# Patient Record
Sex: FEMALE | Race: WHITE | NOT HISPANIC OR LATINO | Employment: OTHER | URBAN - METROPOLITAN AREA
[De-identification: names, ages, dates, MRNs, and addresses within clinical notes are randomized per-mention and may not be internally consistent; named-entity substitution may affect disease eponyms.]

---

## 2017-03-11 ENCOUNTER — APPOINTMENT (OUTPATIENT)
Dept: LAB | Facility: CLINIC | Age: 57
End: 2017-03-11
Payer: COMMERCIAL

## 2017-03-11 DIAGNOSIS — Z13.0 ENCOUNTER FOR SCREENING FOR DISEASES OF THE BLOOD AND BLOOD-FORMING ORGANS AND CERTAIN DISORDERS INVOLVING THE IMMUNE MECHANISM: ICD-10-CM

## 2017-03-11 DIAGNOSIS — E03.9 HYPOTHYROIDISM: ICD-10-CM

## 2017-03-11 DIAGNOSIS — I10 ESSENTIAL (PRIMARY) HYPERTENSION: ICD-10-CM

## 2017-03-11 DIAGNOSIS — Z13.220 ENCOUNTER FOR SCREENING FOR LIPOID DISORDERS: ICD-10-CM

## 2017-03-11 LAB
ALBUMIN SERPL BCP-MCNC: 3.9 G/DL (ref 3.5–5)
ALP SERPL-CCNC: 57 U/L (ref 46–116)
ALT SERPL W P-5'-P-CCNC: 28 U/L (ref 12–78)
ANION GAP SERPL CALCULATED.3IONS-SCNC: 5 MMOL/L (ref 4–13)
AST SERPL W P-5'-P-CCNC: 19 U/L (ref 5–45)
BASOPHILS # BLD AUTO: 0.01 THOUSANDS/ΜL (ref 0–0.1)
BASOPHILS NFR BLD AUTO: 0 % (ref 0–1)
BILIRUB SERPL-MCNC: 0.59 MG/DL (ref 0.2–1)
BUN SERPL-MCNC: 8 MG/DL (ref 5–25)
CALCIUM SERPL-MCNC: 9.1 MG/DL (ref 8.3–10.1)
CHLORIDE SERPL-SCNC: 105 MMOL/L (ref 100–108)
CHOLEST SERPL-MCNC: 194 MG/DL (ref 50–200)
CO2 SERPL-SCNC: 30 MMOL/L (ref 21–32)
CREAT SERPL-MCNC: 0.7 MG/DL (ref 0.6–1.3)
EOSINOPHIL # BLD AUTO: 0.07 THOUSAND/ΜL (ref 0–0.61)
EOSINOPHIL NFR BLD AUTO: 1 % (ref 0–6)
ERYTHROCYTE [DISTWIDTH] IN BLOOD BY AUTOMATED COUNT: 13.3 % (ref 11.6–15.1)
GFR SERPL CREATININE-BSD FRML MDRD: >60 ML/MIN/1.73SQ M
GLUCOSE SERPL-MCNC: 96 MG/DL (ref 65–140)
HCT VFR BLD AUTO: 46.4 % (ref 34.8–46.1)
HDLC SERPL-MCNC: 61 MG/DL (ref 40–60)
HGB BLD-MCNC: 15.8 G/DL (ref 11.5–15.4)
LDLC SERPL CALC-MCNC: 103 MG/DL (ref 0–100)
LYMPHOCYTES # BLD AUTO: 2.44 THOUSANDS/ΜL (ref 0.6–4.47)
LYMPHOCYTES NFR BLD AUTO: 35 % (ref 14–44)
MCH RBC QN AUTO: 31.7 PG (ref 26.8–34.3)
MCHC RBC AUTO-ENTMCNC: 34.1 G/DL (ref 31.4–37.4)
MCV RBC AUTO: 93 FL (ref 82–98)
MONOCYTES # BLD AUTO: 0.61 THOUSAND/ΜL (ref 0.17–1.22)
MONOCYTES NFR BLD AUTO: 9 % (ref 4–12)
NEUTROPHILS # BLD AUTO: 3.88 THOUSANDS/ΜL (ref 1.85–7.62)
NEUTS SEG NFR BLD AUTO: 55 % (ref 43–75)
NRBC BLD AUTO-RTO: 0 /100 WBCS
PLATELET # BLD AUTO: 235 THOUSANDS/UL (ref 149–390)
PMV BLD AUTO: 10.6 FL (ref 8.9–12.7)
POTASSIUM SERPL-SCNC: 3.9 MMOL/L (ref 3.5–5.3)
PROT SERPL-MCNC: 7.8 G/DL (ref 6.4–8.2)
RBC # BLD AUTO: 4.98 MILLION/UL (ref 3.81–5.12)
SODIUM SERPL-SCNC: 140 MMOL/L (ref 136–145)
TRIGL SERPL-MCNC: 152 MG/DL
TSH SERPL DL<=0.05 MIU/L-ACNC: 6.79 UIU/ML (ref 0.36–3.74)
WBC # BLD AUTO: 7.02 THOUSAND/UL (ref 4.31–10.16)

## 2017-03-11 PROCEDURE — 85025 COMPLETE CBC W/AUTO DIFF WBC: CPT

## 2017-03-11 PROCEDURE — 84443 ASSAY THYROID STIM HORMONE: CPT

## 2017-03-11 PROCEDURE — 80053 COMPREHEN METABOLIC PANEL: CPT

## 2017-03-11 PROCEDURE — 80061 LIPID PANEL: CPT

## 2017-03-11 PROCEDURE — 36415 COLL VENOUS BLD VENIPUNCTURE: CPT

## 2017-04-24 ENCOUNTER — ALLSCRIPTS OFFICE VISIT (OUTPATIENT)
Dept: OTHER | Facility: OTHER | Age: 57
End: 2017-04-24

## 2017-04-24 ENCOUNTER — HOSPITAL ENCOUNTER (OUTPATIENT)
Dept: RADIOLOGY | Facility: CLINIC | Age: 57
Discharge: HOME | End: 2017-04-24
Payer: COMMERCIAL

## 2017-04-24 ENCOUNTER — APPOINTMENT (OUTPATIENT)
Dept: LAB | Facility: CLINIC | Age: 57
End: 2017-04-24
Payer: COMMERCIAL

## 2017-04-24 DIAGNOSIS — I72.9 ANEURYSM (HCC): ICD-10-CM

## 2017-04-24 DIAGNOSIS — R94.31 ABNORMAL ELECTROCARDIOGRAM: ICD-10-CM

## 2017-04-24 DIAGNOSIS — E03.9 HYPOTHYROIDISM: ICD-10-CM

## 2017-04-24 DIAGNOSIS — M25.512 PAIN IN LEFT SHOULDER: ICD-10-CM

## 2017-04-24 DIAGNOSIS — R07.9 CHEST PAIN: ICD-10-CM

## 2017-04-24 PROCEDURE — 73030 X-RAY EXAM OF SHOULDER: CPT

## 2017-04-24 PROCEDURE — 72050 X-RAY EXAM NECK SPINE 4/5VWS: CPT

## 2017-04-24 PROCEDURE — 84443 ASSAY THYROID STIM HORMONE: CPT

## 2017-04-25 LAB — TSH SERPL DL<=0.05 MIU/L-ACNC: 0.03 UIU/ML (ref 0.36–3.74)

## 2017-05-19 ENCOUNTER — ALLSCRIPTS OFFICE VISIT (OUTPATIENT)
Dept: OTHER | Facility: OTHER | Age: 57
End: 2017-05-19

## 2017-05-22 ENCOUNTER — TRANSCRIBE ORDERS (OUTPATIENT)
Dept: ADMINISTRATIVE | Facility: HOSPITAL | Age: 57
End: 2017-05-22

## 2017-05-22 DIAGNOSIS — R07.9 CHEST PAIN, UNSPECIFIED: ICD-10-CM

## 2017-05-22 DIAGNOSIS — R94.31 NONSPECIFIC ABNORMAL ELECTROCARDIOGRAM (ECG) (EKG): Primary | ICD-10-CM

## 2017-06-09 ENCOUNTER — HOSPITAL ENCOUNTER (OUTPATIENT)
Dept: NON INVASIVE DIAGNOSTICS | Facility: CLINIC | Age: 57
Discharge: HOME | End: 2017-06-09
Attending: INTERNAL MEDICINE
Payer: COMMERCIAL

## 2017-06-09 DIAGNOSIS — R94.31 NONSPECIFIC ABNORMAL ELECTROCARDIOGRAM (ECG) (EKG): ICD-10-CM

## 2017-06-09 DIAGNOSIS — R07.9 CHEST PAIN, UNSPECIFIED: ICD-10-CM

## 2017-06-09 LAB
CHEST PAIN STATEMENT: NORMAL
MAX DIASTOLIC BP: 80 MMHG
MAX HEART RATE: 121 BPM
MAX PREDICTED HEART RATE: 163 BPM
MAX. SYSTOLIC BP: 166 MMHG
PROTOCOL NAME: NORMAL
TARGET HR FORMULA: NORMAL
TEST INDICATION: NORMAL
TIME IN EXERCISE PHASE: 540 S

## 2017-06-09 PROCEDURE — 93306 TTE W/DOPPLER COMPLETE: CPT

## 2017-06-09 PROCEDURE — A9502 TC99M TETROFOSMIN: HCPCS

## 2017-06-09 PROCEDURE — 78452 HT MUSCLE IMAGE SPECT MULT: CPT

## 2017-06-09 PROCEDURE — 93017 CV STRESS TEST TRACING ONLY: CPT

## 2017-06-12 ENCOUNTER — HOSPITAL ENCOUNTER (OUTPATIENT)
Dept: NON INVASIVE DIAGNOSTICS | Facility: CLINIC | Age: 57
Discharge: HOME | End: 2017-06-12
Payer: COMMERCIAL

## 2017-06-12 DIAGNOSIS — I72.9 ANEURYSM (HCC): ICD-10-CM

## 2017-06-12 PROCEDURE — 93978 VASCULAR STUDY: CPT

## 2017-06-21 ENCOUNTER — ALLSCRIPTS OFFICE VISIT (OUTPATIENT)
Dept: OTHER | Facility: OTHER | Age: 57
End: 2017-06-21

## 2017-06-30 ENCOUNTER — GENERIC CONVERSION - ENCOUNTER (OUTPATIENT)
Dept: OTHER | Facility: OTHER | Age: 57
End: 2017-06-30

## 2017-07-19 ENCOUNTER — GENERIC CONVERSION - ENCOUNTER (OUTPATIENT)
Dept: OTHER | Facility: OTHER | Age: 57
End: 2017-07-19

## 2017-11-17 ENCOUNTER — ALLSCRIPTS OFFICE VISIT (OUTPATIENT)
Dept: OTHER | Facility: OTHER | Age: 57
End: 2017-11-17

## 2017-11-17 DIAGNOSIS — E78.2 MIXED HYPERLIPIDEMIA: ICD-10-CM

## 2017-11-19 NOTE — PROGRESS NOTES
Assessment  Assessed    1  Chest pain (786 50) (R07 9)   2  Cigarette nicotine dependence without complication (969 3) (U53 896)   3  Combined hyperlipidemia (272 2) (E78 2)   4  PAD (peripheral artery disease) (443 9) (I73 9)   5  Chronic fatigue (780 79) (R53 82)    Plan  Combined hyperlipidemia    · (1) LIPID PANEL FASTING W DIRECT LDL REFLEX; Status:Active; Requestedfor:17Nov2017;    Perform:[facility]; Due:17Nov2018; Ordered; For:Combined hyperlipidemia; Ordered By:Ronnie Boland;   · (1) TSH; Status:Active; Requested for:17Nov2017;    Perform:[facility]; [id number]; Ordered;hyperlipidemia; Ordered By:Ronnie Boland;   · Follow-up visit in 6 months Evaluation and Treatment  Follow-up  Status: Hold For -Scheduling  Requested for: [id number]   Ordered; For: Combined hyperlipidemia; Ordered By: Marybeth Robertson Performed:  Due: 07FOB0099  PAD (peripheral artery disease)    · Atorvastatin Calcium 20 MG Oral Tablet; Take 1 tablet daily   Rx By: Marybeth Robertson; Dispense: 0 Days ; #:30 Tablet; Refill: 11;PAD (peripheral artery disease); SHARRON = N; Verified Transmission to [facility]Y #[id number] Last Updated By: System, Eland; 11/17/2017 4:44:28 PM    Discussion/Summary  Cardiology Discussion Summary Free Text Note Form [facility]:   #1  Hyperlipidemia, LDL goal is less than 70  Increase dose of Lipitor to 20 mg and check lipid profile in 6 weeks  Peripheral artery disease, asymptomatic  On aspirin and statins  Recommended smoking cessation  Chest pain and fatigue: fatigue could be due to hypothyroidism, I'll recheck her TSH  If the patient continues to have recurrent chest pain, I may have to consider doing cardiac catheterization  follow-up with me in 6 months     Counseling Documentation With Imm: The patient was counseled regarding instructions for management,-- risks and benefits of treatment options,-- importance of compliance with treatment        Chief Complaint  Chief Complaint Free Text Note Form: Follow-up of Chest pain, fatigue and peripheral artery disease  History of Present Illness  Cardiology [facility] Free Text Note Form [facility]: 59-year-old female patient with past medical history of hyperlipidemia, peripheral artery disease, hypothyroidism, smoking is here for follow-up peripheral artery disease, fatigue and chest pain  Patient continues to have gas-like pain which is unchanged since last visit  It is not exertional  She also complains of fatigue which is unchanged from last visit  Her last TSH was back in April 2017 which was low  She denies having any claudication of right lower extremity  UNFORTUNATELY PATIENT CONTINUES TO SMOKE  profile earlier this year showed LDL > 100 and triglyceride >150  showed right iliac stenosis 50-75%  No claudication  stress test, no ischemia  Review of Systems  Cardiology Female ROS:    Cardiac: chest pain, but-- as noted in HPI--   chest tightness with eating  Skin: No complaints of nonhealing sores or skin rash  Genitourinary: No complaints of recurrent urinary tract infections, frequent urination at night, difficult urination, blood in urine, kidney stones, loss of bladder control, kidney problems, denies any birth control or hormone replacement, is not post menopausal, not currently pregnant  Psychological: No complaints of feeling depressed, anxiety, panic attacks, or difficulty concentrating  General: No complaints of trouble sleeping, lack of energy, fatigue, appetite changes, weight changes, fever, frequent infections, or night sweats  Respiratory: No complaints of shortness of breath, cough with sputum, or wheezing  HEENT: No complaints of serious problems, hearing problems, nose problems, throat problems, or snoring  Gastrointestinal: No complaints of liver problems, nausea, vomiting, heartburn, constipation, bloody stools, diarrhea, problems swallowing, adbominal pain, or rectal bleeding    Hematologic: No complaints of bleeding disorders, anemia, blood clots, or excessive brusing  Neurological: No complaints of numbness, tingling, dizziness, weakness, seizures, headaches, syncope or fainting, AM fatigue, daytime sleepiness, no witnessed apnea episodes  Musculoskeletal: No complaints of arthritis, back pain, or painfull swelling  ROS Reviewed:   ROS reviewed  Active Problems  Problems    1  Abnormal EKG (794 31) (R94 31)   2  ADHD (attention deficit hyperactivity disorder) (314 01) (F90 9)   3  Aneurysm (442 9) (I72 9)   4  Bilateral carpal tunnel syndrome (354 0) (G56 03)   5  Chest pain (786 50) (R07 9)   6  Cigarette nicotine dependence without complication (075 2) (Y37 590)   7  Depression (311) (F32 9)   8  Gastroesophageal reflux disease (530 81) (K21 9)   9  H/O cardiac murmur (V12 59) (Z86 79)   10  Heart aneurysm (414 10) (I25 3)   11  Hypertension (401 9) (I10)   12  Hypothyroidism (244 9) (E03 9)   13  Left shoulder pain (719 41) (M25 512)   14  Neck pain (723 1) (M54 2)   15  Onychomycosis (110 1) (B35 1)   16  PAD (peripheral artery disease) (443 9) (I73 9)   17  Rotator cuff tear arthropathy of right shoulder (716 81) (M12 811)   18  Screening for deficiency anemia (V78 1) (Z13 0)   19  Screening for lipoid disorders (V77 91) (Z13 220)   20  Tobacco use (305 1) (Z72 0)    Past Medical History  Problems    1  History of Denial (799 29) (R45 89)   2  History of Embedded wood splinter (V90 33) (Z18 33)   3  History of acute bronchitis (V12 69) (Z87 09)   4  History of acute sinusitis (V12 69) (Z87 09)   5  History of Yeast infection (112 9) (B37 9)  Active Problems And Past Medical History Reviewed: The active problems and past medical history were reviewed and updated today  Surgical History  Problems    1  History of  Section   2  Denied: History of Recent Surgery  Surgical History Reviewed: The surgical history was reviewed and updated today  Family History  Mother    1   Family history of Denial  Father 2  Family history of CAD (coronary artery disease)   3  Family history of Denial  Family History Reviewed: The family history was reviewed and updated today  Social History  Problems    · Current every day smoker (305 1) (F17 200)   · Tobacco use (305 1) (Z72 0)  Social History Reviewed: The social history was reviewed and updated today  The social history was reviewed and is unchanged  Current Meds   1  Aspirin 81 MG Oral Tablet Delayed Release; TAKE 1 TABLET DAILY; Therapy: 21Jun2017 to (Evaluate:16Jun2018)  Requested for: 21Jun2017; Last [id number] Ordered   2  Atorvastatin Calcium 10 MG Oral Tablet; TAKE 1 TABLET AT BEDTIME; Therapy: 21Jun2017 to (Evaluate:16Jun2018)  Requested for: 21Jun2017; Last [id number] Ordered   3  Levothyroxine Sodium 175 MCG Oral Tablet; Take 1 tablet daily; Therapy: 37LDX1675 to (Last Rx:13Nov2017)  Requested for: 54MAL9479 Ordered   4  Lisinopril 5 MG Oral Tablet; TAKE 1 TABLET DAILY; Therapy: 87Bdk4999 to (Erin Root)  Requested for: 37FOK9108; Last Rx:[id number] Ordered  Medication List Reviewed: The medication list was reviewed and updated today  Allergies  Medication    1  Penicillins    Vitals  Vital Signs    Recorded: 32BZF8824 04:19PM   Heart Rate 82   Systolic 349   Diastolic 84   Height 5 ft 5 in   Weight 166 lb    BMI Calculated 27 62   BSA Calculated 1 83   O2 Saturation 97       Physical Exam   Constitutional  General appearance: No acute distress, well appearing and well nourished  Eyes  Conjunctiva and Sclera examination: Conjunctiva pink, sclera anicteric  Ears, Nose, Mouth, and Throat - Oropharynx: Clear, nares are clear, mucous membranes are moist   Neck  Neck and thyroid: Normal, supple, trachea midline, no thyromegaly  Pulmonary  Respiratory effort: No increased work of breathing or signs of respiratory distress  Auscultation of lungs: Clear to auscultation, no rales, no rhonchi, no wheezing, good air movement     Cardiovascular Auscultation of heart: Normal rate and rhythm, normal S1 and S2, no murmurs  Carotid pulses: Normal, 2+ bilaterally  Peripheral vascular exam: Normal pulses throughout, no tenderness, erythema or swelling  Pedal pulses: Normal, 2+ bilaterally  Examination of extremities for edema and/or varicosities: Normal    Abdomen  Abdomen: Non-tender and no distention  Liver and spleen: No hepatomegaly or splenomegaly  Musculoskeletal Gait and station: Normal gait  -- Digits and nails: Normal without clubbing or cyanosis  -- Inspection/palpation of joints, bones, and muscles: Normal, ROM normal    Skin - Skin and subcutaneous tissue: Normal without rashes or lesions  Skin is warm and well perfused, normal turgor  Neurologic - Cranial nerves: II - XII intact  -- Speech: Normal    Psychiatric - Orientation to person, place, and time: Normal -- Mood and affect: Normal       Results/Data  ECG Report:  Rhythm and rate:  normal sinus rhythm   nsr rate 73, anteroseptal infarct, ST - T changes inferior c/w ischemia      Signatures   Electronically signed by : REJI Coronado ; Nov 17 2017  4:56PM EST                       (Author)

## 2018-01-12 VITALS
HEART RATE: 77 BPM | HEIGHT: 65 IN | DIASTOLIC BLOOD PRESSURE: 86 MMHG | SYSTOLIC BLOOD PRESSURE: 144 MMHG | OXYGEN SATURATION: 98 % | WEIGHT: 160 LBS | BODY MASS INDEX: 26.66 KG/M2

## 2018-01-12 VITALS
DIASTOLIC BLOOD PRESSURE: 78 MMHG | OXYGEN SATURATION: 98 % | BODY MASS INDEX: 26.05 KG/M2 | HEART RATE: 80 BPM | SYSTOLIC BLOOD PRESSURE: 134 MMHG | HEIGHT: 65 IN | WEIGHT: 156.38 LBS

## 2018-01-14 VITALS
WEIGHT: 166 LBS | OXYGEN SATURATION: 97 % | BODY MASS INDEX: 27.66 KG/M2 | DIASTOLIC BLOOD PRESSURE: 84 MMHG | HEART RATE: 82 BPM | SYSTOLIC BLOOD PRESSURE: 138 MMHG | HEIGHT: 65 IN

## 2018-01-15 VITALS — HEART RATE: 72 BPM | OXYGEN SATURATION: 97 % | SYSTOLIC BLOOD PRESSURE: 128 MMHG | DIASTOLIC BLOOD PRESSURE: 76 MMHG

## 2018-01-22 VITALS
WEIGHT: 159 LBS | SYSTOLIC BLOOD PRESSURE: 119 MMHG | DIASTOLIC BLOOD PRESSURE: 74 MMHG | HEIGHT: 65 IN | BODY MASS INDEX: 26.49 KG/M2

## 2018-03-01 ENCOUNTER — APPOINTMENT (OUTPATIENT)
Dept: LAB | Facility: CLINIC | Age: 58
End: 2018-03-01
Payer: COMMERCIAL

## 2018-03-01 DIAGNOSIS — E78.2 MIXED HYPERLIPIDEMIA: ICD-10-CM

## 2018-03-01 LAB
CHOLEST SERPL-MCNC: 108 MG/DL (ref 50–200)
HDLC SERPL-MCNC: 46 MG/DL (ref 40–60)
LDLC SERPL CALC-MCNC: 38 MG/DL (ref 0–100)
TRIGL SERPL-MCNC: 119 MG/DL
TSH SERPL DL<=0.05 MIU/L-ACNC: 0.68 UIU/ML (ref 0.36–3.74)

## 2018-03-01 PROCEDURE — 36415 COLL VENOUS BLD VENIPUNCTURE: CPT

## 2018-03-01 PROCEDURE — 80061 LIPID PANEL: CPT

## 2018-03-01 PROCEDURE — 84443 ASSAY THYROID STIM HORMONE: CPT

## 2018-03-20 DIAGNOSIS — I10 ESSENTIAL HYPERTENSION: Primary | ICD-10-CM

## 2018-03-21 RX ORDER — LISINOPRIL 5 MG/1
TABLET ORAL
Qty: 90 TABLET | Refills: 3 | Status: SHIPPED | OUTPATIENT
Start: 2018-03-21 | End: 2018-11-16 | Stop reason: SDUPTHER

## 2018-03-30 ENCOUNTER — APPOINTMENT (OUTPATIENT)
Dept: LAB | Facility: CLINIC | Age: 58
End: 2018-03-30
Payer: COMMERCIAL

## 2018-03-30 ENCOUNTER — OFFICE VISIT (OUTPATIENT)
Dept: INTERNAL MEDICINE CLINIC | Facility: CLINIC | Age: 58
End: 2018-03-30
Payer: COMMERCIAL

## 2018-03-30 VITALS
SYSTOLIC BLOOD PRESSURE: 138 MMHG | DIASTOLIC BLOOD PRESSURE: 80 MMHG | TEMPERATURE: 98.6 F | OXYGEN SATURATION: 95 % | HEART RATE: 72 BPM | BODY MASS INDEX: 28.59 KG/M2 | HEIGHT: 65 IN | WEIGHT: 171.6 LBS

## 2018-03-30 DIAGNOSIS — E78.2 COMBINED HYPERLIPIDEMIA: ICD-10-CM

## 2018-03-30 DIAGNOSIS — K21.9 GASTROESOPHAGEAL REFLUX DISEASE WITHOUT ESOPHAGITIS: ICD-10-CM

## 2018-03-30 DIAGNOSIS — I10 ESSENTIAL HYPERTENSION: ICD-10-CM

## 2018-03-30 DIAGNOSIS — N39.0 URINARY TRACT INFECTION WITHOUT HEMATURIA, SITE UNSPECIFIED: Primary | ICD-10-CM

## 2018-03-30 LAB
ALBUMIN SERPL BCP-MCNC: 3.8 G/DL (ref 3.5–5)
ALP SERPL-CCNC: 69 U/L (ref 46–116)
ALT SERPL W P-5'-P-CCNC: 42 U/L (ref 12–78)
ANION GAP SERPL CALCULATED.3IONS-SCNC: 2 MMOL/L (ref 4–13)
AST SERPL W P-5'-P-CCNC: 34 U/L (ref 5–45)
BASOPHILS # BLD AUTO: 0.01 THOUSANDS/ΜL (ref 0–0.1)
BASOPHILS NFR BLD AUTO: 0 % (ref 0–1)
BILIRUB SERPL-MCNC: 0.91 MG/DL (ref 0.2–1)
BILIRUB UR QL STRIP: ABNORMAL
BUN SERPL-MCNC: 14 MG/DL (ref 5–25)
CALCIUM SERPL-MCNC: 8.8 MG/DL (ref 8.3–10.1)
CHLORIDE SERPL-SCNC: 108 MMOL/L (ref 100–108)
CHOLEST SERPL-MCNC: 97 MG/DL (ref 50–200)
CLARITY UR: ABNORMAL
CO2 SERPL-SCNC: 31 MMOL/L (ref 21–32)
COLOR UR: ABNORMAL
CREAT SERPL-MCNC: 0.64 MG/DL (ref 0.6–1.3)
EOSINOPHIL # BLD AUTO: 0.09 THOUSAND/ΜL (ref 0–0.61)
EOSINOPHIL NFR BLD AUTO: 1 % (ref 0–6)
ERYTHROCYTE [DISTWIDTH] IN BLOOD BY AUTOMATED COUNT: 13.3 % (ref 11.6–15.1)
EST. AVERAGE GLUCOSE BLD GHB EST-MCNC: 117 MG/DL
GFR SERPL CREATININE-BSD FRML MDRD: 99 ML/MIN/1.73SQ M
GLUCOSE P FAST SERPL-MCNC: 112 MG/DL (ref 65–99)
GLUCOSE UR STRIP-MCNC: NEGATIVE MG/DL
HBA1C MFR BLD: 5.7 % (ref 4.2–6.3)
HCT VFR BLD AUTO: 42.2 % (ref 34.8–46.1)
HDLC SERPL-MCNC: 53 MG/DL (ref 40–60)
HGB BLD-MCNC: 14.4 G/DL (ref 11.5–15.4)
HGB UR QL STRIP.AUTO: NEGATIVE
KETONES UR STRIP-MCNC: NEGATIVE MG/DL
LDLC SERPL CALC-MCNC: 32 MG/DL (ref 0–100)
LEUKOCYTE ESTERASE UR QL STRIP: NEGATIVE
LYMPHOCYTES # BLD AUTO: 2.61 THOUSANDS/ΜL (ref 0.6–4.47)
LYMPHOCYTES NFR BLD AUTO: 36 % (ref 14–44)
MCH RBC QN AUTO: 30.7 PG (ref 26.8–34.3)
MCHC RBC AUTO-ENTMCNC: 34.1 G/DL (ref 31.4–37.4)
MCV RBC AUTO: 90 FL (ref 82–98)
MONOCYTES # BLD AUTO: 0.55 THOUSAND/ΜL (ref 0.17–1.22)
MONOCYTES NFR BLD AUTO: 8 % (ref 4–12)
NEUTROPHILS # BLD AUTO: 3.94 THOUSANDS/ΜL (ref 1.85–7.62)
NEUTS SEG NFR BLD AUTO: 55 % (ref 43–75)
NITRITE UR QL STRIP: NEGATIVE
NRBC BLD AUTO-RTO: 0 /100 WBCS
PH UR STRIP.AUTO: 6 [PH] (ref 4.5–8)
PLATELET # BLD AUTO: 213 THOUSANDS/UL (ref 149–390)
PMV BLD AUTO: 10.6 FL (ref 8.9–12.7)
POTASSIUM SERPL-SCNC: 4 MMOL/L (ref 3.5–5.3)
PROT SERPL-MCNC: 7.4 G/DL (ref 6.4–8.2)
PROT UR STRIP-MCNC: NEGATIVE MG/DL
RBC # BLD AUTO: 4.69 MILLION/UL (ref 3.81–5.12)
SODIUM SERPL-SCNC: 141 MMOL/L (ref 136–145)
SP GR UR STRIP.AUTO: 1.02 (ref 1–1.03)
TRIGL SERPL-MCNC: 60 MG/DL
UROBILINOGEN UR QL STRIP.AUTO: 2 E.U./DL
WBC # BLD AUTO: 7.21 THOUSAND/UL (ref 4.31–10.16)

## 2018-03-30 PROCEDURE — 36415 COLL VENOUS BLD VENIPUNCTURE: CPT

## 2018-03-30 PROCEDURE — 81003 URINALYSIS AUTO W/O SCOPE: CPT | Performed by: PHYSICIAN ASSISTANT

## 2018-03-30 PROCEDURE — 3079F DIAST BP 80-89 MM HG: CPT | Performed by: PHYSICIAN ASSISTANT

## 2018-03-30 PROCEDURE — 83036 HEMOGLOBIN GLYCOSYLATED A1C: CPT

## 2018-03-30 PROCEDURE — 99214 OFFICE O/P EST MOD 30 MIN: CPT | Performed by: PHYSICIAN ASSISTANT

## 2018-03-30 PROCEDURE — 3075F SYST BP GE 130 - 139MM HG: CPT | Performed by: PHYSICIAN ASSISTANT

## 2018-03-30 PROCEDURE — 80053 COMPREHEN METABOLIC PANEL: CPT

## 2018-03-30 PROCEDURE — 80061 LIPID PANEL: CPT

## 2018-03-30 PROCEDURE — 85025 COMPLETE CBC W/AUTO DIFF WBC: CPT

## 2018-03-30 RX ORDER — DULOXETIN HYDROCHLORIDE 30 MG/1
30 CAPSULE, DELAYED RELEASE ORAL EVERY MORNING
Refills: 3 | COMMUNITY
Start: 2018-03-21 | End: 2018-11-16 | Stop reason: SDUPTHER

## 2018-03-30 RX ORDER — CIPROFLOXACIN 250 MG/1
250 TABLET, FILM COATED ORAL EVERY 12 HOURS SCHEDULED
Qty: 10 TABLET | Refills: 0 | Status: SHIPPED | OUTPATIENT
Start: 2018-03-30 | End: 2018-04-04

## 2018-03-30 RX ORDER — PANTOPRAZOLE SODIUM 40 MG/1
40 TABLET, DELAYED RELEASE ORAL DAILY
Refills: 11 | COMMUNITY
Start: 2018-03-19 | End: 2018-11-27 | Stop reason: SDUPTHER

## 2018-03-30 RX ORDER — LEVOTHYROXINE SODIUM 175 UG/1
1 TABLET ORAL DAILY
COMMUNITY
Start: 2017-11-13 | End: 2019-03-01 | Stop reason: SDUPTHER

## 2018-03-30 RX ORDER — ATORVASTATIN CALCIUM 20 MG/1
1 TABLET, FILM COATED ORAL DAILY
COMMUNITY
Start: 2017-06-21 | End: 2018-06-15 | Stop reason: SDUPTHER

## 2018-03-30 NOTE — PROGRESS NOTES
Assessment/Plan:  Dysuria for a week will complete her course of Cipro preview checking all of her screening labs continue follow-up       Diagnoses and all orders for this visit:    Urinary tract infection without hematuria, site unspecified  -     UA w Reflex to Microscopic w Reflex to Culture    Essential hypertension  -     ciprofloxacin (CIPRO) 250 mg tablet; Take 1 tablet (250 mg total) by mouth every 12 (twelve) hours for 5 days  -     CBC and differential; Future  -     Comprehensive metabolic panel; Future  -     HEMOGLOBIN A1C W/ EAG ESTIMATION; Future  -     Lipid panel; Future  -     UA w Reflex to Microscopic w Reflex to Culture    Combined hyperlipidemia  -     ciprofloxacin (CIPRO) 250 mg tablet; Take 1 tablet (250 mg total) by mouth every 12 (twelve) hours for 5 days  -     CBC and differential; Future  -     Comprehensive metabolic panel; Future  -     HEMOGLOBIN A1C W/ EAG ESTIMATION; Future  -     Lipid panel; Future  -     UA w Reflex to Microscopic w Reflex to Culture    Gastroesophageal reflux disease without esophagitis  -     ciprofloxacin (CIPRO) 250 mg tablet; Take 1 tablet (250 mg total) by mouth every 12 (twelve) hours for 5 days  -     CBC and differential; Future  -     Comprehensive metabolic panel; Future  -     HEMOGLOBIN A1C W/ EAG ESTIMATION; Future  -     Lipid panel; Future  -     UA w Reflex to Microscopic w Reflex to Culture    Other orders  -     levothyroxine 175 mcg tablet; Take 1 tablet by mouth daily  -     atorvastatin (LIPITOR) 20 mg tablet; Take 1 tablet by mouth daily  -     aspirin 81 MG tablet; Take 1 tablet by mouth daily  -     DULoxetine (CYMBALTA) 30 mg delayed release capsule; Take 30 mg by mouth every morning  -     pantoprazole (PROTONIX) 40 mg tablet; Take 40 mg by mouth daily  -     NICOTINE STEP 1 21 MG/24HR TD 24 hr patch; APPLY 1 PATCH(ES) EVERY DAY BY TRANSDERMAL ROUTE  Subjective:      Patient ID: Allyson Rubin is a 62 y o  female  She has had burning with urination and foul-smelling urine for a week  Now she has some suprapubic discomfort no vaginal bleeding or discharge  No fever or flank pain  Last urinary tract infection about a year ago  She had 2 leftover Cipro pills which she took yesterday with slight benefit no obvious blood in the urine  She has been gaining weight in the past 3 months about 10 lb  A recent TSH was normal or appetite is good she feels well otherwise normally active  A cigarette smoker      Urinary Tract Infection    Pertinent negatives include no chills, flank pain, frequency, hematuria, nausea, urgency or vomiting  The following portions of the patient's history were reviewed and updated as appropriate:   She has no past medical history on file  ,   does not have any pertinent problems on file  ,   has no past surgical history on file  ,  family history is not on file  ,   reports that she has been smoking  She has never used smokeless tobacco  She reports that she does not drink alcohol or use drugs  ,  is allergic to penicillins     Current Outpatient Prescriptions   Medication Sig Dispense Refill    aspirin 81 MG tablet Take 1 tablet by mouth daily      atorvastatin (LIPITOR) 20 mg tablet Take 1 tablet by mouth daily      DULoxetine (CYMBALTA) 30 mg delayed release capsule Take 30 mg by mouth every morning  3    levothyroxine 175 mcg tablet Take 1 tablet by mouth daily      lisinopril (ZESTRIL) 5 mg tablet take 1 tablet by mouth once daily 90 tablet 3    NICOTINE STEP 1 21 MG/24HR TD 24 hr patch APPLY 1 PATCH(ES) EVERY DAY BY TRANSDERMAL ROUTE   2    pantoprazole (PROTONIX) 40 mg tablet Take 40 mg by mouth daily  11    ciprofloxacin (CIPRO) 250 mg tablet Take 1 tablet (250 mg total) by mouth every 12 (twelve) hours for 5 days 10 tablet 0     No current facility-administered medications for this visit  Review of Systems   Constitutional: Positive for unexpected weight change  Negative for activity change, appetite change, chills, diaphoresis, fatigue and fever  HENT: Negative for congestion, dental problem, drooling, ear discharge, ear pain, facial swelling, hearing loss, nosebleeds, postnasal drip, rhinorrhea, sinus pain, sinus pressure, sneezing, sore throat, tinnitus, trouble swallowing and voice change  Eyes: Negative for photophobia, pain, discharge, redness, itching and visual disturbance  Respiratory: Negative for apnea, cough, choking, chest tightness, shortness of breath, wheezing and stridor  Cardiovascular: Negative for chest pain, palpitations and leg swelling  Gastrointestinal: Negative for abdominal distention, abdominal pain, anal bleeding, blood in stool, constipation, diarrhea, nausea, rectal pain and vomiting  Endocrine: Negative for cold intolerance, heat intolerance, polydipsia, polyphagia and polyuria  Genitourinary: Positive for dysuria  Negative for decreased urine volume, difficulty urinating, enuresis, flank pain, frequency, genital sores, hematuria and urgency  Musculoskeletal: Negative for arthralgias, back pain, gait problem, joint swelling, myalgias, neck pain and neck stiffness  Skin: Negative for color change, pallor, rash and wound  Neurological: Negative for dizziness, tremors, seizures, syncope, facial asymmetry, speech difficulty, weakness, light-headedness, numbness and headaches  Hematological: Negative for adenopathy  Does not bruise/bleed easily  Psychiatric/Behavioral: Negative for agitation, behavioral problems, confusion, decreased concentration, dysphoric mood, hallucinations, self-injury, sleep disturbance and suicidal ideas  The patient is not nervous/anxious and is not hyperactive            Objective:  Vitals:    03/30/18 1044   BP: 138/80   BP Location: Left arm   Patient Position: Sitting   Pulse: 72   Temp: 98 6 °F (37 °C)   SpO2: 95%   Weight: 77 8 kg (171 lb 9 6 oz)   Height: 5' 5" (1 651 m)     Body mass index is 28 56 kg/m²  Physical Exam   Constitutional: She is oriented to person, place, and time  She appears well-developed  HENT:   Head: Normocephalic  Right Ear: External ear normal    Left Ear: External ear normal    Mouth/Throat: Oropharynx is clear and moist    Eyes: Conjunctivae are normal  Pupils are equal, round, and reactive to light  Neck: Neck supple  No thyromegaly present  Cardiovascular: Normal rate, regular rhythm, normal heart sounds and intact distal pulses  Pulmonary/Chest: Effort normal and breath sounds normal    Abdominal: Soft  Bowel sounds are normal  She exhibits no distension and no mass  There is tenderness (SomeSuprapubic tenderness)  There is no rebound and no guarding  Musculoskeletal: Normal range of motion  Neurological: She is alert and oriented to person, place, and time  She has normal reflexes  Skin: Skin is warm and dry

## 2018-06-14 RX ORDER — SALICYLIC ACID 40 %
81 ADHESIVE PATCH, MEDICATED TOPICAL DAILY
Refills: 11 | COMMUNITY
Start: 2018-04-17 | End: 2018-06-26 | Stop reason: SDUPTHER

## 2018-06-14 RX ORDER — BUPRENORPHINE HYDROCHLORIDE, NALOXONE HYDROCHLORIDE 8; 2 MG/1; MG/1
FILM, SOLUBLE BUCCAL; SUBLINGUAL
Refills: 0 | COMMUNITY
Start: 2018-04-26

## 2018-06-15 DIAGNOSIS — E78.5 HYPERLIPIDEMIA, UNSPECIFIED HYPERLIPIDEMIA TYPE: Primary | ICD-10-CM

## 2018-06-15 RX ORDER — ATORVASTATIN CALCIUM 20 MG/1
20 TABLET, FILM COATED ORAL DAILY
Qty: 30 TABLET | Refills: 5 | Status: SHIPPED | OUTPATIENT
Start: 2018-06-15 | End: 2019-12-13 | Stop reason: SDUPTHER

## 2018-06-26 DIAGNOSIS — I73.9 PAD (PERIPHERAL ARTERY DISEASE) (HCC): Primary | ICD-10-CM

## 2018-06-26 RX ORDER — SALICYLIC ACID 40 %
ADHESIVE PATCH, MEDICATED TOPICAL
Qty: 30 TABLET | Refills: 8 | Status: SHIPPED | OUTPATIENT
Start: 2018-06-26 | End: 2018-07-13 | Stop reason: CLARIF

## 2018-07-13 ENCOUNTER — OFFICE VISIT (OUTPATIENT)
Dept: INTERNAL MEDICINE CLINIC | Facility: CLINIC | Age: 58
End: 2018-07-13
Payer: COMMERCIAL

## 2018-07-13 VITALS
BODY MASS INDEX: 27.39 KG/M2 | SYSTOLIC BLOOD PRESSURE: 128 MMHG | HEART RATE: 83 BPM | HEIGHT: 65 IN | OXYGEN SATURATION: 96 % | WEIGHT: 164.4 LBS | DIASTOLIC BLOOD PRESSURE: 84 MMHG

## 2018-07-13 DIAGNOSIS — L23.7 POISON IVY DERMATITIS: Primary | ICD-10-CM

## 2018-07-13 PROCEDURE — 99214 OFFICE O/P EST MOD 30 MIN: CPT | Performed by: PHYSICIAN ASSISTANT

## 2018-07-13 PROCEDURE — 3008F BODY MASS INDEX DOCD: CPT | Performed by: PHYSICIAN ASSISTANT

## 2018-07-13 NOTE — PROGRESS NOTES
Assessment/Plan     black spot poison ivy -treatment is the same as regular poison ivy  Patient is instructed to wash all items that she was wearing that day in hot soapy water with bleach if possible  Wash all linens and towels as well  Wash skin with a washcloth with friction and scribe under the fingernails to prevent continued outbreaks  Apply hydrocortisone cream, cool compresses can also help  Also calamine lotion  Prednisone is offered but the patient declines  If she changes her mind she will call the office  No problem-specific Assessment & Plan notes found for this encounter  There are no diagnoses linked to this encounter  Subjective:      Patient ID: Pete  is a 62 y o  female  Patient comes in with poison ivy  It is all over her arms and legs  She says it is going up her legs now  About 5 days ago over the weekend she was working outside  In addition to the  Usual poison ivy rash, patient has black spots  Scattered about  She says she looked it up and it is a form of poison ivy called black spot poison ivy  It is called this for both the color of the plant which has a black or dark brown coloring as well as the spots that form on the skin  After researching it, it is noted that these black spots / lesions will fall off without scarring  The treatment for this poison ivy is the same as all poison ivy  Patient complains of terrible itching  At the moment she does not have anything on her face, but she does feel like a spot on the inside of her right eye feels itchy  She does not see any rash there but it just feels itchy  The following portions of the patient's history were reviewed and updated as appropriate: allergies, current medications, past family history, past medical history, past social history, past surgical history and problem list     Review of Systems   Constitutional: Negative for chills and fever     HENT: Negative for trouble swallowing  Respiratory: Negative for cough, shortness of breath and wheezing  Cardiovascular: Negative for chest pain  Gastrointestinal: Negative for abdominal pain  Skin: Positive for rash  Objective:      /84 (BP Location: Left arm, Patient Position: Sitting, Cuff Size: Standard)   Pulse 83   Ht 5' 5" (1 651 m)   Wt 74 6 kg (164 lb 6 4 oz)   SpO2 96%   BMI 27 36 kg/m²          Physical Exam   Constitutional: She appears well-developed and well-nourished  HENT:   Head: Normocephalic and atraumatic  Cardiovascular: Normal rate, regular rhythm and normal heart sounds  Pulmonary/Chest: Effort normal and breath sounds normal    Abdominal: Soft  Bowel sounds are normal    Skin: Lesion noted  Scattered about the arms and legs are patches of vesicular lesions    There are several "black" spots

## 2018-11-16 ENCOUNTER — OFFICE VISIT (OUTPATIENT)
Dept: INTERNAL MEDICINE CLINIC | Facility: CLINIC | Age: 58
End: 2018-11-16
Payer: COMMERCIAL

## 2018-11-16 VITALS
DIASTOLIC BLOOD PRESSURE: 84 MMHG | HEART RATE: 66 BPM | HEIGHT: 65 IN | BODY MASS INDEX: 27.46 KG/M2 | WEIGHT: 164.8 LBS | OXYGEN SATURATION: 97 % | SYSTOLIC BLOOD PRESSURE: 120 MMHG

## 2018-11-16 DIAGNOSIS — E03.9 ACQUIRED HYPOTHYROIDISM: ICD-10-CM

## 2018-11-16 DIAGNOSIS — Z12.39 SCREENING FOR BREAST CANCER: ICD-10-CM

## 2018-11-16 DIAGNOSIS — Z23 NEED FOR VACCINATION: ICD-10-CM

## 2018-11-16 DIAGNOSIS — I10 ESSENTIAL HYPERTENSION: ICD-10-CM

## 2018-11-16 DIAGNOSIS — E78.2 MIXED HYPERLIPIDEMIA: ICD-10-CM

## 2018-11-16 DIAGNOSIS — F33.41 RECURRENT MAJOR DEPRESSIVE DISORDER, IN PARTIAL REMISSION (HCC): Primary | ICD-10-CM

## 2018-11-16 DIAGNOSIS — K21.9 GASTROESOPHAGEAL REFLUX DISEASE WITHOUT ESOPHAGITIS: ICD-10-CM

## 2018-11-16 PROBLEM — F11.20 OPIOID DEPENDENCE (HCC): Status: ACTIVE | Noted: 2017-07-26

## 2018-11-16 PROCEDURE — 99214 OFFICE O/P EST MOD 30 MIN: CPT | Performed by: INTERNAL MEDICINE

## 2018-11-16 PROCEDURE — 90471 IMMUNIZATION ADMIN: CPT

## 2018-11-16 PROCEDURE — 90682 RIV4 VACC RECOMBINANT DNA IM: CPT

## 2018-11-16 RX ORDER — DULOXETIN HYDROCHLORIDE 60 MG/1
60 CAPSULE, DELAYED RELEASE ORAL DAILY
Qty: 90 CAPSULE | Refills: 1 | Status: SHIPPED | OUTPATIENT
Start: 2018-11-16 | End: 2018-11-16 | Stop reason: SDUPTHER

## 2018-11-16 RX ORDER — BUPROPION HYDROCHLORIDE 150 MG/1
150 TABLET ORAL DAILY
Qty: 90 TABLET | Refills: 1 | Status: SHIPPED | OUTPATIENT
Start: 2018-11-16 | End: 2018-11-16 | Stop reason: SDUPTHER

## 2018-11-16 RX ORDER — LISINOPRIL 5 MG/1
5 TABLET ORAL DAILY
Qty: 90 TABLET | Refills: 1 | Status: SHIPPED | OUTPATIENT
Start: 2018-11-16 | End: 2019-06-18 | Stop reason: SDUPTHER

## 2018-11-16 RX ORDER — BUPROPION HYDROCHLORIDE 150 MG/1
150 TABLET ORAL DAILY
Qty: 90 TABLET | Refills: 0 | Status: SHIPPED | OUTPATIENT
Start: 2018-11-16 | End: 2019-07-11

## 2018-11-16 RX ORDER — DULOXETIN HYDROCHLORIDE 60 MG/1
60 CAPSULE, DELAYED RELEASE ORAL DAILY
Qty: 90 CAPSULE | Refills: 0 | Status: SHIPPED | OUTPATIENT
Start: 2018-11-16 | End: 2019-07-27 | Stop reason: SDUPTHER

## 2018-11-16 RX ORDER — HYDROCHLOROTHIAZIDE 12.5 MG/1
12.5 TABLET ORAL DAILY
Qty: 90 TABLET | Refills: 1 | Status: SHIPPED | OUTPATIENT
Start: 2018-11-16 | End: 2019-06-05 | Stop reason: SDUPTHER

## 2018-11-16 NOTE — PROGRESS NOTES
INTERNAL MEDICINE OFFICE VISIT  [facility]  [facility], [address]  Tel: [phone]      NAME: Tyler Garza  AGE: 62 y o  SEX: female  : 1960   MRN: [id number]    DATE: 2018  TIME: 4:17 PM      Assessment and Plan:  1  Recurrent major depressive disorder, in partial remission ([facility] [address] )  She was told to continue the Cymbalta at 60 mg daily and Wellbutrin was added in addition as her symptoms are not under control  2  Essential hypertension  Continue lisinopril  Hydrochlorothiazide was added as she has a lot of swelling in her feet  - CBC and differential; Future  - Comprehensive metabolic panel; Future    3  Acquired hypothyroidism  Continue levothyroxine  - TSH, 3rd generation; Future    4  Mixed hyperlipidemia  Continue atorvastatin  - Lipid panel; Future    5  Gastroesophageal reflux disease without esophagitis  Continue pantoprazole    6  Screening for breast cancer    - Mammo screening bilateral w 3d & cad; Future      - Counseling Documentation: patient was counseled regarding: instructions for management, risk factor reductions, prognosis, patient and family education, impressions, risks and benefits of treatment options and importance of compliance with treatment  - Medication Side Effects: Adverse side effects of medications were reviewed with the patient/guardian today  Return for follow up visit in 2 months or earlier, if needed  Chief Complaint:  Chief Complaint   Patient presents with    Medication Refill         History of Present Illness:   Depression-patient has been taking Cymbalta at an increased dose now but still her symptoms are not controlled and she is very depressed  Hypertension-blood pressure is stable  She was taking another medication, the name of which she does not know  Now she wants to discontinue that medication and take lisinopril    She also complains of swelling of her ankles  Hypothyroidism-she takes levothyroxine regularly  Hyperlipidemia-she takes atorvastatin regularly  GERD-she takes pantoprazole with relief of symptoms  Active Problem List:  Patient Active Problem List   Diagnosis    Acquired hypothyroidism    Essential hypertension    Recurrent major depressive disorder, in partial remission ([facility] )    Mixed hyperlipidemia    Gastroesophageal reflux disease without esophagitis    Opioid dependence ([facility] )         Past Medical History:  Past Medical History:   Diagnosis Date    Abnormal ECG     Denial     Heart aneurysm     Heart murmur     Hypertension     Hypothyroidism     PAD (peripheral artery disease) ([facility])          Past Surgical History:  Past Surgical History:   Procedure Laterality Date     SECTION           Family History:  Family History   Problem Relation Age of Onset    Coronary artery disease Father     Other Father         Denial    Other Mother         Denial         Social History:  Social History     Social History    Marital status:      Spouse name: N/A    Number of children: N/A    Years of education: N/A     Social History Main Topics    Smoking status: Current Every Day Smoker    Smokeless tobacco: Never Used    Alcohol use No    Drug use: No    Sexual activity: Yes     Partners: Male     Other Topics Concern    None     Social History Narrative    None         Allergies:   Allergies   Allergen Reactions    Penicillins          Medications:    Current Outpatient Prescriptions:     aspirin 81 MG tablet, Take 1 tablet by mouth daily, Disp: , Rfl:     atorvastatin (LIPITOR) 20 mg tablet, Take 1 tablet (20 mg total) by mouth daily, Disp: 30 tablet, Rfl: 5    DULoxetine (CYMBALTA) 60 mg delayed release capsule, Take 1 capsule (60 mg total) by mouth daily, Disp: 90 capsule, Rfl: 1    levothyroxine 175 mcg tablet, Take 1 tablet by mouth daily, Disp: , Rfl:     lisinopril (ZESTRIL) 5 mg tablet, Take 1 tablet (5 mg total) by mouth daily, Disp: 90 tablet, Rfl: 1    pantoprazole (PROTONIX) 40 mg tablet, Take 40 mg by mouth daily, Disp: , Rfl: 11    SUBOXONE 8-2 MG, USE 2 & 1/2 FILMS UNDER TONGUE DAILY, Disp: , Rfl: 0    buPROPion (WELLBUTRIN XL) 150 mg 24 hr tablet, Take 1 tablet (150 mg total) by mouth daily, Disp: 90 tablet, Rfl: 1      The following portions of the patient's history were reviewed and updated as appropriate: past medical history, past surgical history, family history, social history, allergies, current medications and active problem list       Review of Systems:  Constitutional: Denies fever, chills, weight gain, weight loss, fatigue  Eyes: Denies eye redness, eye discharge, double vision, change in visual acuity  ENT: Denies hearing loss, tinnitus, sneezing, nasal congestion, nasal discharge, sore throat   Respiratory: Denies cough, expectoration, hemoptysis, shortness of breath, wheezing  Cardiovascular: Denies chest pain, palpitations, lower extremity swelling, orthopnea, PND  Gastrointestinal: Denies abdominal pain, heartburn, nausea, vomiting, hematemesis, diarrhea, bloody stools  Genito-Urinary: Denies dysuria, frequency, difficulty in micturition, nocturia, incontinence  Musculoskeletal: Denies back pain, joint pain, muscle pain  Neurologic: Denies confusion, lightheadedness, syncope, headache, focal weakness, sensory changes, seizures  Endocrine: Denies polyuria, polydipsia, temperature intolerance  Allergy and Immunology: Denies hives, insect bite sensitivity  Hematological and Lymphatic: Denies bleeding problems, swollen glands   Psychological: has depression  Dermatological: Denies pruritus, rash, skin lesion changes      Vitals:  Vitals:    11/16/18 1540   BP: 120/84   Pulse: 66   SpO2: 97%       Body mass index is 27 42 kg/m²  Weight (last 2 days)     Date/Time   Weight    11/16/18 1540  74 8 (164 8)                Physical Examination:  General: Patient is not in acute distress  Awake, alert, responding to commands  No weight gain or loss  Head: Normocephalic  Atraumatic  Eyes: Conjunctiva and lids with no swelling, erythema or discharge  Both pupils normal sized, round and reactive to light  Sclera nonicteric  ENT: External examination of nose and ear normal  Otoscopic examination shows translucent tympanic membranes with patent canals without erythema  Oropharynx moist with no erythema, edema, exudate or lesions  Neck: Supple  JVP not raised  Trachea midline  No masses  No thyromegaly  Lungs: No signs of increased work of breathing or respiratory distress  Bilateral bronchovascular breath sounds with no crackles or rhonchi  Chest wall: No tenderness  Cardiovascular: Normal PMI  No thrills  Regular rate and rhythm  S1 and S2 normal  No murmur, rub or gallop  Gastrointestinal: Abdomen soft, nontender  No guarding or rigidity  Liver and spleen not palpable  Bowel sounds present  Neurologic: Cranial nerves II-XII intact   Cortical functions normal  Motor system - Reflexes 2+ and symmetrical  Sensations normal  Musculoskeletal: Gait normal  No joint tenderness  Integumentary: Skin normal with no rash or lesions  Lymphatic: No palpable lymph nodes in neck, axilla or groin  Extremities: No clubbing, cyanosis, edema or varicosities  Psychological: Judgement and insight normal  Depressed      Laboratory Results:  CBC with diff:   Lab Results   Component Value Date    WBC 7 21 03/30/2018    RBC 4 69 03/30/2018    HGB 14 4 03/30/2018    HCT 42 2 03/30/2018    MCV 90 03/30/2018    MCH 30 7 03/30/2018    RDW 13 3 03/30/2018     03/30/2018       CMP:  Lab Results   Component Value Date    CREATININE 0 64 03/30/2018    BUN 14 03/30/2018    K 4 0 03/30/2018     03/30/2018    CO2 31 03/30/2018    ALKPHOS 69 03/30/2018    ALT 42 03/30/2018    AST 34 03/30/2018       Lab Results   Component Value Date    HGBA1C 5 7 03/30/2018       No results found for: TROPONINI, CKMB, CKTOTAL    Lipid Profile: No results found for: CHOL  Lab Results   Component Value Date    HDL 53 03/30/2018    HDL 46 03/01/2018     Lab Results   Component Value Date    LDLCALC 32 03/30/2018    LDLCALC 38 03/01/2018     Lab Results   Component Value Date    TRIG 60 03/30/2018    TRIG 119 03/01/2018         Health Maintenance:  Health Maintenance   Topic Date Due    Hepatitis C Screening  1960    Pneumococcal PPSV23 Medium Risk Adult (1 of 1 - PPSV23) 01/14/1979    PAP SMEAR  01/14/1981    MAMMOGRAM  08/18/2016    INFLUENZA VACCINE  03/30/2019 (Originally 7/1/2018)    DTaP,Tdap,and Td Vaccines (2 - Td) 03/16/2020    CRC Screening: Colonoscopy  06/30/2027     Immunization History   Administered Date(s) Administered    Influenza TIV (IM) 1960    Pneumococcal Polysaccharide PPV23 1960    Tdap 1960    Zoster 1960         Dee Chung MD  11/16/2018,4:17 PM

## 2018-11-26 ENCOUNTER — LAB (OUTPATIENT)
Dept: LAB | Facility: CLINIC | Age: 58
End: 2018-11-26
Payer: COMMERCIAL

## 2018-11-26 DIAGNOSIS — I10 ESSENTIAL HYPERTENSION: ICD-10-CM

## 2018-11-26 DIAGNOSIS — E78.2 MIXED HYPERLIPIDEMIA: ICD-10-CM

## 2018-11-26 DIAGNOSIS — E03.9 ACQUIRED HYPOTHYROIDISM: ICD-10-CM

## 2018-11-26 LAB
ALBUMIN SERPL BCP-MCNC: 3.5 G/DL (ref 3.5–5)
ALP SERPL-CCNC: 74 U/L (ref 46–116)
ALT SERPL W P-5'-P-CCNC: 40 U/L (ref 12–78)
ANION GAP SERPL CALCULATED.3IONS-SCNC: 2 MMOL/L (ref 4–13)
AST SERPL W P-5'-P-CCNC: 34 U/L (ref 5–45)
BASOPHILS # BLD AUTO: 0.03 THOUSANDS/ΜL (ref 0–0.1)
BASOPHILS NFR BLD AUTO: 0 % (ref 0–1)
BILIRUB SERPL-MCNC: 0.82 MG/DL (ref 0.2–1)
BUN SERPL-MCNC: 12 MG/DL (ref 5–25)
CALCIUM SERPL-MCNC: 9.1 MG/DL (ref 8.3–10.1)
CHLORIDE SERPL-SCNC: 106 MMOL/L (ref 100–108)
CHOLEST SERPL-MCNC: 112 MG/DL (ref 50–200)
CO2 SERPL-SCNC: 33 MMOL/L (ref 21–32)
CREAT SERPL-MCNC: 0.62 MG/DL (ref 0.6–1.3)
EOSINOPHIL # BLD AUTO: 0.16 THOUSAND/ΜL (ref 0–0.61)
EOSINOPHIL NFR BLD AUTO: 2 % (ref 0–6)
ERYTHROCYTE [DISTWIDTH] IN BLOOD BY AUTOMATED COUNT: 13.1 % (ref 11.6–15.1)
GFR SERPL CREATININE-BSD FRML MDRD: 100 ML/MIN/1.73SQ M
GLUCOSE P FAST SERPL-MCNC: 104 MG/DL (ref 65–99)
HCT VFR BLD AUTO: 46.2 % (ref 34.8–46.1)
HDLC SERPL-MCNC: 49 MG/DL (ref 40–60)
HGB BLD-MCNC: 15.2 G/DL (ref 11.5–15.4)
IMM GRANULOCYTES # BLD AUTO: 0.02 THOUSAND/UL (ref 0–0.2)
IMM GRANULOCYTES NFR BLD AUTO: 0 % (ref 0–2)
LDLC SERPL CALC-MCNC: 42 MG/DL (ref 0–100)
LYMPHOCYTES # BLD AUTO: 2.96 THOUSANDS/ΜL (ref 0.6–4.47)
LYMPHOCYTES NFR BLD AUTO: 33 % (ref 14–44)
MCH RBC QN AUTO: 30.1 PG (ref 26.8–34.3)
MCHC RBC AUTO-ENTMCNC: 32.9 G/DL (ref 31.4–37.4)
MCV RBC AUTO: 92 FL (ref 82–98)
MONOCYTES # BLD AUTO: 0.87 THOUSAND/ΜL (ref 0.17–1.22)
MONOCYTES NFR BLD AUTO: 10 % (ref 4–12)
NEUTROPHILS # BLD AUTO: 4.98 THOUSANDS/ΜL (ref 1.85–7.62)
NEUTS SEG NFR BLD AUTO: 55 % (ref 43–75)
NONHDLC SERPL-MCNC: 63 MG/DL
NRBC BLD AUTO-RTO: 0 /100 WBCS
PLATELET # BLD AUTO: 249 THOUSANDS/UL (ref 149–390)
PMV BLD AUTO: 10.3 FL (ref 8.9–12.7)
POTASSIUM SERPL-SCNC: 3.8 MMOL/L (ref 3.5–5.3)
PROT SERPL-MCNC: 7.8 G/DL (ref 6.4–8.2)
RBC # BLD AUTO: 5.05 MILLION/UL (ref 3.81–5.12)
SODIUM SERPL-SCNC: 141 MMOL/L (ref 136–145)
TRIGL SERPL-MCNC: 104 MG/DL
TSH SERPL DL<=0.05 MIU/L-ACNC: 0.03 UIU/ML (ref 0.36–3.74)
WBC # BLD AUTO: 9.02 THOUSAND/UL (ref 4.31–10.16)

## 2018-11-26 PROCEDURE — 80061 LIPID PANEL: CPT

## 2018-11-26 PROCEDURE — 84443 ASSAY THYROID STIM HORMONE: CPT

## 2018-11-26 PROCEDURE — 36415 COLL VENOUS BLD VENIPUNCTURE: CPT

## 2018-11-26 PROCEDURE — 80053 COMPREHEN METABOLIC PANEL: CPT

## 2018-11-26 PROCEDURE — 85025 COMPLETE CBC W/AUTO DIFF WBC: CPT

## 2018-11-27 DIAGNOSIS — K21.9 GASTROESOPHAGEAL REFLUX DISEASE WITHOUT ESOPHAGITIS: Primary | ICD-10-CM

## 2018-11-27 RX ORDER — PANTOPRAZOLE SODIUM 40 MG/1
40 TABLET, DELAYED RELEASE ORAL DAILY
Qty: 90 TABLET | Refills: 3 | Status: SHIPPED | OUTPATIENT
Start: 2018-11-27 | End: 2019-07-11

## 2018-11-27 NOTE — TELEPHONE ENCOUNTER
----- Message from Pieter Samuel MD sent at 11/26/2018  7:20 PM EST -----  Labs ok, please call and inform patient

## 2018-11-27 NOTE — TELEPHONE ENCOUNTER
Patient called back; said that she saw on her phone- someone from our office called  Informed her that her labs were normal; per Dr Susana Núñez      Plus I told her the the script for pantoprazole (PROTONIX), 40 mg tablet was sent to the pharmacy around noon time today- [facility], [address], [place] [address]

## 2019-01-18 ENCOUNTER — OFFICE VISIT (OUTPATIENT)
Dept: INTERNAL MEDICINE CLINIC | Facility: CLINIC | Age: 59
End: 2019-01-18
Payer: COMMERCIAL

## 2019-01-18 VITALS
OXYGEN SATURATION: 95 % | SYSTOLIC BLOOD PRESSURE: 130 MMHG | HEIGHT: 65 IN | WEIGHT: 167.6 LBS | HEART RATE: 80 BPM | DIASTOLIC BLOOD PRESSURE: 80 MMHG | BODY MASS INDEX: 27.92 KG/M2

## 2019-01-18 DIAGNOSIS — R73.9 HYPERGLYCEMIA: ICD-10-CM

## 2019-01-18 DIAGNOSIS — F33.41 RECURRENT MAJOR DEPRESSIVE DISORDER, IN PARTIAL REMISSION (HCC): Primary | ICD-10-CM

## 2019-01-18 DIAGNOSIS — I10 ESSENTIAL HYPERTENSION: ICD-10-CM

## 2019-01-18 PROCEDURE — 99214 OFFICE O/P EST MOD 30 MIN: CPT | Performed by: INTERNAL MEDICINE

## 2019-01-18 RX ORDER — CLONIDINE HYDROCHLORIDE 0.2 MG/1
0.2 TABLET ORAL 2 TIMES DAILY
Refills: 2 | COMMUNITY
Start: 2018-12-05 | End: 2021-06-28

## 2019-01-18 NOTE — PROGRESS NOTES
INTERNAL MEDICINE OFFICE VISIT  [facility]  [facility] [address] [place] [address]  Tel: [phone]      NAME: Perri Garza  AGE: 61 y o  SEX: female  : 1960   MRN: [id number]    DATE: 2019  TIME: 8:24 AM      Assessment and Plan:  1  Recurrent major depressive disorder, in partial remission (HCC)  Continue Wellbutrin and Cymbalta  She is feeling much better after the addition of the Wellbutrin     - TSH, 3rd generation; Future    2  Hyperglycemia  She was told to cut down on the carbohydrate intake in her diet  - Hemoglobin A1C; Future    3  Essential hypertension  Continue to follow a low salt diet  Continue medications  - CBC and differential; Future  - Comprehensive metabolic panel; Future  - Lipid panel; Future      - Counseling Documentation: patient was counseled regarding: instructions for management, risk factor reductions, prognosis, patient and family education, impressions, risks and benefits of treatment options and importance of compliance with treatment  - Medication Side Effects: Adverse side effects of medications were reviewed with the patient/guardian today  Return for follow up visit in 4 months or earlier, if needed  Chief Complaint:  Chief Complaint   Patient presents with    Follow-up     2 month, review labs         History of Present Illness:   Depression-the patient was previously taking Cymbalta and still having a lot of symptoms of depression  At the last visit Wellbutrin was added and now she is feeling much better  Hyperglycemia-her hemoglobin A1c was on the higher side and she was made aware that she needs to cut down on the carbohydrate intake in her diet  Hypertension-she has been taking hydrochlorothiazide and clonidine and her blood pressure is good        Active Problem List:  Patient Active Problem List   Diagnosis    Acquired hypothyroidism    Essential hypertension    Recurrent major depressive disorder, in partial remission ([facility] )    Mixed hyperlipidemia    Gastroesophageal reflux disease without esophagitis    Opioid dependence ([facility] )    Hyperglycemia         Past Medical History:  Past Medical History:   Diagnosis Date    Abnormal ECG     Denial     Heart aneurysm     Heart murmur     Hypertension     Hypothyroidism     PAD [facility]peripheral artery disease) ([facility]          Past Surgical History:  Past Surgical History:   Procedure Laterality Date     SECTION           Family History:  Family History   Problem Relation Age of Onset    Coronary artery disease Father     Other Father         Denial    Other Mother         Denial         Social History:  Social History     Social History    Marital status:      Spouse name: N/A    Number of children: N/A    Years of education: N/A     Social History Main Topics    Smoking status: Current Every Day Smoker    Smokeless tobacco: Never Used    Alcohol use No    Drug use: No    Sexual activity: Yes     Partners: Male     Other Topics Concern    None     Social History Narrative    None         Allergies:   Allergies   Allergen Reactions    Penicillins          Medications:    Current Outpatient Prescriptions:     aspirin 81 MG tablet, Take 1 tablet by mouth daily, Disp: , Rfl:     atorvastatin (LIPITOR) 20 mg tablet, Take 1 tablet (20 mg total) by mouth daily, Disp: 30 tablet, Rfl: 5    buPROPion (WELLBUTRIN XL) 150 mg 24 hr tablet, Take 1 tablet (150 mg total) by mouth daily, Disp: 90 tablet, Rfl: 0    cloNIDine (CATAPRES) 0 2 mg tablet, Take 0 2 mg by mouth 2 (two) times a day, Disp: , Rfl: 2    DULoxetine (CYMBALTA) 60 mg delayed release capsule, Take 1 capsule (60 mg total) by mouth daily, Disp: 90 capsule, Rfl: 0    hydrochlorothiazide (HYDRODIURIL) 12 5 mg tablet, Take 1 tablet (12 5 mg total) by mouth daily, Disp: 90 tablet, Rfl: 1    levothyroxine 175 mcg tablet, Take 1 tablet by mouth daily, Disp: , Rfl:   lisinopril (ZESTRIL) 5 mg tablet, Take 1 tablet (5 mg total) by mouth daily, Disp: 90 tablet, Rfl: 1    pantoprazole (PROTONIX) 40 mg tablet, Take 1 tablet (40 mg total) by mouth daily, Disp: 90 tablet, Rfl: 3    SUBOXONE 8-2 MG, USE 2 & 1/2 FILMS UNDER TONGUE DAILY, Disp: , Rfl: 0      The following portions of the patient's history were reviewed and updated as appropriate: past medical history, past surgical history, family history, social history, allergies, current medications and active problem list       Review of Systems:  Constitutional: Denies fever, chills, weight gain, weight loss, fatigue  Eyes: Denies eye redness, eye discharge, double vision, change in visual acuity  ENT: Denies hearing loss, tinnitus, sneezing, nasal congestion, nasal discharge, sore throat   Respiratory: Denies cough, expectoration, hemoptysis, shortness of breath, wheezing  Cardiovascular: Denies chest pain, palpitations, lower extremity swelling, orthopnea, PND  Gastrointestinal: Denies abdominal pain, heartburn, nausea, vomiting, hematemesis, diarrhea, bloody stools  Genito-Urinary: Denies dysuria, frequency, difficulty in micturition, nocturia, incontinence  Musculoskeletal: Denies back pain, joint pain, muscle pain  Neurologic: Denies confusion, lightheadedness, syncope, headache, focal weakness, sensory changes, seizures  Endocrine: Denies polyuria, polydipsia, temperature intolerance  Allergy and Immunology: Denies hives, insect bite sensitivity  Hematological and Lymphatic: Denies bleeding problems, swollen glands   Psychological:  Has depression and some anxiety  Dermatological: Denies pruritus, rash, skin lesion changes      Vitals:  Vitals:    01/18/19 0807   BP: 130/80   Pulse: 80   SpO2: 95%       Body mass index is 27 89 kg/m²  Weight (last 2 days)     Date/Time   Weight    01/18/19 0807  76 (167 6)                Physical Examination:  General: Patient is not in acute distress   Awake, alert, responding to commands  No weight gain or loss  Head: Normocephalic  Atraumatic  Eyes: Conjunctiva and lids with no swelling, erythema or discharge  Both pupils normal sized, round and reactive to light  Sclera nonicteric  ENT: External examination of nose and ear normal  Otoscopic examination shows translucent tympanic membranes with patent canals without erythema  Oropharynx moist with no erythema, edema, exudate or lesions  Neck: Supple  JVP not raised  Trachea midline  No masses  No thyromegaly  Lungs: No signs of increased work of breathing or respiratory distress  Bilateral bronchovascular breath sounds with no crackles or rhonchi  Chest wall: No tenderness  Cardiovascular: Normal PMI  No thrills  Regular rate and rhythm  S1 and S2 normal  No murmur, rub or gallop  Gastrointestinal: Abdomen soft, nontender  No guarding or rigidity  Liver and spleen not palpable  Bowel sounds present  Neurologic: Cranial nerves II-XII intact   Cortical functions normal  Motor system - Reflexes 2+ and symmetrical  Sensations normal  Musculoskeletal: Gait normal  No joint tenderness  Integumentary: Skin normal with no rash or lesions  Lymphatic: No palpable lymph nodes in neck, axilla or groin  Extremities: No clubbing, cyanosis, edema or varicosities  Psychological: Judgement and insight normal   Depressed      Laboratory Results:  CBC with diff:   Lab Results   Component Value Date    WBC 9 02 11/26/2018    RBC 5 05 11/26/2018    HGB 15 2 11/26/2018    HCT 46 2 (H) 11/26/2018    MCV 92 11/26/2018    MCH 30 1 11/26/2018    RDW 13 1 11/26/2018     11/26/2018       CMP:  Lab Results   Component Value Date    CREATININE 0 62 11/26/2018    BUN 12 11/26/2018    K 3 8 11/26/2018     11/26/2018    CO2 33 (H) 11/26/2018    ALKPHOS 74 11/26/2018    ALT 40 11/26/2018    AST 34 11/26/2018       Lab Results   Component Value Date    HGBA1C 5 7 03/30/2018       No results found for: TROPONINI, CKMB, CKTOTAL    Lipid Profile:   No results found for: CHOL  Lab Results   Component Value Date    HDL 49 11/26/2018    HDL 53 03/30/2018     Lab Results   Component Value Date    LDLCALC 42 11/26/2018    LDLCALC 32 03/30/2018     Lab Results   Component Value Date    TRIG 104 11/26/2018    TRIG 60 03/30/2018         Health Maintenance:  Health Maintenance   Topic Date Due    Hepatitis C Screening  1960    Pneumococcal PPSV23 Medium Risk Adult (1 of 1 - PPSV23) 01/14/1979    DTaP,Tdap,and Td Vaccines (1 - Tdap) 01/14/1981    MAMMOGRAM  08/18/2016    PAP SMEAR  10/09/2018    CRC Screening: Colonoscopy  06/30/2027    INFLUENZA VACCINE  Completed     Immunization History   Administered Date(s) Administered    Influenza TIV (IM) 1960    Influenza, recombinant, quadrivalent,injectable, preservative free 11/16/2018    Pneumococcal Polysaccharide PPV23 1960    Tdap 1960    Zoster 1960         Anil Díaz MD  1/18/2019,8:24 AM

## 2019-03-01 DIAGNOSIS — E03.9 ACQUIRED HYPOTHYROIDISM: Primary | ICD-10-CM

## 2019-03-01 RX ORDER — LEVOTHYROXINE SODIUM 175 UG/1
TABLET ORAL
Qty: 90 TABLET | Refills: 3 | Status: SHIPPED | OUTPATIENT
Start: 2019-03-01 | End: 2019-07-11 | Stop reason: SDUPTHER

## 2019-05-22 ENCOUNTER — TELEPHONE (OUTPATIENT)
Dept: INTERNAL MEDICINE CLINIC | Facility: CLINIC | Age: 59
End: 2019-05-22

## 2019-06-04 ENCOUNTER — TELEPHONE (OUTPATIENT)
Dept: INTERNAL MEDICINE CLINIC | Facility: CLINIC | Age: 59
End: 2019-06-04

## 2019-06-05 DIAGNOSIS — I10 ESSENTIAL HYPERTENSION: ICD-10-CM

## 2019-06-05 RX ORDER — HYDROCHLOROTHIAZIDE 12.5 MG/1
TABLET ORAL
Qty: 90 TABLET | Refills: 1 | Status: SHIPPED | OUTPATIENT
Start: 2019-06-05 | End: 2019-10-27 | Stop reason: SDUPTHER

## 2019-06-18 DIAGNOSIS — I10 ESSENTIAL HYPERTENSION: ICD-10-CM

## 2019-06-18 RX ORDER — LISINOPRIL 5 MG/1
TABLET ORAL
Qty: 90 TABLET | Refills: 1 | Status: SHIPPED | OUTPATIENT
Start: 2019-06-18 | End: 2020-01-28

## 2019-07-11 ENCOUNTER — OFFICE VISIT (OUTPATIENT)
Dept: INTERNAL MEDICINE CLINIC | Facility: CLINIC | Age: 59
End: 2019-07-11

## 2019-07-11 VITALS
WEIGHT: 166.2 LBS | SYSTOLIC BLOOD PRESSURE: 124 MMHG | DIASTOLIC BLOOD PRESSURE: 82 MMHG | OXYGEN SATURATION: 94 % | BODY MASS INDEX: 27.66 KG/M2 | HEART RATE: 82 BPM

## 2019-07-11 DIAGNOSIS — K21.9 GASTROESOPHAGEAL REFLUX DISEASE WITHOUT ESOPHAGITIS: ICD-10-CM

## 2019-07-11 DIAGNOSIS — F17.210 CIGARETTE NICOTINE DEPENDENCE WITHOUT COMPLICATION: ICD-10-CM

## 2019-07-11 DIAGNOSIS — F33.41 RECURRENT MAJOR DEPRESSIVE DISORDER, IN PARTIAL REMISSION (HCC): ICD-10-CM

## 2019-07-11 DIAGNOSIS — R73.9 HYPERGLYCEMIA: ICD-10-CM

## 2019-07-11 DIAGNOSIS — E78.2 MIXED HYPERLIPIDEMIA: ICD-10-CM

## 2019-07-11 DIAGNOSIS — E66.3 OVERWEIGHT: ICD-10-CM

## 2019-07-11 DIAGNOSIS — E03.9 ACQUIRED HYPOTHYROIDISM: ICD-10-CM

## 2019-07-11 DIAGNOSIS — I10 ESSENTIAL HYPERTENSION: Primary | ICD-10-CM

## 2019-07-11 PROCEDURE — 99213 OFFICE O/P EST LOW 20 MIN: CPT | Performed by: INTERNAL MEDICINE

## 2019-07-11 RX ORDER — LEVOTHYROXINE SODIUM 175 UG/1
175 TABLET ORAL DAILY
Qty: 90 TABLET | Refills: 3 | Status: SHIPPED | OUTPATIENT
Start: 2019-07-11 | End: 2020-04-21

## 2019-07-11 RX ORDER — IBUPROFEN 600 MG/1
800 TABLET ORAL AS NEEDED
COMMUNITY
End: 2021-06-28

## 2019-07-11 NOTE — PROGRESS NOTES
INTERNAL MEDICINE OFFICE VISIT  [facility] AT [address]  Tel: [phone]      NAME: Jose Garza  AGE: 61 y o  SEX: female  : 1960   MRN: [id number]    DATE: 2019  TIME: 12:39 PM      Assessment and Plan:  1  Essential hypertension   continue present medications    2  Acquired hypothyroidism   continue levothyroxine at the same dose  - levothyroxine 175 mcg tablet; Take 1 tablet (175 mcg total) by mouth daily  Dispense: 90 tablet; Refill: 3    3  Mixed hyperlipidemia   continue atorvastatin    4  Hyperglycemia   she was told to cut down on the carbohydrate intake in her diet    5  Gastroesophageal reflux disease without esophagitis   she was told to take Tums as needed    6  Recurrent major depressive disorder, in partial remission (HCC)   continue Cymbalta    7  Overweight  BMI Counseling: Body mass index is 27 66 kg/m²  Discussed the patient's BMI with her  The BMI is above average  BMI counseling and education was provided to the patient  Nutrition recommendations include reducing portion sizes, decreasing overall calorie intake and moderation in carbohydrate intake  - Counseling Documentation: patient was counseled regarding: instructions for management, risk factor reductions, prognosis, patient and family education, impressions, risks and benefits of treatment options and importance of compliance with treatment  - Medication Side Effects: Adverse side effects of medications were reviewed with the patient/guardian today  Return for follow up visit in  4 months or earlier, if needed  Chief Complaint:  Chief Complaint   Patient presents with    Follow-up     4 month and labs- 2018         History of Present Illness:    hypertension- blood pressure stable on present medication      Hypothyroidism- takes levothyroxine but could not do blood work as she lost her insurance and it is very expensive to get labs done  Hyperlipidemia -takes atorvastatin regularly  Hyperglycemia - her hemoglobin A1c could not be done because she could not get her labs done  Her last fasting glucose was 104  GERD -she stopped taking the pantoprazole  Depression - she is taking the Cymbalta regularly  She was initially started on Wellbutrin but she could not tolerated  Current smoker -continues to smoke      Overweight -she knows she needs to  Lose weight      Active Problem List:  Patient Active Problem List   Diagnosis    Acquired hypothyroidism    Essential hypertension    Recurrent major depressive disorder, in partial remission ([facility] )    Mixed hyperlipidemia    Gastroesophageal reflux disease without esophagitis    Opioid dependence ([facility] )    Hyperglycemia    Overweight    Cigarette nicotine dependence without complication         Past Medical History:  Past Medical History:   Diagnosis Date    Abnormal ECG     Denial     Heart aneurysm     Heart murmur     Hypertension     Hypothyroidism     [facility]AD [facility]peripheral artery disease) ([facility])          Past Surgical History:  Past Surgical History:   Procedure Laterality Date     SECTION           Family History:  Family History   Problem Relation Age of Onset    Coronary artery disease Father     Other Father         Denial    Other Mother         Denial    Celiac disease Son     Celiac disease Son          Social History:  Social History     Socioeconomic History    Marital status:      Spouse name: None    Number of children: None    Years of education: None    Highest education level: None   Occupational History    None   Social Needs    Financial resource strain: None    Food insecurity:     Worry: None     Inability: None    Transportation needs:     Medical: None     Non-medical: None   Tobacco Use    Smoking status: Current Every Day Smoker     Packs/day: 1 00     Years: 20 00     Pack years: 20 00     Types: Cigarettes    Smokeless tobacco: Never Used   Substance and Sexual Activity    Alcohol use: No    Drug use: No    Sexual activity: Yes     Partners: Male   Lifestyle    Physical activity:     Days per week: 0 days     Minutes per session: 0 min    Stress: Very much   Relationships    Social connections:     Talks on phone: None     Gets together: None     Attends [employer] service: None     Active member of club or organization: None     Attends meetings of clubs or organizations: None     Relationship status: None    Intimate partner violence:     Fear of current or ex partner: None     Emotionally abused: None     Physically abused: None     Forced sexual activity: None   Other Topics Concern    None   Social History Narrative    None         Allergies:   Allergies   Allergen Reactions    Penicillins Itching         Medications:    Current Outpatient Medications:     aspirin 81 MG tablet, Take 1 tablet by mouth daily, Disp: , Rfl:     atorvastatin (LIPITOR) 20 mg tablet, Take 1 tablet (20 mg total) by mouth daily, Disp: 30 tablet, Rfl: 5    cloNIDine (CATAPRES) 0 2 mg tablet, Take 0 2 mg by mouth 2 (two) times a day, Disp: , Rfl: 2    DULoxetine (CYMBALTA) 60 mg delayed release capsule, Take 1 capsule (60 mg total) by mouth daily, Disp: 90 capsule, Rfl: 0    hydrochlorothiazide (HYDRODIURIL) 12 5 mg tablet, TAKE 1 TABLET BY MOUTH EVERY DAY, Disp: 90 tablet, Rfl: 1    ibuprofen (MOTRIN) 600 mg tablet, Take 600 mg by mouth as needed for mild pain, Disp: , Rfl:     levothyroxine 175 mcg tablet, Take 1 tablet (175 mcg total) by mouth daily, Disp: 90 tablet, Rfl: 3    lisinopril (ZESTRIL) 5 mg tablet, TAKE 1 TABLET BY MOUTH EVERY DAY, Disp: 90 tablet, Rfl: 1    SUBOXONE 8-2 MG, USE 2 & 1/2 FILMS UNDER TONGUE DAILY, Disp: , Rfl: 0      The following portions of the patient's history were reviewed and updated as appropriate: past medical history, past surgical history, family history, social history, allergies, current medications and active problem list       Review of Systems:  Constitutional: Denies fever, chills, weight gain, weight loss, fatigue  Eyes: Denies eye redness, eye discharge, double vision, change in visual acuity  ENT: Denies hearing loss, tinnitus, sneezing, nasal congestion, nasal discharge, sore throat   Respiratory: Denies cough, expectoration, hemoptysis, shortness of breath, wheezing  Cardiovascular: Denies chest pain, palpitations, lower extremity swelling, orthopnea, PND  Gastrointestinal: Denies abdominal pain, heartburn, nausea, vomiting, hematemesis, diarrhea, bloody stools  Genito-Urinary: Denies dysuria, frequency, difficulty in micturition, nocturia, incontinence  Musculoskeletal: Denies back pain, joint pain, muscle pain  Neurologic: Denies confusion, lightheadedness, syncope, headache, focal weakness, sensory changes, seizures  Endocrine: Denies polyuria, polydipsia, temperature intolerance  Allergy and Immunology: Denies hives, insect bite sensitivity  Hematological and Lymphatic: Denies bleeding problems, swollen glands   Psychological: Denies depression, suicidal ideation, anxiety, panic, mood swings  Dermatological: Denies pruritus, rash, skin lesion changes      Vitals:  Vitals:    07/11/19 0835   BP: 124/82   Pulse: 82   SpO2: 94%       Body mass index is 27 66 kg/m²  Weight (last 2 days)     Date/Time   Weight    07/11/19 0835   75 4 (166 2) w/ shoes denied off    Weight: w/ shoes denied off at 07/11/19 0835                Physical Examination:  General: Patient is not in acute distress  Awake, alert, responding to commands  No weight gain or loss  Head: Normocephalic  Atraumatic  Eyes: Conjunctiva and lids with no swelling, erythema or discharge  Both pupils normal sized, round and reactive to light  Sclera nonicteric  ENT: External examination of nose and ear normal  Otoscopic examination shows translucent tympanic membranes with patent canals without erythema   Oropharynx moist with no erythema, edema, exudate or lesions  Neck: Supple  JVP not raised  Trachea midline  No masses  No thyromegaly  Lungs: No signs of increased work of breathing or respiratory distress  Bilateral bronchovascular breath sounds with no crackles or rhonchi  Chest wall: No tenderness  Cardiovascular: Normal PMI  No thrills  Regular rate and rhythm  S1 and S2 normal  No murmur, rub or gallop  Gastrointestinal: Abdomen soft, nontender  No guarding or rigidity  Liver and spleen not palpable  Bowel sounds present  Neurologic: Cranial nerves II-XII intact   Cortical functions normal  Motor system - Reflexes 2+ and symmetrical  Sensations normal  Musculoskeletal: Gait normal  No joint tenderness  Integumentary: Skin normal with no rash or lesions  Lymphatic: No palpable lymph nodes in neck, axilla or groin  Extremities: No clubbing, cyanosis, edema or varicosities  Psychological: Judgement and insight normal  Mood and affect normal      Laboratory Results:  CBC with diff:   Lab Results   Component Value Date    WBC 9 02 11/26/2018    RBC 5 05 11/26/2018    HGB 15 2 11/26/2018    HCT 46 2 (H) 11/26/2018    MCV 92 11/26/2018    MCH 30 1 11/26/2018    RDW 13 1 11/26/2018     11/26/2018       CMP:  Lab Results   Component Value Date    CREATININE 0 62 11/26/2018    BUN 12 11/26/2018    K 3 8 11/26/2018     11/26/2018    CO2 33 (H) 11/26/2018    ALKPHOS 74 11/26/2018    ALT 40 11/26/2018    AST 34 11/26/2018       Lab Results   Component Value Date    HGBA1C 5 7 03/30/2018       No results found for: TROPONINI, CKMB, CKTOTAL    Lipid Profile:   No results found for: CHOL  Lab Results   Component Value Date    HDL 49 11/26/2018    HDL 53 03/30/2018     Lab Results   Component Value Date    LDLCALC 42 11/26/2018    LDLCALC 32 03/30/2018     Lab Results   Component Value Date    TRIG 104 11/26/2018    TRIG 60 03/30/2018       Imaging Results:  VAS abdominal aorta/iliacs; complete study     [facility] REPORT  CLINICAL:  Indications:  Patient presents for screening secondary to strong family history of aneurysms  Patient is asymptomatic at this time  Risk Factors  The patient has history of hypertension  She has no history of obesity  Clinical  Right Pressure:  134/80 mm Hg  FINDINGS:     Unilateral       Impression  PSV (cm/s)  EDV (cm/s)  AP (cm)  TRV (cm)    Sup-Roxann Ao                          121          22      1 7              Jux Renal Aorta                                                    1 9    Px Inf-Kt Ao                                            1 8       1 8    Ds Inf-Kt Ao                                            1 8              Celiac           >70%               232          63                       Prox  SMA                           195          33      0 8                 Right       Impression  PSV (cm/s)  EDV (cm/s)  AP (cm)   RAR    Prox AP                       118          11      1 1          Dist AP                       134          11      1 0          Prox  EIA                      132          25                   Dist EIA    50-75%             208          26      1 0          Prox Renal                     170          37           1 41       Left        PSV (cm/s)  EDV (cm/s)  AP (cm)    Prox AP           125           0      1 3    Dist AP           121          12      1 1    Prox  EIA          139          14             Dist EIA           160          13      1 0    Prox Renal         114          31                      CONCLUSION:  Impression  The abdominal aorta is patent and normal in caliber  The celiac artery displays a 70% or greater stenosis  The right distal external iliac artery displays a 50-75% stenosis  Bilateral common and external iliac arteries are patent and normal in caliber  The superior mesenteric, and bilateral proximal renal arteries are patent       SIGNATURE:  Electronically Signed by: Madhu Jimenez MD, [address] on 2017-06-12 01:22:47 PM       Health Maintenance:  Health Maintenance   Topic Date Due    Hepatitis C Screening  1960    Pneumococcal Vaccine: Pediatrics (0 to 5 Years) and At-Risk Patients (6 to 59 Years) (1 of 1 - PPSV23) 01/14/1966    BMI: Followup Plan  01/14/1978    DTaP,Tdap,and Td Vaccines (1 - Tdap) 01/14/1981    MAMMOGRAM  08/18/2015    INFLUENZA VACCINE  07/01/2019    PAP SMEAR  05/22/2020    BMI: Adult  07/11/2020    Pneumococcal Vaccine: 65+ Years (1 of 2 - PCV13) 01/14/2025    CRC Screening: Colonoscopy  06/30/2027    HEPATITIS B VACCINES  Aged Out     Immunization History   Administered Date(s) Administered    INFLUENZA 11/16/2018    Influenza TIV (IM) 1960    Influenza, recombinant, quadrivalent,injectable, preservative free 11/16/2018    Pneumococcal Polysaccharide PPV23 1960    Tdap 1960    Zoster 1960         Radha Piña MD  7/11/2019,12:39 PM

## 2019-07-27 DIAGNOSIS — F33.41 RECURRENT MAJOR DEPRESSIVE DISORDER, IN PARTIAL REMISSION (HCC): ICD-10-CM

## 2019-07-27 RX ORDER — DULOXETIN HYDROCHLORIDE 60 MG/1
CAPSULE, DELAYED RELEASE ORAL
Qty: 30 CAPSULE | Refills: 2 | Status: SHIPPED | OUTPATIENT
Start: 2019-07-27 | End: 2019-08-26 | Stop reason: SDUPTHER

## 2019-08-05 ENCOUNTER — TELEPHONE (OUTPATIENT)
Dept: INTERNAL MEDICINE CLINIC | Facility: CLINIC | Age: 59
End: 2019-08-05

## 2019-08-05 DIAGNOSIS — N39.0 URINARY TRACT INFECTION WITHOUT HEMATURIA, SITE UNSPECIFIED: Primary | ICD-10-CM

## 2019-08-05 RX ORDER — NITROFURANTOIN 25; 75 MG/1; MG/1
100 CAPSULE ORAL 2 TIMES DAILY
Qty: 14 CAPSULE | Refills: 0 | Status: SHIPPED | OUTPATIENT
Start: 2019-08-05 | End: 2019-08-12

## 2019-08-05 NOTE — TELEPHONE ENCOUNTER
PATIENT HAS A VERY BAD URINARY TRACT INFECTION   NEEDS ANTIBIOTIC CALLED INTO THE PHARMACY   [facility]   [phone]  PATIENT #   [phone]

## 2019-08-26 DIAGNOSIS — F33.41 RECURRENT MAJOR DEPRESSIVE DISORDER, IN PARTIAL REMISSION (HCC): ICD-10-CM

## 2019-08-26 RX ORDER — DULOXETIN HYDROCHLORIDE 60 MG/1
CAPSULE, DELAYED RELEASE ORAL
Qty: 30 CAPSULE | Refills: 2 | Status: SHIPPED | OUTPATIENT
Start: 2019-08-26 | End: 2019-11-02 | Stop reason: SDUPTHER

## 2019-09-16 ENCOUNTER — TELEPHONE (OUTPATIENT)
Dept: INTERNAL MEDICINE CLINIC | Facility: CLINIC | Age: 59
End: 2019-09-16

## 2019-09-16 DIAGNOSIS — R39.9 UTI SYMPTOMS: Primary | ICD-10-CM

## 2019-09-16 DIAGNOSIS — B37.9 YEAST INFECTION: ICD-10-CM

## 2019-09-16 RX ORDER — FLUCONAZOLE 150 MG/1
150 TABLET ORAL ONCE
Qty: 1 TABLET | Refills: 0 | Status: SHIPPED | OUTPATIENT
Start: 2019-09-16 | End: 2019-09-16

## 2019-09-16 RX ORDER — NITROFURANTOIN 25; 75 MG/1; MG/1
100 CAPSULE ORAL 2 TIMES DAILY
Qty: 10 CAPSULE | Refills: 0 | Status: SHIPPED | OUTPATIENT
Start: 2019-09-16 | End: 2019-09-21

## 2019-09-16 NOTE — TELEPHONE ENCOUNTER
Patient is still having UTI/yeast infection symptoms     She cannot make an appt this week, she is requesting that Dr Swift call her in an antibiotic and yeast pills    Call back # [phone] [facility] # [phone]

## 2019-09-16 NOTE — TELEPHONE ENCOUNTER
Patient picked up the medication and started staking it but the infection is a lot worse than the last time  She said she asked for a stronger antibiotic than what she was on before and she was given the exact same medication  She picked up the RX we just sent today but only took one dose  Please advise   She said she's certain this wont help much    PH#[phone]

## 2019-09-17 DIAGNOSIS — R39.9 UTI SYMPTOMS: Primary | ICD-10-CM

## 2019-09-17 NOTE — TELEPHONE ENCOUNTER
Please tell her to get a urine culture 1st and then continue the antibiotic  When the urine culture is back, the medication can be changed accordingly

## 2019-09-26 ENCOUNTER — APPOINTMENT (OUTPATIENT)
Dept: LAB | Facility: CLINIC | Age: 59
End: 2019-09-26
Payer: COMMERCIAL

## 2019-09-26 DIAGNOSIS — R39.9 UTI SYMPTOMS: ICD-10-CM

## 2019-09-26 LAB
BACTERIA UR QL AUTO: NORMAL /HPF
BILIRUB UR QL STRIP: NEGATIVE
CLARITY UR: CLEAR
COLOR UR: YELLOW
GLUCOSE UR STRIP-MCNC: NEGATIVE MG/DL
HGB UR QL STRIP.AUTO: NEGATIVE
HYALINE CASTS #/AREA URNS LPF: NORMAL /LPF
KETONES UR STRIP-MCNC: NEGATIVE MG/DL
LEUKOCYTE ESTERASE UR QL STRIP: NEGATIVE
NITRITE UR QL STRIP: NEGATIVE
NON-SQ EPI CELLS URNS QL MICRO: NORMAL /HPF
PH UR STRIP.AUTO: 6.5 [PH]
PROT UR STRIP-MCNC: NEGATIVE MG/DL
RBC #/AREA URNS AUTO: NORMAL /HPF
SP GR UR STRIP.AUTO: 1.01 (ref 1–1.03)
UROBILINOGEN UR QL STRIP.AUTO: 1 E.U./DL
WBC #/AREA URNS AUTO: NORMAL /HPF

## 2019-09-26 PROCEDURE — 87086 URINE CULTURE/COLONY COUNT: CPT

## 2019-09-26 PROCEDURE — 81001 URINALYSIS AUTO W/SCOPE: CPT | Performed by: INTERNAL MEDICINE

## 2019-09-26 NOTE — TELEPHONE ENCOUNTER
Patient called and stated that she has no insurance the pain and itching is very bad-this has been going on for 1 month    She would like a stronger antibiotic called in    I saw in the mssg about the culture and urinalysis    I explained to the pt that she needs to get it done before you will call in another antibiotic, she has no insurance and is unable to pay for the culture      What should she do?     # [phone]

## 2019-09-27 DIAGNOSIS — R39.9 UTI SYMPTOMS: Primary | ICD-10-CM

## 2019-09-27 LAB — BACTERIA UR CULT: NORMAL

## 2019-09-27 RX ORDER — CIPROFLOXACIN 500 MG/1
500 TABLET, FILM COATED ORAL EVERY 12 HOURS SCHEDULED
Qty: 14 TABLET | Refills: 0 | Status: SHIPPED | OUTPATIENT
Start: 2019-09-27 | End: 2019-10-04

## 2019-09-28 ENCOUNTER — TELEPHONE (OUTPATIENT)
Dept: INTERNAL MEDICINE CLINIC | Facility: CLINIC | Age: 59
End: 2019-09-28

## 2019-09-28 NOTE — TELEPHONE ENCOUNTER
----- Message from Cortes Caupto MD sent at 9/28/2019  8:36 AM EDT -----  Urine culture does not show a UTI

## 2019-10-27 DIAGNOSIS — I10 ESSENTIAL HYPERTENSION: ICD-10-CM

## 2019-10-28 RX ORDER — HYDROCHLOROTHIAZIDE 12.5 MG/1
TABLET ORAL
Qty: 90 TABLET | Refills: 1 | Status: SHIPPED | OUTPATIENT
Start: 2019-10-28 | End: 2020-04-27

## 2019-11-02 DIAGNOSIS — F33.41 RECURRENT MAJOR DEPRESSIVE DISORDER, IN PARTIAL REMISSION (HCC): ICD-10-CM

## 2019-11-04 RX ORDER — DULOXETIN HYDROCHLORIDE 60 MG/1
CAPSULE, DELAYED RELEASE ORAL
Qty: 30 CAPSULE | Refills: 2 | Status: SHIPPED | OUTPATIENT
Start: 2019-11-04 | End: 2019-12-05 | Stop reason: SDUPTHER

## 2019-12-05 DIAGNOSIS — F33.41 RECURRENT MAJOR DEPRESSIVE DISORDER, IN PARTIAL REMISSION (HCC): ICD-10-CM

## 2019-12-05 RX ORDER — DULOXETIN HYDROCHLORIDE 60 MG/1
CAPSULE, DELAYED RELEASE ORAL
Qty: 30 CAPSULE | Refills: 2 | Status: SHIPPED | OUTPATIENT
Start: 2019-12-05 | End: 2019-12-13 | Stop reason: SDUPTHER

## 2019-12-13 ENCOUNTER — OFFICE VISIT (OUTPATIENT)
Dept: INTERNAL MEDICINE CLINIC | Facility: CLINIC | Age: 59
End: 2019-12-13

## 2019-12-13 VITALS
OXYGEN SATURATION: 94 % | BODY MASS INDEX: 27.16 KG/M2 | SYSTOLIC BLOOD PRESSURE: 112 MMHG | DIASTOLIC BLOOD PRESSURE: 84 MMHG | WEIGHT: 163.2 LBS | TEMPERATURE: 98.3 F | HEART RATE: 74 BPM

## 2019-12-13 DIAGNOSIS — E78.2 MIXED HYPERLIPIDEMIA: ICD-10-CM

## 2019-12-13 DIAGNOSIS — Z12.31 SCREENING MAMMOGRAM, ENCOUNTER FOR: ICD-10-CM

## 2019-12-13 DIAGNOSIS — J20.9 ACUTE BRONCHITIS, UNSPECIFIED ORGANISM: Primary | ICD-10-CM

## 2019-12-13 DIAGNOSIS — F33.41 RECURRENT MAJOR DEPRESSIVE DISORDER, IN PARTIAL REMISSION (HCC): ICD-10-CM

## 2019-12-13 PROCEDURE — 99213 OFFICE O/P EST LOW 20 MIN: CPT | Performed by: INTERNAL MEDICINE

## 2019-12-13 RX ORDER — GUAIFENESIN 600 MG
600 TABLET, EXTENDED RELEASE 12 HR ORAL EVERY 12 HOURS SCHEDULED
COMMUNITY
End: 2021-06-28

## 2019-12-13 RX ORDER — ALBUTEROL SULFATE 90 UG/1
2 AEROSOL, METERED RESPIRATORY (INHALATION) EVERY 6 HOURS PRN
Qty: 1 INHALER | Refills: 3 | Status: SHIPPED | OUTPATIENT
Start: 2019-12-13 | End: 2021-06-28 | Stop reason: SDUPTHER

## 2019-12-13 RX ORDER — ATORVASTATIN CALCIUM 20 MG/1
20 TABLET, FILM COATED ORAL DAILY
Qty: 90 TABLET | Refills: 2 | Status: SHIPPED | OUTPATIENT
Start: 2019-12-13 | End: 2020-09-08

## 2019-12-13 RX ORDER — LEVOFLOXACIN 500 MG/1
500 TABLET, FILM COATED ORAL EVERY 24 HOURS
Qty: 7 TABLET | Refills: 0 | Status: SHIPPED | OUTPATIENT
Start: 2019-12-13 | End: 2019-12-20

## 2019-12-13 RX ORDER — DULOXETIN HYDROCHLORIDE 60 MG/1
60 CAPSULE, DELAYED RELEASE ORAL DAILY
Qty: 90 CAPSULE | Refills: 2 | Status: SHIPPED | OUTPATIENT
Start: 2019-12-13 | End: 2020-06-17

## 2019-12-13 NOTE — PROGRESS NOTES
INTERNAL MEDICINE FOLLOW-UP OFFICE VISIT  [facility]    NAME: Ashley Payment  AGE: 61 y o  SEX: female  : 1960   MRN: [id number]    DATE: 2019  TIME: 4:47 PM    Assessment and Plan     Diagnoses and all orders for this visit:    Acute bronchitis, unspecified organism  -     levofloxacin (LEVAQUIN) 500 mg tablet; Take 1 tablet (500 mg total) by mouth every 24 hours for 7 days  -     albuterol (VENTOLIN HFA) 90 mcg/act inhaler; Inhale 2 puffs every 6 (six) hours as needed for wheezing    Mixed hyperlipidemia  -     atorvastatin (LIPITOR) 20 mg tablet; Take 1 tablet (20 mg total) by mouth daily    Recurrent major depressive disorder, in partial remission (HCC)  -     DULoxetine (CYMBALTA) 60 mg delayed release capsule; Take 1 capsule (60 mg total) by mouth daily    Screening mammogram, encounter for  -     Mammo screening bilateral w 3d & cad; Future    Other orders  -     guaiFENesin (MUCINEX) 600 mg 12 hr tablet; Take 600 mg by mouth every 12 (twelve) hours        - Counseling Documentation: patient was counseled regarding: instructions for management, risk factor reductions, prognosis, patient and family education, impressions, risks and benefits of treatment options and importance of compliance with treatment  - Medication Side Effects: Adverse side effects of medications were reviewed with the patient/guardian today  Return to office in: as needed    Chief Complaint     Chief Complaint   Patient presents with    Cough     started; 2 month, progressed, 4 weeks  productive;green    Ear Fullness     both    Nasal Congestion     stuffy nose       History of Present Illness     Cough   This is a new problem  The current episode started more than 1 month ago  The problem has been gradually worsening  The problem occurs every few minutes  The cough is productive of purulent sputum   Associated symptoms include chest pain, chills, nasal congestion, a sore throat and wheezing  Pertinent negatives include no ear pain, eye redness, fever, headaches, myalgias, postnasal drip, rash, rhinorrhea or shortness of breath  Nothing aggravates the symptoms  She has tried nothing for the symptoms  The following portions of the patient's history were reviewed and updated as appropriate: allergies, current medications, past family history, past medical history, past social history, past surgical history and problem list     Review of Systems     Review of Systems   Constitutional: Positive for chills  Negative for diaphoresis, fatigue and fever  HENT: Positive for sore throat  Negative for congestion, ear discharge, ear pain, hearing loss, postnasal drip, rhinorrhea, sinus pressure, sinus pain, sneezing and voice change  Eyes: Negative for pain, discharge, redness and visual disturbance  Respiratory: Positive for cough and wheezing  Negative for chest tightness and shortness of breath  Cardiovascular: Positive for chest pain  Negative for palpitations and leg swelling  Gastrointestinal: Negative for abdominal distention, abdominal pain, blood in stool, constipation, diarrhea, nausea and vomiting  Endocrine: Negative for cold intolerance, heat intolerance, polydipsia, polyphagia and polyuria  Genitourinary: Negative for dysuria, flank pain, frequency, hematuria and urgency  Musculoskeletal: Negative for arthralgias, back pain, gait problem, joint swelling, myalgias, neck pain and neck stiffness  Skin: Negative for rash  Neurological: Negative for dizziness, tremors, syncope, facial asymmetry, speech difficulty, weakness, light-headedness, numbness and headaches  Hematological: Does not bruise/bleed easily  Psychiatric/Behavioral: Negative for behavioral problems, confusion and sleep disturbance  The patient is not nervous/anxious          Active Problem List     Patient Active Problem List   Diagnosis    Acquired hypothyroidism    Essential hypertension    Recurrent major depressive disorder, in partial remission (HCC)    Mixed hyperlipidemia    Gastroesophageal reflux disease without esophagitis    Opioid dependence (HCC)    Hyperglycemia    Overweight    Cigarette nicotine dependence without complication       Objective     /84 (BP Location: Left arm, Patient Position: Sitting, Cuff Size: Standard)   Pulse 74   Temp 98 3 °F (36 8 °C) (Tympanic) Comment: w/ aspirin  Wt 74 kg (163 lb 3 2 oz) Comment: w/ shoes denied off  SpO2 94%   BMI 27 16 kg/m²     Physical Exam   Constitutional: She is oriented to person, place, and time  She appears well-developed and well-nourished  No distress  HENT:   Head: Normocephalic and atraumatic  Right Ear: External ear normal    Left Ear: External ear normal    Nose: Nose normal    Mouth/Throat: Oropharynx is clear and moist    Eyes: Conjunctivae and EOM are normal  Right eye exhibits no discharge  Left eye exhibits no discharge  No scleral icterus  Neck: Normal range of motion  Neck supple  No JVD present  No tracheal deviation present  No thyromegaly present  Cardiovascular: Normal rate, regular rhythm, normal heart sounds and intact distal pulses  Exam reveals no gallop and no friction rub  No murmur heard  Pulmonary/Chest: Effort normal  No respiratory distress  She has no wheezes  She has rales  She exhibits no tenderness  Abdominal: Soft  Bowel sounds are normal  She exhibits no distension  There is no tenderness  There is no rebound and no guarding  Musculoskeletal: Normal range of motion  She exhibits no edema or tenderness  Lymphadenopathy:     She has no cervical adenopathy  Neurological: She is alert and oriented to person, place, and time  No cranial nerve deficit  She exhibits normal muscle tone  Coordination normal    Skin: Skin is warm and dry  No rash noted  She is not diaphoretic  No erythema  Psychiatric: She has a normal mood and affect   Judgment normal            Current Medications       Current Outpatient Medications:     aspirin 81 MG tablet, Take 1 tablet by mouth daily, Disp: , Rfl:     cloNIDine (CATAPRES) 0 2 mg tablet, Take 0 2 mg by mouth 2 (two) times a day, Disp: , Rfl: 2    DULoxetine (CYMBALTA) 60 mg delayed release capsule, Take 1 capsule (60 mg total) by mouth daily, Disp: 90 capsule, Rfl: 2    guaiFENesin (MUCINEX) 600 mg 12 hr tablet, Take 600 mg by mouth every 12 (twelve) hours, Disp: , Rfl:     hydrochlorothiazide (HYDRODIURIL) 12 5 mg tablet, TAKE 1 TABLET BY MOUTH EVERY DAY, Disp: 90 tablet, Rfl: 1    ibuprofen (MOTRIN) 600 mg tablet, Take 600 mg by mouth as needed for mild pain, Disp: , Rfl:     levothyroxine 175 mcg tablet, Take 1 tablet (175 mcg total) by mouth daily, Disp: 90 tablet, Rfl: 3    lisinopril (ZESTRIL) 5 mg tablet, TAKE 1 TABLET BY MOUTH EVERY DAY, Disp: 90 tablet, Rfl: 1    SUBOXONE 8-2 MG, USE 2 & 1/2 FILMS UNDER TONGUE DAILY, Disp: , Rfl: 0    albuterol (VENTOLIN HFA) 90 mcg/act inhaler, Inhale 2 puffs every 6 (six) hours as needed for wheezing, Disp: 1 Inhaler, Rfl: 3    atorvastatin (LIPITOR) 20 mg tablet, Take 1 tablet (20 mg total) by mouth daily, Disp: 90 tablet, Rfl: 2    levofloxacin (LEVAQUIN) 500 mg tablet, Take 1 tablet (500 mg total) by mouth every 24 hours for 7 days, Disp: 7 tablet, Rfl: 0    Health Maintenance     Health Maintenance   Topic Date Due    Hepatitis C Screening  1960    Pneumococcal Vaccine: Pediatrics (0 to 5 Years) and At-Risk Patients (6 to 59 Years) (1 of 1 - PPSV23) 01/14/1966    DTaP,Tdap,and Td Vaccines (1 - Tdap) 01/14/1971    HIV Screening  01/14/1975    MAMMOGRAM  08/18/2015    Influenza Vaccine  07/01/2019    Cervical Cancer Screening  05/22/2020    BMI: Followup Plan  07/11/2020    BMI: Adult  12/13/2020    Pneumococcal Vaccine: 65+ Years (1 of 2 - PCV13) 01/14/2025    CRC Screening: Colonoscopy  06/30/2027    HIB Vaccine  Aged Out    Hepatitis B Vaccine  Aged Out    IPV Vaccine  Aged Out    Hepatitis A Vaccine  Aged Out    Meningococcal ACWY Vaccine  Aged Out    HPV Vaccine  Aged Out     Immunization History   Administered Date(s) Administered    INFLUENZA 11/16/2018    Influenza TIV (IM) 1960    Influenza, recombinant, quadrivalent,injectable, preservative free 11/16/2018    Pneumococcal Polysaccharide PPV23 1960    Tdap 1960    Zoster 1960         Mike Palomino MD  [address] [place] [address]

## 2020-01-16 ENCOUNTER — TELEPHONE (OUTPATIENT)
Dept: INTERNAL MEDICINE CLINIC | Facility: CLINIC | Age: 60
End: 2020-01-16

## 2020-01-28 DIAGNOSIS — I10 ESSENTIAL HYPERTENSION: ICD-10-CM

## 2020-01-28 RX ORDER — LISINOPRIL 5 MG/1
TABLET ORAL
Qty: 90 TABLET | Refills: 0 | Status: SHIPPED | OUTPATIENT
Start: 2020-01-28 | End: 2020-05-26

## 2020-04-03 ENCOUNTER — TELEPHONE (OUTPATIENT)
Dept: INTERNAL MEDICINE CLINIC | Facility: CLINIC | Age: 60
End: 2020-04-03

## 2020-04-20 DIAGNOSIS — E03.9 ACQUIRED HYPOTHYROIDISM: ICD-10-CM

## 2020-04-21 RX ORDER — LEVOTHYROXINE SODIUM 175 UG/1
TABLET ORAL
Qty: 90 TABLET | Refills: 3 | Status: SHIPPED | OUTPATIENT
Start: 2020-04-21 | End: 2020-09-23 | Stop reason: SDUPTHER

## 2020-04-25 DIAGNOSIS — I10 ESSENTIAL HYPERTENSION: ICD-10-CM

## 2020-04-27 RX ORDER — HYDROCHLOROTHIAZIDE 12.5 MG/1
TABLET ORAL
Qty: 90 TABLET | Refills: 1 | Status: SHIPPED | OUTPATIENT
Start: 2020-04-27 | End: 2021-01-11

## 2020-05-16 ENCOUNTER — TELEPHONE (OUTPATIENT)
Dept: INTERNAL MEDICINE CLINIC | Facility: CLINIC | Age: 60
End: 2020-05-16

## 2020-05-16 DIAGNOSIS — R39.9 UTI SYMPTOMS: Primary | ICD-10-CM

## 2020-05-16 RX ORDER — CIPROFLOXACIN 500 MG/1
500 TABLET, FILM COATED ORAL EVERY 12 HOURS SCHEDULED
Qty: 14 TABLET | Refills: 0 | Status: SHIPPED | OUTPATIENT
Start: 2020-05-16 | End: 2020-05-23

## 2020-05-24 DIAGNOSIS — I10 ESSENTIAL HYPERTENSION: ICD-10-CM

## 2020-05-26 RX ORDER — LISINOPRIL 5 MG/1
TABLET ORAL
Qty: 90 TABLET | Refills: 0 | Status: SHIPPED | OUTPATIENT
Start: 2020-05-26 | End: 2020-07-28

## 2020-06-09 ENCOUNTER — TELEPHONE (OUTPATIENT)
Dept: INTERNAL MEDICINE CLINIC | Facility: CLINIC | Age: 60
End: 2020-06-09

## 2020-06-09 DIAGNOSIS — R73.9 HYPERGLYCEMIA: ICD-10-CM

## 2020-06-09 DIAGNOSIS — I10 ESSENTIAL HYPERTENSION: Primary | ICD-10-CM

## 2020-06-17 DIAGNOSIS — F33.41 RECURRENT MAJOR DEPRESSIVE DISORDER, IN PARTIAL REMISSION (HCC): ICD-10-CM

## 2020-06-17 RX ORDER — DULOXETIN HYDROCHLORIDE 60 MG/1
CAPSULE, DELAYED RELEASE ORAL
Qty: 30 CAPSULE | Refills: 2 | Status: SHIPPED | OUTPATIENT
Start: 2020-06-17 | End: 2020-09-08

## 2020-07-28 DIAGNOSIS — I10 ESSENTIAL HYPERTENSION: ICD-10-CM

## 2020-07-28 RX ORDER — LISINOPRIL 5 MG/1
TABLET ORAL
Qty: 90 TABLET | Refills: 0 | Status: SHIPPED | OUTPATIENT
Start: 2020-07-28 | End: 2020-09-08

## 2020-09-06 DIAGNOSIS — I10 ESSENTIAL HYPERTENSION: ICD-10-CM

## 2020-09-06 DIAGNOSIS — E78.2 MIXED HYPERLIPIDEMIA: ICD-10-CM

## 2020-09-06 DIAGNOSIS — F33.41 RECURRENT MAJOR DEPRESSIVE DISORDER, IN PARTIAL REMISSION (HCC): ICD-10-CM

## 2020-09-08 RX ORDER — ATORVASTATIN CALCIUM 20 MG/1
TABLET, FILM COATED ORAL
Qty: 90 TABLET | Refills: 2 | Status: SHIPPED | OUTPATIENT
Start: 2020-09-08 | End: 2021-07-13

## 2020-09-08 RX ORDER — LISINOPRIL 5 MG/1
TABLET ORAL
Qty: 90 TABLET | Refills: 0 | Status: SHIPPED | OUTPATIENT
Start: 2020-09-08 | End: 2021-02-01

## 2020-09-08 RX ORDER — DULOXETIN HYDROCHLORIDE 60 MG/1
CAPSULE, DELAYED RELEASE ORAL
Qty: 30 CAPSULE | Refills: 2 | Status: SHIPPED | OUTPATIENT
Start: 2020-09-08 | End: 2020-12-14

## 2020-09-11 ENCOUNTER — APPOINTMENT (OUTPATIENT)
Dept: LAB | Facility: CLINIC | Age: 60
End: 2020-09-11
Payer: COMMERCIAL

## 2020-09-11 DIAGNOSIS — I10 ESSENTIAL HYPERTENSION: ICD-10-CM

## 2020-09-11 DIAGNOSIS — R73.9 HYPERGLYCEMIA: ICD-10-CM

## 2020-09-11 LAB
ALBUMIN SERPL BCP-MCNC: 3.6 G/DL (ref 3.5–5)
ALP SERPL-CCNC: 63 U/L (ref 46–116)
ALT SERPL W P-5'-P-CCNC: 42 U/L (ref 12–78)
ANION GAP SERPL CALCULATED.3IONS-SCNC: 4 MMOL/L (ref 4–13)
AST SERPL W P-5'-P-CCNC: 35 U/L (ref 5–45)
BASOPHILS # BLD AUTO: 0.04 THOUSANDS/ΜL (ref 0–0.1)
BASOPHILS NFR BLD AUTO: 1 % (ref 0–1)
BILIRUB SERPL-MCNC: 0.71 MG/DL (ref 0.2–1)
BUN SERPL-MCNC: 10 MG/DL (ref 5–25)
CALCIUM SERPL-MCNC: 9.1 MG/DL (ref 8.3–10.1)
CHLORIDE SERPL-SCNC: 107 MMOL/L (ref 100–108)
CHOLEST SERPL-MCNC: 103 MG/DL (ref 50–200)
CO2 SERPL-SCNC: 29 MMOL/L (ref 21–32)
CREAT SERPL-MCNC: 0.58 MG/DL (ref 0.6–1.3)
EOSINOPHIL # BLD AUTO: 0.12 THOUSAND/ΜL (ref 0–0.61)
EOSINOPHIL NFR BLD AUTO: 2 % (ref 0–6)
ERYTHROCYTE [DISTWIDTH] IN BLOOD BY AUTOMATED COUNT: 13.1 % (ref 11.6–15.1)
EST. AVERAGE GLUCOSE BLD GHB EST-MCNC: 114 MG/DL
GFR SERPL CREATININE-BSD FRML MDRD: 101 ML/MIN/1.73SQ M
GLUCOSE P FAST SERPL-MCNC: 84 MG/DL (ref 65–99)
HBA1C MFR BLD: 5.6 %
HCT VFR BLD AUTO: 45.6 % (ref 34.8–46.1)
HDLC SERPL-MCNC: 49 MG/DL
HGB BLD-MCNC: 15.2 G/DL (ref 11.5–15.4)
IMM GRANULOCYTES # BLD AUTO: 0.02 THOUSAND/UL (ref 0–0.2)
IMM GRANULOCYTES NFR BLD AUTO: 0 % (ref 0–2)
LDLC SERPL CALC-MCNC: 30 MG/DL (ref 0–100)
LYMPHOCYTES # BLD AUTO: 2.48 THOUSANDS/ΜL (ref 0.6–4.47)
LYMPHOCYTES NFR BLD AUTO: 38 % (ref 14–44)
MCH RBC QN AUTO: 31.1 PG (ref 26.8–34.3)
MCHC RBC AUTO-ENTMCNC: 33.3 G/DL (ref 31.4–37.4)
MCV RBC AUTO: 93 FL (ref 82–98)
MONOCYTES # BLD AUTO: 0.65 THOUSAND/ΜL (ref 0.17–1.22)
MONOCYTES NFR BLD AUTO: 10 % (ref 4–12)
NEUTROPHILS # BLD AUTO: 3.23 THOUSANDS/ΜL (ref 1.85–7.62)
NEUTS SEG NFR BLD AUTO: 49 % (ref 43–75)
NONHDLC SERPL-MCNC: 54 MG/DL
NRBC BLD AUTO-RTO: 0 /100 WBCS
PLATELET # BLD AUTO: 229 THOUSANDS/UL (ref 149–390)
PMV BLD AUTO: 9.9 FL (ref 8.9–12.7)
POTASSIUM SERPL-SCNC: 3.8 MMOL/L (ref 3.5–5.3)
PROT SERPL-MCNC: 7.6 G/DL (ref 6.4–8.2)
RBC # BLD AUTO: 4.88 MILLION/UL (ref 3.81–5.12)
SODIUM SERPL-SCNC: 140 MMOL/L (ref 136–145)
TRIGL SERPL-MCNC: 118 MG/DL
TSH SERPL DL<=0.05 MIU/L-ACNC: 0.04 UIU/ML (ref 0.36–3.74)
WBC # BLD AUTO: 6.54 THOUSAND/UL (ref 4.31–10.16)

## 2020-09-11 PROCEDURE — 84443 ASSAY THYROID STIM HORMONE: CPT

## 2020-09-11 PROCEDURE — 83036 HEMOGLOBIN GLYCOSYLATED A1C: CPT

## 2020-09-11 PROCEDURE — 80061 LIPID PANEL: CPT

## 2020-09-11 PROCEDURE — 85025 COMPLETE CBC W/AUTO DIFF WBC: CPT

## 2020-09-11 PROCEDURE — 36415 COLL VENOUS BLD VENIPUNCTURE: CPT

## 2020-09-11 PROCEDURE — 80053 COMPREHEN METABOLIC PANEL: CPT

## 2020-09-23 ENCOUNTER — OFFICE VISIT (OUTPATIENT)
Dept: INTERNAL MEDICINE CLINIC | Facility: CLINIC | Age: 60
End: 2020-09-23
Payer: COMMERCIAL

## 2020-09-23 VITALS
WEIGHT: 171.6 LBS | HEART RATE: 85 BPM | BODY MASS INDEX: 28.59 KG/M2 | HEIGHT: 65 IN | DIASTOLIC BLOOD PRESSURE: 76 MMHG | TEMPERATURE: 98.4 F | OXYGEN SATURATION: 96 % | SYSTOLIC BLOOD PRESSURE: 120 MMHG

## 2020-09-23 DIAGNOSIS — E03.9 ACQUIRED HYPOTHYROIDISM: ICD-10-CM

## 2020-09-23 DIAGNOSIS — E66.3 OVERWEIGHT: ICD-10-CM

## 2020-09-23 DIAGNOSIS — I10 ESSENTIAL HYPERTENSION: Primary | ICD-10-CM

## 2020-09-23 DIAGNOSIS — F17.210 CIGARETTE NICOTINE DEPENDENCE WITHOUT COMPLICATION: ICD-10-CM

## 2020-09-23 DIAGNOSIS — G89.29 CHRONIC BILATERAL LOW BACK PAIN WITHOUT SCIATICA: ICD-10-CM

## 2020-09-23 DIAGNOSIS — F33.41 RECURRENT MAJOR DEPRESSIVE DISORDER, IN PARTIAL REMISSION (HCC): ICD-10-CM

## 2020-09-23 DIAGNOSIS — E78.2 MIXED HYPERLIPIDEMIA: ICD-10-CM

## 2020-09-23 DIAGNOSIS — G89.29 CHRONIC PAIN OF RIGHT KNEE: ICD-10-CM

## 2020-09-23 DIAGNOSIS — I73.9 PERIPHERAL VASCULAR DISEASE (HCC): ICD-10-CM

## 2020-09-23 DIAGNOSIS — M54.50 CHRONIC BILATERAL LOW BACK PAIN WITHOUT SCIATICA: ICD-10-CM

## 2020-09-23 DIAGNOSIS — R73.9 HYPERGLYCEMIA: ICD-10-CM

## 2020-09-23 DIAGNOSIS — M25.561 CHRONIC PAIN OF RIGHT KNEE: ICD-10-CM

## 2020-09-23 DIAGNOSIS — Z23 ENCOUNTER FOR IMMUNIZATION: ICD-10-CM

## 2020-09-23 PROCEDURE — 90471 IMMUNIZATION ADMIN: CPT | Performed by: INTERNAL MEDICINE

## 2020-09-23 PROCEDURE — 4004F PT TOBACCO SCREEN RCVD TLK: CPT | Performed by: INTERNAL MEDICINE

## 2020-09-23 PROCEDURE — 3074F SYST BP LT 130 MM HG: CPT | Performed by: INTERNAL MEDICINE

## 2020-09-23 PROCEDURE — 90682 RIV4 VACC RECOMBINANT DNA IM: CPT | Performed by: INTERNAL MEDICINE

## 2020-09-23 PROCEDURE — 99214 OFFICE O/P EST MOD 30 MIN: CPT | Performed by: INTERNAL MEDICINE

## 2020-09-23 PROCEDURE — 3078F DIAST BP <80 MM HG: CPT | Performed by: INTERNAL MEDICINE

## 2020-09-23 RX ORDER — GABAPENTIN 300 MG/1
300 CAPSULE ORAL 2 TIMES DAILY
Qty: 180 CAPSULE | Refills: 1
Start: 2020-09-23 | End: 2021-02-23

## 2020-09-23 RX ORDER — LEVOTHYROXINE SODIUM 0.15 MG/1
150 TABLET ORAL DAILY
Qty: 90 TABLET | Refills: 1 | Status: SHIPPED | OUTPATIENT
Start: 2020-09-23 | End: 2021-01-20

## 2020-09-23 NOTE — PROGRESS NOTES
INTERNAL MEDICINE OFFICE VISIT  [facility] [address] [place] [address]  Tel: [phone]      NAME: Haider Garza  AGE: 61 y o  SEX: female  : 1960   MRN: [id number]    DATE: 2020  TIME: 1:33 PM      Assessment and Plan:  1  Essential hypertension   continue present medication    2  Mixed hyperlipidemia   continue atorvastatin  - CBC and differential; Future  - Comprehensive metabolic panel; Future  - Lipid panel; Future  - TSH, 3rd generation; Future    3  Acquired hypothyroidism   the dose of the levothyroxine was decreased as the TSH was on the lower side and she complains of increased sweating  - levothyroxine 150 mcg tablet; Take 1 tablet (150 mcg total) by mouth daily  Dispense: 90 tablet; Refill: 1    4  Hyperglycemia   she was advised to cut down on the carbohydrates  - Hemoglobin A1C; Future    5  Recurrent major depressive disorder, in partial remission ([facility] )   continue Cymbalta    6  Chronic pain of right knee    - Ambulatory referral to Orthopedic Surgery; Future    7  Peripheral vascular disease ([facility] )    - Ambulatory referral to Vascular Surgery; Future    8  Chronic bilateral low back pain without sciatica    - gabapentin (NEURONTIN) 300 mg capsule; Take 1 capsule (300 mg total) by mouth 2 (two) times a day  Dispense: 180 capsule; Refill: 1    9  Cigarette nicotine dependence without complication   she was counseled to quit smoking    10  Overweight  BMI Counseling: Body mass index is 28 56 kg/m²  The BMI is above normal  Nutrition recommendations include decreasing portion sizes, encouraging healthy choices of fruits and vegetables and moderation in carbohydrate intake  Exercise recommendations include moderate physical activity 150 minutes/week  No pharmacotherapy was ordered  Tobacco Cessation Counseling: Tobacco cessation counseling was provided   The patient is sincerely urged to quit consumption of tobacco  She is not ready to quit tobacco        11  Encounter for immunization    - influenza vaccine, quadrivalent, recombinant, PF, 0 5 mL, for patients 18 yr+ (FLUBLOK)      - Counseling Documentation: patient was counseled regarding: diagnostic results, instructions for management, risk factor reductions, prognosis, patient and family education, risks and benefits of treatment options and importance of compliance with treatment  - Medication Side Effects: Adverse side effects of medications were reviewed with the patient/guardian today  Return for follow up visit in  6 months or earlier, if needed  Chief Complaint:  Chief Complaint   Patient presents with    Follow-up     with labs     Knee Pain     pain in right knee and lower back, patient stated that this has been going  n  for the last couple of months  patient stated that she is waking up with pain  in th e middle of the night in her hands also          History of Present Illness:    hypertension- blood pressure stable on present medication  Hyperlipidemia -takes atorvastatin regularly  Hypothyroidism - her TSH was abnormal and the dose was adjusted  She complains of a lot of sweating since the last few months  Hyperglycemia -stable  Depression-  She has been taking the Cymbalta and is stable  Knee pain and back pain- she was told to follow-up with orthopedics as she is having pain in her right knee which is very disabling and the back pain as well   Peripheral vascular disease - in 2017 she had a vascular ultrasound done by the cardiologist and was told to follow-up but she did not follow-up    Now she was told to see the vascular surgeon   Current smoker -continues to smoke despite multiple blockages in her legs   Overweight -she was told to try to lose weight      Active Problem List:  Patient Active Problem List   Diagnosis    Acquired hypothyroidism    Essential hypertension    [facility]    Mixed hyperlipidemia    Gastroesophageal reflux disease without esophagitis    Opioid dependence ([facility])    Hyperglycemia    Overweight    Cigarette nicotine dependence without complication    Chronic pain of right knee    Chronic bilateral low back pain without sciatica    Peripheral vascular disease ([facility])         Past Medical History:  Past Medical History:   Diagnosis Date    Abnormal ECG     Denial     Heart aneurysm     Heart murmur     Hypertension     Hypothyroidism     PAD (peripheral artery disease) ([facility])          Past Surgical History:  Past Surgical History:   Procedure Laterality Date     SECTION           Family History:  Family History   Problem Relation Age of Onset    Coronary artery disease Father     Other Father         Denial    Other Mother         Denial    Celiac disease Son     Celiac disease Son          Social History:  Social History     Socioeconomic History    Marital status:      Spouse name: None    Number of children: None    Years of education: None    Highest education level: None   Occupational History    None   Social Needs    Financial resource strain: None    Food insecurity     Worry: None     Inability: None    Transportation needs     Medical: None     Non-medical: None   Tobacco Use    Smoking status: Current Every Day Smoker     Packs/day: 1 00     Years: 20 00     Pack years: 20 00     Types: Cigarettes    Smokeless tobacco: Never Used   Substance and Sexual Activity    Alcohol use: No    Drug use: No    Sexual activity: Yes     Partners: Male   Lifestyle    Physical activity     Days per week: 0 days     Minutes per session: 0 min    Stress: Very much   Relationships    Social connections     Talks on phone: None     Gets together: None     Attends Yazidism service: None     Active member of club or organization: None     Attends meetings of clubs or organizations: None     Relationship status: None    Intimate partner violence     Fear of current or ex partner: None     Emotionally abused: None     Physically abused: None     Forced sexual activity: None   Other Topics Concern    None   Social History Narrative    None         Allergies:   Allergies   Allergen Reactions    Penicillins Itching         Medications:    Current Outpatient Medications:     albuterol (VENTOLIN HFA) 90 mcg/act inhaler, Inhale 2 puffs every 6 (six) hours as needed for wheezing, Disp: 1 Inhaler, Rfl: 3    aspirin 81 MG tablet, Take 1 tablet by mouth daily, Disp: , Rfl:     atorvastatin (LIPITOR) 20 mg tablet, TAKE 1 TABLET BY MOUTH EVERY DAY, Disp: 90 tablet, Rfl: 2    cloNIDine (CATAPRES) 0 2 mg tablet, Take 0 2 mg by mouth 2 (two) times a day, Disp: , Rfl: 2    DULoxetine (CYMBALTA) 60 mg delayed release capsule, TAKE 1 CAPSULE BY MOUTH EVERY DAY, Disp: 30 capsule, Rfl: 2    guaiFENesin (MUCINEX) 600 mg 12 hr tablet, Take 600 mg by mouth every 12 (twelve) hours, Disp: , Rfl:     hydrochlorothiazide (HYDRODIURIL) 12 5 mg tablet, TAKE 1 TABLET BY MOUTH EVERY DAY, Disp: 90 tablet, Rfl: 1    ibuprofen (MOTRIN) 600 mg tablet, Take 800 mg by mouth as needed for mild pain , Disp: , Rfl:     levothyroxine 150 mcg tablet, Take 1 tablet (150 mcg total) by mouth daily, Disp: 90 tablet, Rfl: 1    lisinopril (ZESTRIL) 5 mg tablet, TAKE 1 TABLET BY MOUTH EVERY DAY, Disp: 90 tablet, Rfl: 0    SUBOXONE 8-2 MG, USE 2 & 1/2 FILMS UNDER TONGUE DAILY, Disp: , Rfl: 0    gabapentin (NEURONTIN) 300 mg capsule, Take 1 capsule (300 mg total) by mouth 2 (two) times a day, Disp: 180 capsule, Rfl: 1      The following portions of the patient's history were reviewed and updated as appropriate: past medical history, past surgical history, family history, social history, allergies, current medications and active problem list       Review of Systems:  Constitutional: Denies fever, chills, weight gain, weight loss, fatigue  Eyes: Denies eye redness, eye discharge, double vision, change in visual acuity  ENT: Denies hearing loss, tinnitus, sneezing, nasal congestion, nasal discharge, sore throat   Respiratory: Denies cough, expectoration, hemoptysis, shortness of breath, wheezing  Cardiovascular: Denies chest pain, palpitations, lower extremity swelling, orthopnea, PND  Gastrointestinal: Denies abdominal pain, heartburn, nausea, vomiting, hematemesis, diarrhea, bloody stools  Genito-Urinary: Denies dysuria, frequency, difficulty in micturition, nocturia, incontinence  Musculoskeletal:  has back pain, right knee joint pain, muscle pain  Neurologic: Denies confusion, lightheadedness, syncope, headache, focal weakness, sensory changes, seizures  Endocrine: Denies polyuria, polydipsia, temperature intolerance  Allergy and Immunology: Denies hives, insect bite sensitivity  Hematological and Lymphatic: Denies bleeding problems, swollen glands   Psychological: Denies depression, suicidal ideation, anxiety, panic, mood swings  Dermatological: Denies pruritus, rash, skin lesion changes      Vitals:  Vitals:    09/23/20 1250   BP: 120/76   Pulse: 85   Temp: 98 4 °F (36 9 °C)   SpO2: 96%       Body mass index is 28 56 kg/m²  Weight (last 2 days)     Date/Time   Weight    09/23/20 1250   77 8 (171 6)                Physical Examination:  General: Patient is not in acute distress  Awake, alert, responding to commands  No weight gain or loss  Head: Normocephalic  Atraumatic  Eyes: Conjunctiva and lids with no swelling, erythema or discharge  Both pupils normal sized, round and reactive to light  Sclera nonicteric  ENT: External examination of nose and ear normal  Otoscopic examination shows translucent tympanic membranes with patent canals without erythema  Oropharynx moist with no erythema, edema, exudate or lesions  Neck: Supple  JVP not raised  Trachea midline  No masses  No thyromegaly  Lungs: No signs of increased work of breathing or respiratory distress   Bilateral bronchovascular breath sounds with no crackles or rhonchi  Chest wall: No tenderness  Cardiovascular: Normal PMI  No thrills  Regular rate and rhythm  S1 and S2 normal  No murmur, rub or gallop  Gastrointestinal: Abdomen soft, nontender  No guarding or rigidity  Liver and spleen not palpable  Bowel sounds present  Neurologic: Cranial nerves II-XII intact  Cortical functions normal  Motor system - Reflexes 2+ and symmetrical  Sensations normal  Musculoskeletal: Gait normal   Right knee joint tenderness  Integumentary: Skin normal with no rash or lesions  Lymphatic: No palpable lymph nodes in neck, axilla or groin  Extremities: No clubbing, cyanosis, edema or varicosities  Psychological: Judgement and insight normal  Mood and affect normal      Laboratory Results:  CBC with diff:   Lab Results   Component Value Date    WBC 6 54 09/11/2020    RBC 4 88 09/11/2020    HGB 15 2 09/11/2020    HCT 45 6 09/11/2020    MCV 93 09/11/2020    MCH 31 1 09/11/2020    RDW 13 1 09/11/2020     09/11/2020       CMP:  Lab Results   Component Value Date    CREATININE 0 58 (L) 09/11/2020    BUN 10 09/11/2020    K 3 8 09/11/2020     09/11/2020    CO2 29 09/11/2020    ALKPHOS 63 09/11/2020    ALT 42 09/11/2020    AST 35 09/11/2020       Lab Results   Component Value Date    HGBA1C 5 6 09/11/2020       No results found for: TROPONINI, CKMB, CKTOTAL    Lipid Profile:   No results found for: CHOL  Lab Results   Component Value Date    HDL 49 09/11/2020    HDL 49 11/26/2018     Lab Results   Component Value Date    LDLCALC 30 09/11/2020    LDLCALC 42 11/26/2018     Lab Results   Component Value Date    TRIG 118 09/11/2020    TRIG 104 11/26/2018       Imaging Results:  VAS abdominal aorta/iliacs; complete study     [facility] REPORT  CLINICAL:  Indications:  Patient presents for screening secondary to strong family history of aneurysms  Patient is asymptomatic at this time  Risk Factors  The patient has history of hypertension    She has no history of obesity  Clinical  Right Pressure:  134/80 mm Hg  FINDINGS:     Unilateral       Impression  PSV (cm/s)  EDV (cm/s)  AP (cm)  TRV (cm)    Sup-Roxann Ao                          121          22      1 7              Jux Renal Aorta                                                    1 9    Px Inf-Kt Ao                                            1 8       1 8    Ds Inf-Kt Ao                                            1 8              Celiac           >70%               232          63                       Prox  SMA                           195          33      0 8                 Right       Impression  PSV (cm/s)  EDV (cm/s)  AP (cm)   RAR    Prox AP                       118          11      1 1          Dist AP                       134          11      1 0          Prox  EIA                      132          25                   Dist EIA    50-75%             208          26      1 0          Prox Renal                     170          37           1 41       Left        PSV (cm/s)  EDV (cm/s)  AP (cm)    Prox AP           125           0      1 3    Dist AP           121          12      1 1    Prox  EIA          139          14             Dist EIA           160          13      1 0    Prox Renal         114          31                      CONCLUSION:  Impression  The abdominal aorta is patent and normal in caliber  The celiac artery displays a 70% or greater stenosis  The right distal external iliac artery displays a 50-75% stenosis  Bilateral common and external iliac arteries are patent and normal in caliber  The superior mesenteric, and bilateral proximal renal arteries are patent       SIGNATURE:  Electronically Signed by: Jay Toth MD, [address] on 2017-06-12 01:22:47 PM       Health Maintenance:  Health Maintenance   Topic Date Due    Hepatitis C Screening  1960    Pneumococcal Vaccine: Pediatrics (0 to 5 Years) and At-Risk Patients (6 to 59 Years) (1 of 1 - PPSV23) 01/14/1966    HIV Screening  01/14/1975    Annual Physical  01/14/1978    DTaP,Tdap,and Td Vaccines (1 - Tdap) 01/14/1981    MAMMOGRAM  08/18/2015    Cervical Cancer Screening  05/22/2020    Influenza Vaccine  07/01/2020    BMI: Followup Plan  07/11/2020    BMI: Adult  09/23/2021    Colorectal Cancer Screening  06/30/2027    HIB Vaccine  Aged Out    Hepatitis B Vaccine  Aged Out    IPV Vaccine  Aged Out    Hepatitis A Vaccine  Aged Out    Meningococcal ACWY Vaccine  Aged Out    HPV Vaccine  Aged Out     Immunization History   Administered Date(s) Administered    INFLUENZA 11/16/2018    Influenza TIV (IM) 1960    Influenza, recombinant, quadrivalent,injectable, preservative free 11/16/2018, 09/23/2020    Pneumococcal Polysaccharide PPV23 1960    Tdap 1960    Zoster 1960         Hitesh Butt MD  9/23/2020,1:33 PM

## 2020-10-26 ENCOUNTER — CONSULT (OUTPATIENT)
Dept: VASCULAR SURGERY | Facility: CLINIC | Age: 60
End: 2020-10-26
Payer: COMMERCIAL

## 2020-10-26 VITALS
SYSTOLIC BLOOD PRESSURE: 146 MMHG | TEMPERATURE: 97.3 F | HEIGHT: 65 IN | DIASTOLIC BLOOD PRESSURE: 86 MMHG | WEIGHT: 173 LBS | HEART RATE: 70 BPM | BODY MASS INDEX: 28.82 KG/M2

## 2020-10-26 DIAGNOSIS — I73.9 PERIPHERAL VASCULAR DISEASE (HCC): Primary | ICD-10-CM

## 2020-10-26 PROCEDURE — 3008F BODY MASS INDEX DOCD: CPT | Performed by: SURGERY

## 2020-10-26 PROCEDURE — 3079F DIAST BP 80-89 MM HG: CPT | Performed by: SURGERY

## 2020-10-26 PROCEDURE — 99244 OFF/OP CNSLTJ NEW/EST MOD 40: CPT | Performed by: SURGERY

## 2020-10-26 PROCEDURE — 3077F SYST BP >= 140 MM HG: CPT | Performed by: SURGERY

## 2020-12-12 DIAGNOSIS — F33.41 RECURRENT MAJOR DEPRESSIVE DISORDER, IN PARTIAL REMISSION (HCC): ICD-10-CM

## 2020-12-14 RX ORDER — DULOXETIN HYDROCHLORIDE 60 MG/1
CAPSULE, DELAYED RELEASE ORAL
Qty: 30 CAPSULE | Refills: 2 | Status: SHIPPED | OUTPATIENT
Start: 2020-12-14 | End: 2021-04-13

## 2021-01-11 DIAGNOSIS — I10 ESSENTIAL HYPERTENSION: ICD-10-CM

## 2021-01-11 RX ORDER — HYDROCHLOROTHIAZIDE 12.5 MG/1
TABLET ORAL
Qty: 90 TABLET | Refills: 1 | Status: SHIPPED | OUTPATIENT
Start: 2021-01-11 | End: 2021-07-18

## 2021-01-20 DIAGNOSIS — E03.9 ACQUIRED HYPOTHYROIDISM: ICD-10-CM

## 2021-01-20 RX ORDER — LEVOTHYROXINE SODIUM 175 UG/1
TABLET ORAL
Qty: 90 TABLET | Refills: 3 | Status: SHIPPED | OUTPATIENT
Start: 2021-01-20 | End: 2021-02-23

## 2021-01-20 RX ORDER — LEVOTHYROXINE SODIUM 0.15 MG/1
TABLET ORAL
Qty: 90 TABLET | Refills: 1 | Status: SHIPPED | OUTPATIENT
Start: 2021-01-20 | End: 2021-07-22

## 2021-01-30 DIAGNOSIS — I10 ESSENTIAL HYPERTENSION: ICD-10-CM

## 2021-02-01 RX ORDER — LISINOPRIL 5 MG/1
TABLET ORAL
Qty: 90 TABLET | Refills: 0 | Status: SHIPPED | OUTPATIENT
Start: 2021-02-01 | End: 2021-07-10

## 2021-02-23 ENCOUNTER — OFFICE VISIT (OUTPATIENT)
Dept: CARDIOLOGY CLINIC | Facility: CLINIC | Age: 61
End: 2021-02-23
Payer: COMMERCIAL

## 2021-02-23 VITALS
SYSTOLIC BLOOD PRESSURE: 132 MMHG | HEIGHT: 65 IN | DIASTOLIC BLOOD PRESSURE: 80 MMHG | BODY MASS INDEX: 29.49 KG/M2 | WEIGHT: 177 LBS | HEART RATE: 83 BPM | OXYGEN SATURATION: 95 %

## 2021-02-23 DIAGNOSIS — I73.9 PAD (PERIPHERAL ARTERY DISEASE) (HCC): ICD-10-CM

## 2021-02-23 DIAGNOSIS — I25.10 CORONARY ARTERY DISEASE INVOLVING NATIVE CORONARY ARTERY OF NATIVE HEART WITHOUT ANGINA PECTORIS: ICD-10-CM

## 2021-02-23 DIAGNOSIS — Z72.0 TOBACCO ABUSE: ICD-10-CM

## 2021-02-23 DIAGNOSIS — I10 ESSENTIAL HYPERTENSION: ICD-10-CM

## 2021-02-23 DIAGNOSIS — R06.02 SHORTNESS OF BREATH ON EXERTION: Primary | ICD-10-CM

## 2021-02-23 DIAGNOSIS — E78.2 MIXED HYPERLIPIDEMIA: ICD-10-CM

## 2021-02-23 PROCEDURE — 99244 OFF/OP CNSLTJ NEW/EST MOD 40: CPT | Performed by: INTERNAL MEDICINE

## 2021-02-23 PROCEDURE — 4004F PT TOBACCO SCREEN RCVD TLK: CPT | Performed by: INTERNAL MEDICINE

## 2021-02-23 PROCEDURE — 3075F SYST BP GE 130 - 139MM HG: CPT | Performed by: INTERNAL MEDICINE

## 2021-02-23 PROCEDURE — 3079F DIAST BP 80-89 MM HG: CPT | Performed by: INTERNAL MEDICINE

## 2021-02-23 PROCEDURE — 3008F BODY MASS INDEX DOCD: CPT | Performed by: INTERNAL MEDICINE

## 2021-02-23 RX ORDER — IBUPROFEN 800 MG/1
800 TABLET ORAL 3 TIMES DAILY PRN
COMMUNITY
Start: 2021-02-08 | End: 2021-06-28

## 2021-02-23 RX ORDER — METHOCARBAMOL 750 MG/1
TABLET, FILM COATED ORAL
COMMUNITY
Start: 2021-02-08

## 2021-02-23 RX ORDER — GABAPENTIN 400 MG/1
400 CAPSULE ORAL 2 TIMES DAILY
COMMUNITY
Start: 2021-02-09

## 2021-02-23 NOTE — PROGRESS NOTES
Cardiology Consultation   [facility]  [address], [address], [address] [facility], [address]  Tel: [phone]      Na Armas  [id number]  1960  [address] [place] [address]    1  Shortness of breath on exertion  Echo complete with contrast if indicated    NM myocardial perfusion spect (rx stress and/or rest)   2  Coronary artery disease involving native coronary artery of native heart without angina pectoris  NM myocardial perfusion spect (rx stress and/or rest)   3  Essential hypertension     4  Mixed hyperlipidemia     5  Tobacco abuse     6  PAD (peripheral artery disease) ([facility])         History of Present Illness:   Patient comes to establish  She is not the best of historians  C/o SOB on exertion and tiredness  Denies chest pain, palpitations, lightheadedness, syncope, swelling feet, orthopnea, PND  CAD -  States she had a test (?cardiac catheterization) about 5- 6 years ago and was told that "she had blockages in her heart arteries but not bad enough to be needed to be fixed"  Does not remember which hospital this test was done at or which physician did it  HTN -  Has been hypertensive for many years  Taking medications regularly  Denies lightheadedness, headache, medication side effects  HLP -  Has had hyperlipidemia for many years  Taking statin regularly along with diet control  Denies myalgia  PCP closely monitoring the blood work      PAD - Being worked up by vascular surgeon     Patient continues to smoke 1PPD      Past Medical History:  Past Medical History:   Diagnosis Date    Abnormal ECG     Denial     Heart aneurysm     Heart murmur     Hypertension     Hypothyroidism     PAD (peripheral artery disease) ([facility] [address] )          Past Surgical History:  Past Surgical History:   Procedure Laterality Date     SECTION           Family History:  Family History   Problem Relation Age of Onset    Coronary artery disease Father     Other Father         Denial    Other Mother         Denial    Celiac disease Son     Celiac disease Son          Social History:  Social History     Socioeconomic History    Marital status:      Spouse name: None    Number of children: None    Years of education: None    Highest education level: None   Occupational History    None   Social Needs    Financial resource strain: None    Food insecurity     Worry: None     Inability: None    Transportation needs     Medical: None     Non-medical: None   Tobacco Use    Smoking status: Current Every Day Smoker     Packs/day: 1 00     Years: 20 00     Pack years: 20 00     Types: Cigarettes    Smokeless tobacco: Never Used   Substance and Sexual Activity    Alcohol use: No    Drug use: No    Sexual activity: Yes     Partners: Male   Lifestyle    Physical activity     Days per week: 0 days     Minutes per session: 0 min    Stress: Very much   Relationships    Social connections     Talks on phone: None     Gets together: None     Attends Faith service: None     Active member of club or organization: None     Attends meetings of clubs or organizations: None     Relationship status: None    Intimate partner violence     Fear of current or ex partner: None     Emotionally abused: None     Physically abused: None     Forced sexual activity: None   Other Topics Concern    None   Social History Narrative    None         Active Problems:  Patient Active Problem List   Diagnosis    Acquired hypothyroidism    Essential hypertension    Recurrent major depressive disorder, in partial remission ([facility] [address] )    Mixed hyperlipidemia    Gastroesophageal reflux disease without esophagitis    Opioid dependence ([facility] [address] )    Hyperglycemia    Overweight    Cigarette nicotine dependence without complication    Chronic pain of right knee    Chronic bilateral low back pain without sciatica    Peripheral vascular disease ([address] )         The following portions of the patient's history were reviewed and updated as appropriate: past medical history, past surgical history, past family history,  past social history, current medications, allergies and problem list       Review of Systems:  Constitutional: Denies fever, chills  Eyes: Denies eye redness, eye discharge  ENT: Denies hearing loss, sneezing, nasal discharge, sore throat   Respiratory: Denies cough, expectoration  +shortness of breath  Cardiovascular: Denies chest pain, palpitations, orthopnea  Gastrointestinal: Denies abdominal pain, nausea, vomiting, bloody stools  Genito-Urinary: Denies dysuria, incontinence  Musculoskeletal: +back pain  Neurologic: Denies lightheadedness, syncope, headache, seizures  Endocrine: Denies polydipsia, temperature intolerance  Allergy and Immunology: Denies hives, insect bite sensitivity  Hematological and Lymphatic: Denies bleeding problems, swollen glands   Psychological: Denies suicidal ideation, panic  Dermatological: Denies pruritus, rash, skin lesion changes      Vitals:  Vitals:    02/23/21 1050   BP: 132/80   Pulse: 83   SpO2: 95%       Body mass index is 29 45 kg/m²  Weight (last 2 days)     Date/Time   Weight    02/23/21 1050   80 3 (177)                Physical Examination:  General: Patient is not in acute distress  Awake, alert, oriented in time, place and person  Responding to commands  Head: Normocephalic  Atraumatic  Eyes: Both pupils normal sized, round and reactive to light  Nonicteric  ENT: Normal external ear canals  Neck: Supple  JVP not raised  Trachea central  No thyromegaly  Lungs: Bilateral bronchovascular breath sounds with no crackles or rhonchi  Chest wall: No tenderness  Cardiovascular: RRR   S1 and S2 normal  No murmur, rub or gallop  Gastrointestinal: Abdomen soft, nontender  No guarding or rigidity  Liver and spleen not palpable  Bowel sounds present  Neurologic: Patient is awake, alert, oriented in time, place and person  Responding to command  Moving all extremities  Integumentary:  No skin rash  Lymphatic: No cervical lymphadenopathy  Back: Symmetric   No CVA tenderness  Extremities: No clubbing, cyanosis or edema      Laboratory Results:  CBC with diff:   Lab Results   Component Value Date    WBC 6 54 2020    RBC 4 88 2020    HGB 15 2 2020    HCT 45 6 2020    MCV 93 2020    MCH 31 1 2020    RDW 13 1 2020     2020       CMP:  Lab Results   Component Value Date    CREATININE 0 58 (L) 2020    BUN 10 2020    K 3 8 2020     2020    CO2 29 2020    ALKPHOS 63 2020    ALT 42 2020    AST 35 2020       Lab Results   Component Value Date    HGBA1C 5 6 2020       Lipid Profile:   No results found for: CHOL  Lab Results   Component Value Date    HDL 49 2020    HDL 49 2018    HDL 53 2018     Lab Results   Component Value Date    LDLCALC 30 2020    LDLCALC 42 2018    LDLCALC 32 2018     Lab Results   Component Value Date    TRIG 118 2020    TRIG 104 2018    TRIG 60 2018       Cardiac testing:   Results for orders placed during the hospital encounter of 17   Echo complete with contrast if indicated    Narrative [address]  [address]  [phone]    Transthoracic Echocardiogram  2D, M-mode, Doppler, and Color Doppler    Study date:  2017    Patient: Shen Pimentel  MR number: [id number]  Account number: [de-identified]  : 1960  Age: 62 years  Gender: Female  Status: Outpatient  Location: [address] [place] [address] [facility]  Height: 65 in  Weight: 156 lb  BP: 128/ 76 mmHg    Indications: Abnormal EKG    Diagnoses: R07 9 - Chest pain, unspecified, R94 31 - Abnormal electrocardiogram [ECG] [EKG]    Sonographer:  ED Alvarado  Primary Physician:  Adelia Murguia MD  Referring Physician:  Sosa Nicholas MD  Group:  [facility]  Interpreting Physician:  Jeff Méndez DO    SUMMARY    SUMMARY:  Limited study, pt refused apical images  Limited Doppler  LEFT VENTRICLE:  Systolic function was normal  Ejection fraction was estimated in the range of 60 % to 65 %  Although no diagnostic regional wall motion abnormality was identified, this possibility cannot be completely excluded on the basis of this study  TRICUSPID VALVE:  There was mild regurgitation  HISTORY: PRIOR HISTORY: Hypertension; Tobacco abuse; Murmur; Chest pain; Family history of aneurysm    PROCEDURE: The study was performed in the [address]  This was a routine study  The transthoracic approach was used  The study included complete 2D imaging, M-mode, limited spectral Doppler, and color Doppler  The  heart rate was 62 bpm, at the start of the study  Images were obtained from the parasternal, apical, subcostal, and suprasternal notch acoustic windows  This was a technically difficult study  LEFT VENTRICLE: Size was normal  Systolic function was normal  Ejection fraction was estimated in the range of 60 % to 65 %  Although no diagnostic regional wall motion abnormality was identified, this possibility cannot be completely  excluded on the basis of this study  Wall thickness was normal  DOPPLER: The study was not technically sufficient to allow evaluation of LV diastolic function  RIGHT VENTRICLE: The size was normal  Systolic function was normal  Wall thickness was normal     RIGHT ATRIUM: There was a prominent Chiari network  MITRAL VALVE: Not well visualized  AORTIC VALVE: The valve was trileaflet  Leaflets exhibited normal thickness and normal cuspal separation      TRICUSPID VALVE: The valve structure was normal  There was normal leaflet separation  DOPPLER: The transtricuspid velocity was within the normal range  There was no evidence for stenosis  There was mild regurgitation  PULMONIC VALVE: Not well visualized  PERICARDIUM: There was no pericardial effusion  The pericardium was normal in appearance  AORTA: The root exhibited normal size  SYSTEMIC VEINS: IVC: The inferior vena cava was normal in size and course   Respirophasic changes were normal     SYSTEM MEASUREMENT TABLES    2D  %FS: 37 98 %  Ao Diam: 2 81 cm  EDV(Teich): 74 78 ml  EF(Cube): 76 15 %  EF(Teich): 68 62 %  ESV(Cube): 16 6 ml  ESV(Teich): 23 46 ml  IVSd: 1 14 cm  LA Diam: 3 11 cm  LVIDd: 4 11 cm  LVIDs: 2 55 cm  LVPWd: 1 02 cm  SV(Cube): 52 98 ml  SV(Teich): 51 31 ml    CW  TR Vmax: 1 87 m/s  TR maxP 02 mmHg    PW  E': 0 07 m/s  E/E': 8 05  MV A Riccardo: 0 59 m/s  MV Dec [place]: 2 48 m/s2  MV DecT: 214 93 ms  MV E Riccardo: 0 53 m/s  MV E/A Ratio: 0 9    [facility]    Prepared and electronically signed by    Daisha Cool DO  Signed 2017 12:45:23         Results for orders placed during the hospital encounter of 17   NM myocardial perfusion spect (stress and/or rest)    Narrative [address]  [address] [place] [address]  [phone]    Rest/Stress Gated SPECT Myocardial Perfusion Imaging After Exercise    Patient: Sergey Shields  MR number: [id number]  Account number: [de-identified]  : 1960  Age: 62 years  Gender: Female  Status: Outpatient  Location: [address] [place] [address] [facility]  Height: 65 in  Weight: 156 lb  BP: 124/ 70 mmHg    Allergies: PENICILLINS    Diagnosis: R94 31 - Abnormal electrocardiogram [ECG] [EKG]    Interpreting Physician:  Daisha Cool DO  Referring Physician:  Jeff Collins MD  Primary Physician:  Agnieszka Shah MD  Technician:  MARYBETH Culp  RN:  Lilian Carrel, RN  Group:    [facility]  Cardiology Fellow:  Zuleyka Huff MD  Report Prepared by[de-identified]  Bishop Villalta RN    INDICATIONS: Detection of coronary artery disease  HISTORY: The patient is a 62year old  female  Chest pain status: recent onset chest pain  Coronary artery disease risk factors: hypertension, smoking, family history of premature coronary artery disease, and post-menopausal  state  Cardiovascular history: ascending aortic aneurysm    PHYSICAL EXAM: Baseline physical exam screening: normal     REST ECG: Normal sinus rhythm  Nonspecific ST and T wave abnormalities were present  PROCEDURE: The study was performed at the [address]  Treadmill exercise testing was performed, using the Honey protocol  Gated SPECT myocardial perfusion imaging was performed after stress  Systolic blood pressure  was 124 mmHg, at the start of the study  Diastolic blood pressure was 70 mmHg, at the start of the study  The heart rate was 63 bpm, at the start of the study  IV double checked  HONEY PROTOCOL:  HR bpm SBP mmHg DBP mmHg Symptoms  Baseline 63 124 70 none  Stage 1 88 134 74 none  Stage 2 104 160 74 none  Stage 3 121 166 80 moderate fatigue  Recovery 1 81 158 80 none  Recovery 2 75 124 68 none    STRESS SUMMARY: Duration of exercise was 9 min  The patient exercised to protocol stage 3  Maximal work rate was 10 METs  Maximal heart rate during stress was 121 bpm ( 74 % of maximal predicted heart rate)  The heart rate response to  stress was normal  There was normal resting blood pressure with an appropriate response to stress  The rate-pressure product for the peak heart rate and blood pressure was 54186  There was no chest pain during stress  The stress test was  terminated due to moderate fatigue  Pre oxygen saturation: 98 %  Peak oxygen saturation: 98 %  The stress ECG was negative for ischemia  There were no stress arrhythmias or conduction abnormalities      ISOTOPE ADMINISTRATION:  Resting isotope administration Stress isotope administration  Agent Tetrofosmin Tetrofosmin  Dose 10 19 mCi 31 9 mCi  Date 06/09/2017 06/09/2017  Injection time 09:55 11:40  Imaging time 10:42 12:28  Injection-image interval 47 min 48 min    The radiopharmaceutical was injected one minute before the end of exercise  MYOCARDIAL PERFUSION IMAGING:  The image quality was good  Rotating projection images reveal mild breast attenuation and breast implants  Left ventricular size was normal  The TID ratio was 1 02  PERFUSION DEFECTS:  -  There were no perfusion defects  GATED SPECT:  The calculated left ventricular ejection fraction was 75 %  Left ventricular ejection fraction was within normal limits by visual estimate  There was no left ventricular regional abnormality  SUMMARY:  -  Stress results: Duration of exercise was 9 min  Maximal work rate was 10 METs  Maximal heart rate during stress was 121 bpm ( 74 % of maximal predicted heart rate)  Target heart rate was not achieved  There was no chest pain during  stress  -  ECG conclusions: The stress ECG was negative for ischemia  -  Perfusion imaging: There were no perfusion defects   -  Gated SPECT: The calculated left ventricular ejection fraction was 75 %  Left ventricular ejection fraction was within normal limits by visual estimate  There was no left ventricular regional abnormality  IMPRESSIONS: Normal study after submaximal exercise with no chest pain  Target HR was not achieved but patient did exercise for 9 minutes (10 METS) Myocardial perfusion imaging was normal at rest and with stress  Left ventricular systolic  function was normal  Diagnostic sensitivity was limited by submaximal stress      Prepared and signed by    Meena Dia DO  Signed 06/09/2017 16:34:58         Medications:    Current Outpatient Medications:     albuterol (VENTOLIN HFA) 90 mcg/act inhaler, Inhale 2 puffs every 6 (six) hours as needed for wheezing, Disp: 1 Inhaler, Rfl: 3    aspirin 81 MG tablet, Take 1 tablet by mouth daily, Disp: , Rfl:     atorvastatin (LIPITOR) 20 mg tablet, TAKE 1 TABLET BY MOUTH EVERY DAY, Disp: 90 tablet, Rfl: 2    cloNIDine (CATAPRES) 0 2 mg tablet, Take 0 2 mg by mouth 2 (two) times a day, Disp: , Rfl: 2    DULoxetine (CYMBALTA) 60 mg delayed release capsule, TAKE 1 CAPSULE BY MOUTH EVERY DAY, Disp: 30 capsule, Rfl: 2    gabapentin (NEURONTIN) 400 mg capsule, Take 400 mg by mouth 2 (two) times a day, Disp: , Rfl:     hydrochlorothiazide (HYDRODIURIL) 12 5 mg tablet, TAKE 1 TABLET BY MOUTH EVERY DAY, Disp: 90 tablet, Rfl: 1    ibuprofen (MOTRIN) 800 mg tablet, Take 800 mg by mouth 3 (three) times a day as needed, Disp: , Rfl:     levothyroxine 150 mcg tablet, TAKE 1 TABLET BY MOUTH EVERY DAY, Disp: 90 tablet, Rfl: 1    lisinopril (ZESTRIL) 5 mg tablet, TAKE 1 TABLET BY MOUTH EVERY DAY, Disp: 90 tablet, Rfl: 0    SUBOXONE 8-2 MG, USE 2 & 1/2 FILMS UNDER TONGUE DAILY, Disp: , Rfl: 0    guaiFENesin (MUCINEX) 600 mg 12 hr tablet, Take 600 mg by mouth every 12 (twelve) hours, Disp: , Rfl:     ibuprofen (MOTRIN) 600 mg tablet, Take 800 mg by mouth as needed for mild pain , Disp: , Rfl:     methocarbamol (ROBAXIN) 750 mg tablet, TAKE 1 TABLET BY MOUTH FOUR TIMES A DAY AS NEEDED, Disp: , Rfl:       Allergies: Allergies   Allergen Reactions    Penicillins Itching         Assessment and Plan:  1  Shortness of breath on exertion   check 2D echocardiogram for EF, RWMA   check pharmacological nuclear stress test to rule out underlying significant CAD  Patient could not reach target heart rate on her past exercise stress test  So a pharmacological nuclear stress test is being ordered    2  Coronary artery disease involving native coronary artery of native heart without angina pectoris   exact details unavailable  Pharmacological nuclear stress test ordered for evaluation   continue aspirin, statin, ACE-inhibitor    If needed, beta-blocker will be added    3  Essential hypertension   BP stable  Continue current medications    4  Mixed hyperlipidemia   continue statin and diet control  Her PCP closely monitor the blood work    5  Tobacco abuse   strongly counseled to stop smoking    6  PAD (peripheral artery disease) (HCC)   on aspirin, statin  Following with vascular surgeon    Recommend aggressive risk factor modification and therapeutic lifestyle changes  Low-salt, low-calorie, low-fat, low-cholesterol diet with regular exercise and to optimize weight  I will defer the ordering and monitoring of necessity lab studies to you, but I am available and happy to review and manage any of the data at your request in the future  Discussed concepts of atherosclerosis, including signs and symptoms of cardiac disease  Previous studies were reviewed  Safety measures were reviewed  Questions were entertained and answered  Patient was advised to report any problems requiring medical attention  Follow-up with PCP and appropriate specialist and lab work as discussed  Return for follow up visit as scheduled or earlier, if needed  Thank you for allowing me to participate in the care and evaluation of your patient  Should you have any questions, please feel free to contact me        Taryn Hopper MD  2/23/2021,12:18 PM

## 2021-03-12 ENCOUNTER — APPOINTMENT (OUTPATIENT)
Dept: NON INVASIVE DIAGNOSTICS | Facility: CLINIC | Age: 61
End: 2021-03-12
Payer: COMMERCIAL

## 2021-03-12 ENCOUNTER — HOSPITAL ENCOUNTER (OUTPATIENT)
Dept: NON INVASIVE DIAGNOSTICS | Facility: CLINIC | Age: 61
Discharge: HOME | End: 2021-03-12
Payer: COMMERCIAL

## 2021-03-12 DIAGNOSIS — R06.02 SHORTNESS OF BREATH ON EXERTION: ICD-10-CM

## 2021-03-12 PROCEDURE — 93308 TTE F-UP OR LMTD: CPT

## 2021-03-13 PROCEDURE — 93325 DOPPLER ECHO COLOR FLOW MAPG: CPT | Performed by: INTERNAL MEDICINE

## 2021-03-13 PROCEDURE — 93308 TTE F-UP OR LMTD: CPT | Performed by: INTERNAL MEDICINE

## 2021-03-13 PROCEDURE — 93321 DOPPLER ECHO F-UP/LMTD STD: CPT | Performed by: INTERNAL MEDICINE

## 2021-03-16 ENCOUNTER — TELEPHONE (OUTPATIENT)
Dept: CARDIOLOGY CLINIC | Facility: CLINIC | Age: 61
End: 2021-03-16

## 2021-03-16 NOTE — TELEPHONE ENCOUNTER
----- Message from Navneet Connell PA-C sent at 3/15/2021  4:31 PM EDT -----  pls call echo was good

## 2021-03-22 ENCOUNTER — HOSPITAL ENCOUNTER (OUTPATIENT)
Dept: NON INVASIVE DIAGNOSTICS | Facility: CLINIC | Age: 61
Discharge: HOME | End: 2021-03-22
Payer: COMMERCIAL

## 2021-03-22 DIAGNOSIS — I25.10 CORONARY ARTERY DISEASE INVOLVING NATIVE CORONARY ARTERY OF NATIVE HEART WITHOUT ANGINA PECTORIS: ICD-10-CM

## 2021-03-22 DIAGNOSIS — R06.02 SHORTNESS OF BREATH ON EXERTION: ICD-10-CM

## 2021-03-22 PROCEDURE — A9502 TC99M TETROFOSMIN: HCPCS

## 2021-03-22 PROCEDURE — G1004 CDSM NDSC: HCPCS

## 2021-03-22 PROCEDURE — 93016 CV STRESS TEST SUPVJ ONLY: CPT | Performed by: INTERNAL MEDICINE

## 2021-03-22 PROCEDURE — 78452 HT MUSCLE IMAGE SPECT MULT: CPT | Performed by: INTERNAL MEDICINE

## 2021-03-22 PROCEDURE — 78452 HT MUSCLE IMAGE SPECT MULT: CPT

## 2021-03-22 PROCEDURE — 93018 CV STRESS TEST I&R ONLY: CPT | Performed by: INTERNAL MEDICINE

## 2021-03-22 PROCEDURE — 93017 CV STRESS TEST TRACING ONLY: CPT

## 2021-03-22 RX ADMIN — REGADENOSON 0.4 MG: 0.08 INJECTION, SOLUTION INTRAVENOUS at 09:14

## 2021-03-25 ENCOUNTER — TELEPHONE (OUTPATIENT)
Dept: INTERNAL MEDICINE CLINIC | Facility: CLINIC | Age: 61
End: 2021-03-25

## 2021-03-25 DIAGNOSIS — Z79.899 ENCOUNTER FOR MONITORING SUBOXONE MAINTENANCE THERAPY: Primary | ICD-10-CM

## 2021-03-25 DIAGNOSIS — F11.20 UNCOMPLICATED OPIOID DEPENDENCE (HCC): Primary | ICD-10-CM

## 2021-03-25 DIAGNOSIS — Z51.81 ENCOUNTER FOR MONITORING SUBOXONE MAINTENANCE THERAPY: Primary | ICD-10-CM

## 2021-03-25 NOTE — TELEPHONE ENCOUNTER
Not finding the doctor in the system   Please have Casper or Reji Galdamez write it on a script if urgent, otherwise keep a reminder on my desk for Monday

## 2021-03-25 NOTE — TELEPHONE ENCOUNTER
Pt has an appt to see Dr Janet Phelps to refill her suboxone  She needs a doctors referral for insurance purposes  Diagnosis would be for opioid dependence       Fax referral to [phone]

## 2021-03-29 LAB
MAX DIASTOLIC BP: 70 MMHG
MAX HEART RATE: 93 BPM
MAX PREDICTED HEART RATE: 159 BPM
MAX. SYSTOLIC BP: 156 MMHG
PROTOCOL NAME: NORMAL
REASON FOR TERMINATION: NORMAL
TARGET HR FORMULA: NORMAL
TIME IN EXERCISE PHASE: NORMAL

## 2021-03-31 ENCOUNTER — TELEMEDICINE (OUTPATIENT)
Dept: INTERNAL MEDICINE CLINIC | Facility: CLINIC | Age: 61
End: 2021-03-31
Payer: COMMERCIAL

## 2021-03-31 DIAGNOSIS — J32.9 SINUSITIS, UNSPECIFIED CHRONICITY, UNSPECIFIED LOCATION: ICD-10-CM

## 2021-03-31 DIAGNOSIS — I73.9 PERIPHERAL VASCULAR DISEASE (HCC): ICD-10-CM

## 2021-03-31 DIAGNOSIS — E78.2 MIXED HYPERLIPIDEMIA: ICD-10-CM

## 2021-03-31 DIAGNOSIS — I10 ESSENTIAL HYPERTENSION: ICD-10-CM

## 2021-03-31 DIAGNOSIS — H92.01 EARACHE ON RIGHT: Primary | ICD-10-CM

## 2021-03-31 DIAGNOSIS — E03.9 ACQUIRED HYPOTHYROIDISM: ICD-10-CM

## 2021-03-31 PROCEDURE — G2012 BRIEF CHECK IN BY MD/QHP: HCPCS | Performed by: PHYSICIAN ASSISTANT

## 2021-03-31 RX ORDER — AZITHROMYCIN 250 MG/1
TABLET, FILM COATED ORAL
Qty: 6 TABLET | Refills: 0 | Status: SHIPPED | OUTPATIENT
Start: 2021-03-31 | End: 2021-04-05

## 2021-03-31 NOTE — PROGRESS NOTES
Virtual Brief Visit    Assessment/Plan: right-sided earache and right-sided facial pain yellow drainage from nose will treat with Zithromax and Sudafed return in person next week if her symptoms do not resolve    Problem List Items Addressed This Visit        Endocrine    Acquired hypothyroidism       Cardiovascular and Mediastinum    Essential hypertension    Peripheral vascular disease ([address] )       Other    Mixed hyperlipidemia      Other Visit Diagnoses     Earache on right    -  Primary    Relevant Medications    azithromycin (ZITHROMAX) 250 mg tablet    Sinusitis, unspecified chronicity, unspecified location        Relevant Medications    azithromycin (ZITHROMAX) 250 mg tablet                Reason for visit is   Chief Complaint   Patient presents with    Virtual Brief Visit        Encounter provider Jolanta Thapa PA-C    Provider located at [facility] [place] [address]    Recent Visits  Date Type Provider Dept   03/25/21 Telephone Micah Mohamud 7 recent visits within past 7 days and meeting all other requirements     Today's Visits  Date Type Provider Dept   03/31/21 Micah Kee 7 today's visits and meeting all other requirements     Future Appointments  No visits were found meeting these conditions  Showing future appointments within next 150 days and meeting all other requirements        After connecting through telephone, the patient was identified by name and date of birth  Suma Mckeon was informed that this is a telemedicine visit and that the visit is being conducted through telephone  My office door was closed  No one else was in the room  She acknowledged consent and understanding of privacy and security of the platform   The patient has agreed to participate and understands she can discontinue the visit at any time     Patient is aware this is a billable service  Subjective    Westley Noel is a 64 y o  female   3 day history of stuffy nose and right-sided earache throbbing at times her hearing is preserved also having some pressure behind her right eye and some yellow drainage coming from her nose  Occasional cough no shortness of breath fever chills back pain aching nausea vomiting diarrhea  Recent chest pain echo and nuclear stress test unremarkable  Her chest pain problems have resolved    A smoker       Past Medical History:   Diagnosis Date    Abnormal ECG     Denial     Heart aneurysm     Heart murmur     Hypertension     Hypothyroidism     [facility]AD [facility]peripheral artery disease[facility] ([facility])        Past Surgical History:   Procedure Laterality Date     SECTION         Current Outpatient Medications   Medication Sig Dispense Refill    albuterol (VENTOLIN HFA) 90 mcg/act inhaler Inhale 2 puffs every 6 (six) hours as needed for wheezing 1 Inhaler 3    aspirin 81 MG tablet Take 1 tablet by mouth daily      atorvastatin (LIPITOR) 20 mg tablet TAKE 1 TABLET BY MOUTH EVERY DAY 90 tablet 2    azithromycin (ZITHROMAX) 250 mg tablet Take 2 tablets day 1 then  1 a day for 4 days 6 tablet 0    cloNIDine (CATAPRES) 0 2 mg tablet Take 0 2 mg by mouth 2 (two) times a day  2    DULoxetine (CYMBALTA) 60 mg delayed release capsule TAKE 1 CAPSULE BY MOUTH EVERY DAY 30 capsule 2    gabapentin (NEURONTIN) 400 mg capsule Take 400 mg by mouth 2 (two) times a day      guaiFENesin (MUCINEX) 600 mg 12 hr tablet Take 600 mg by mouth every 12 (twelve) hours      hydrochlorothiazide (HYDRODIURIL) 12 5 mg tablet TAKE 1 TABLET BY MOUTH EVERY DAY 90 tablet 1    ibuprofen (MOTRIN) 600 mg tablet Take 800 mg by mouth as needed for mild pain       ibuprofen (MOTRIN) 800 mg tablet Take 800 mg by mouth 3 (three) times a day as needed      levothyroxine 150 mcg tablet TAKE 1 TABLET BY MOUTH EVERY DAY 90 tablet 1    lisinopril (ZESTRIL) 5 mg tablet TAKE 1 TABLET BY MOUTH EVERY DAY 90 tablet 0    methocarbamol (ROBAXIN) 750 mg tablet TAKE 1 TABLET BY MOUTH FOUR TIMES A DAY AS NEEDED      SUBOXONE 8-2 MG USE 2 & 1/2 FILMS UNDER TONGUE DAILY  0     No current facility-administered medications for this visit  Allergies   Allergen Reactions    Penicillins Itching       Review of Systems    There were no vitals filed for this visit  I spent  10 minutes directly with the patient during this visit    VIRTUAL VISIT DISCLAIMER    Jerardo Nunn acknowledges that she has consented to an online visit or consultation  She understands that the online visit is based solely on information provided by her, and that, in the absence of a face-to-face physical evaluation by the physician, the diagnosis she receives is both limited and provisional in terms of accuracy and completeness  This is not intended to replace a full medical face-to-face evaluation by the physician  Jerardo Nunn understands and accepts these terms

## 2021-04-13 DIAGNOSIS — F33.41 RECURRENT MAJOR DEPRESSIVE DISORDER, IN PARTIAL REMISSION (HCC): ICD-10-CM

## 2021-04-13 RX ORDER — DULOXETIN HYDROCHLORIDE 60 MG/1
CAPSULE, DELAYED RELEASE ORAL
Qty: 30 CAPSULE | Refills: 2 | Status: SHIPPED | OUTPATIENT
Start: 2021-04-13 | End: 2021-07-18

## 2021-05-12 ENCOUNTER — IMMUNIZATIONS (OUTPATIENT)
Dept: FAMILY MEDICINE CLINIC | Facility: HOSPITAL | Age: 61
End: 2021-05-12

## 2021-05-12 DIAGNOSIS — Z23 ENCOUNTER FOR IMMUNIZATION: Primary | ICD-10-CM

## 2021-05-12 PROCEDURE — 91300 SARS-COV-2 / COVID-19 MRNA VACCINE (PFIZER-BIONTECH) 30 MCG: CPT

## 2021-05-12 PROCEDURE — 0001A SARS-COV-2 / COVID-19 MRNA VACCINE (PFIZER-BIONTECH) 30 MCG: CPT

## 2021-06-03 ENCOUNTER — IMMUNIZATIONS (OUTPATIENT)
Dept: FAMILY MEDICINE CLINIC | Facility: HOSPITAL | Age: 61
End: 2021-06-03

## 2021-06-03 DIAGNOSIS — Z23 ENCOUNTER FOR IMMUNIZATION: Primary | ICD-10-CM

## 2021-06-03 PROCEDURE — 91300 SARS-COV-2 / COVID-19 MRNA VACCINE (PFIZER-BIONTECH) 30 MCG: CPT

## 2021-06-03 PROCEDURE — 0002A SARS-COV-2 / COVID-19 MRNA VACCINE (PFIZER-BIONTECH) 30 MCG: CPT

## 2021-06-28 ENCOUNTER — TELEPHONE (OUTPATIENT)
Dept: INTERNAL MEDICINE CLINIC | Facility: CLINIC | Age: 61
End: 2021-06-28

## 2021-06-28 ENCOUNTER — APPOINTMENT (OUTPATIENT)
Dept: LAB | Facility: CLINIC | Age: 61
End: 2021-06-28
Payer: COMMERCIAL

## 2021-06-28 ENCOUNTER — OFFICE VISIT (OUTPATIENT)
Dept: INTERNAL MEDICINE CLINIC | Facility: CLINIC | Age: 61
End: 2021-06-28
Payer: COMMERCIAL

## 2021-06-28 VITALS
DIASTOLIC BLOOD PRESSURE: 80 MMHG | HEART RATE: 78 BPM | WEIGHT: 175.4 LBS | SYSTOLIC BLOOD PRESSURE: 120 MMHG | OXYGEN SATURATION: 97 % | TEMPERATURE: 98.5 F | BODY MASS INDEX: 29.22 KG/M2 | HEIGHT: 65 IN

## 2021-06-28 DIAGNOSIS — R73.9 HYPERGLYCEMIA: ICD-10-CM

## 2021-06-28 DIAGNOSIS — E66.3 OVERWEIGHT: ICD-10-CM

## 2021-06-28 DIAGNOSIS — E87.6 HYPOKALEMIA: Primary | ICD-10-CM

## 2021-06-28 DIAGNOSIS — K21.9 GASTROESOPHAGEAL REFLUX DISEASE WITHOUT ESOPHAGITIS: Primary | ICD-10-CM

## 2021-06-28 DIAGNOSIS — E78.2 MIXED HYPERLIPIDEMIA: ICD-10-CM

## 2021-06-28 DIAGNOSIS — Z11.4 ENCOUNTER FOR SCREENING FOR HIV: ICD-10-CM

## 2021-06-28 DIAGNOSIS — H66.92 OTITIS OF LEFT EAR: ICD-10-CM

## 2021-06-28 DIAGNOSIS — F33.41 RECURRENT MAJOR DEPRESSIVE DISORDER, IN PARTIAL REMISSION (HCC): ICD-10-CM

## 2021-06-28 DIAGNOSIS — E03.9 ACQUIRED HYPOTHYROIDISM: ICD-10-CM

## 2021-06-28 DIAGNOSIS — Z11.59 NEED FOR HEPATITIS C SCREENING TEST: ICD-10-CM

## 2021-06-28 DIAGNOSIS — I10 ESSENTIAL HYPERTENSION: ICD-10-CM

## 2021-06-28 DIAGNOSIS — R06.02 SOB (SHORTNESS OF BREATH): ICD-10-CM

## 2021-06-28 DIAGNOSIS — F17.210 CIGARETTE NICOTINE DEPENDENCE WITHOUT COMPLICATION: ICD-10-CM

## 2021-06-28 DIAGNOSIS — Z12.31 ENCOUNTER FOR SCREENING MAMMOGRAM FOR BREAST CANCER: ICD-10-CM

## 2021-06-28 LAB
ALBUMIN SERPL BCP-MCNC: 3.6 G/DL (ref 3.5–5)
ALP SERPL-CCNC: 60 U/L (ref 46–116)
ALT SERPL W P-5'-P-CCNC: 51 U/L (ref 12–78)
ANION GAP SERPL CALCULATED.3IONS-SCNC: 1 MMOL/L (ref 4–13)
AST SERPL W P-5'-P-CCNC: 43 U/L (ref 5–45)
BASOPHILS # BLD AUTO: 0.03 THOUSANDS/ΜL (ref 0–0.1)
BASOPHILS NFR BLD AUTO: 0 % (ref 0–1)
BILIRUB SERPL-MCNC: 0.7 MG/DL (ref 0.2–1)
BUN SERPL-MCNC: 9 MG/DL (ref 5–25)
CALCIUM SERPL-MCNC: 8.8 MG/DL (ref 8.3–10.1)
CHLORIDE SERPL-SCNC: 102 MMOL/L (ref 100–108)
CHOLEST SERPL-MCNC: 95 MG/DL (ref 50–200)
CO2 SERPL-SCNC: 35 MMOL/L (ref 21–32)
CREAT SERPL-MCNC: 0.54 MG/DL (ref 0.6–1.3)
EOSINOPHIL # BLD AUTO: 0.09 THOUSAND/ΜL (ref 0–0.61)
EOSINOPHIL NFR BLD AUTO: 1 % (ref 0–6)
ERYTHROCYTE [DISTWIDTH] IN BLOOD BY AUTOMATED COUNT: 13 % (ref 11.6–15.1)
EST. AVERAGE GLUCOSE BLD GHB EST-MCNC: 117 MG/DL
GFR SERPL CREATININE-BSD FRML MDRD: 102 ML/MIN/1.73SQ M
GLUCOSE P FAST SERPL-MCNC: 100 MG/DL (ref 65–99)
HBA1C MFR BLD: 5.7 %
HCT VFR BLD AUTO: 45 % (ref 34.8–46.1)
HDLC SERPL-MCNC: 46 MG/DL
HGB BLD-MCNC: 15.3 G/DL (ref 11.5–15.4)
IMM GRANULOCYTES # BLD AUTO: 0.01 THOUSAND/UL (ref 0–0.2)
IMM GRANULOCYTES NFR BLD AUTO: 0 % (ref 0–2)
LDLC SERPL CALC-MCNC: 32 MG/DL (ref 0–100)
LYMPHOCYTES # BLD AUTO: 2.5 THOUSANDS/ΜL (ref 0.6–4.47)
LYMPHOCYTES NFR BLD AUTO: 34 % (ref 14–44)
MCH RBC QN AUTO: 31.5 PG (ref 26.8–34.3)
MCHC RBC AUTO-ENTMCNC: 34 G/DL (ref 31.4–37.4)
MCV RBC AUTO: 93 FL (ref 82–98)
MONOCYTES # BLD AUTO: 0.77 THOUSAND/ΜL (ref 0.17–1.22)
MONOCYTES NFR BLD AUTO: 10 % (ref 4–12)
NEUTROPHILS # BLD AUTO: 4.02 THOUSANDS/ΜL (ref 1.85–7.62)
NEUTS SEG NFR BLD AUTO: 55 % (ref 43–75)
NONHDLC SERPL-MCNC: 49 MG/DL
NRBC BLD AUTO-RTO: 0 /100 WBCS
PLATELET # BLD AUTO: 249 THOUSANDS/UL (ref 149–390)
PMV BLD AUTO: 10.1 FL (ref 8.9–12.7)
POTASSIUM SERPL-SCNC: 3.1 MMOL/L (ref 3.5–5.3)
PROT SERPL-MCNC: 7.5 G/DL (ref 6.4–8.2)
RBC # BLD AUTO: 4.86 MILLION/UL (ref 3.81–5.12)
SODIUM SERPL-SCNC: 138 MMOL/L (ref 136–145)
TRIGL SERPL-MCNC: 86 MG/DL
TSH SERPL DL<=0.05 MIU/L-ACNC: 0.05 UIU/ML (ref 0.36–3.74)
WBC # BLD AUTO: 7.42 THOUSAND/UL (ref 4.31–10.16)

## 2021-06-28 PROCEDURE — 85025 COMPLETE CBC W/AUTO DIFF WBC: CPT

## 2021-06-28 PROCEDURE — 99214 OFFICE O/P EST MOD 30 MIN: CPT | Performed by: INTERNAL MEDICINE

## 2021-06-28 PROCEDURE — 3079F DIAST BP 80-89 MM HG: CPT | Performed by: INTERNAL MEDICINE

## 2021-06-28 PROCEDURE — 3074F SYST BP LT 130 MM HG: CPT | Performed by: INTERNAL MEDICINE

## 2021-06-28 PROCEDURE — 84443 ASSAY THYROID STIM HORMONE: CPT

## 2021-06-28 PROCEDURE — 86803 HEPATITIS C AB TEST: CPT

## 2021-06-28 PROCEDURE — 4004F PT TOBACCO SCREEN RCVD TLK: CPT | Performed by: INTERNAL MEDICINE

## 2021-06-28 PROCEDURE — 80053 COMPREHEN METABOLIC PANEL: CPT

## 2021-06-28 PROCEDURE — 80061 LIPID PANEL: CPT

## 2021-06-28 PROCEDURE — 3008F BODY MASS INDEX DOCD: CPT | Performed by: INTERNAL MEDICINE

## 2021-06-28 PROCEDURE — 83036 HEMOGLOBIN GLYCOSYLATED A1C: CPT

## 2021-06-28 PROCEDURE — 36415 COLL VENOUS BLD VENIPUNCTURE: CPT

## 2021-06-28 PROCEDURE — 87389 HIV-1 AG W/HIV-1&-2 AB AG IA: CPT

## 2021-06-28 RX ORDER — FAMOTIDINE 40 MG/1
40 TABLET, FILM COATED ORAL DAILY
COMMUNITY
Start: 2021-06-20 | End: 2021-09-20 | Stop reason: ALTCHOICE

## 2021-06-28 RX ORDER — PSEUDOEPHEDRINE HYDROCHLORIDE 30 MG/1
60 TABLET ORAL EVERY 4 HOURS PRN
COMMUNITY

## 2021-06-28 RX ORDER — OMEPRAZOLE 20 MG/1
CAPSULE, DELAYED RELEASE ORAL
COMMUNITY
Start: 2021-06-20 | End: 2021-08-05

## 2021-06-28 RX ORDER — POTASSIUM CHLORIDE 20 MEQ/1
20 TABLET, EXTENDED RELEASE ORAL DAILY
Qty: 30 TABLET | Refills: 5 | Status: SHIPPED | OUTPATIENT
Start: 2021-06-28 | End: 2021-12-30

## 2021-06-28 RX ORDER — ALBUTEROL SULFATE 90 UG/1
2 AEROSOL, METERED RESPIRATORY (INHALATION) EVERY 6 HOURS PRN
Qty: 18 G | Refills: 3 | Status: SHIPPED | OUTPATIENT
Start: 2021-06-28 | End: 2022-01-27

## 2021-06-28 RX ORDER — AZITHROMYCIN 250 MG/1
TABLET, FILM COATED ORAL
Qty: 6 TABLET | Refills: 0 | Status: SHIPPED | OUTPATIENT
Start: 2021-06-28 | End: 2021-07-03

## 2021-06-28 NOTE — TELEPHONE ENCOUNTER
----- Message from Pieter Samuel MD sent at 6/28/2021  5:15 PM EDT -----   Potassium is low, please  the potassium supplement from the pharmacy which was sent  Take 1 tablet daily  Repeat blood work in 1 month      The dose of the thyroid medicine cannot be increased

## 2021-06-28 NOTE — PROGRESS NOTES
INTERNAL MEDICINE OFFICE VISIT  [facility] AT [address]  Tel: [phone]      NAME: Ilsa Hairston  AGE: 64 y o  SEX: female  : 1960   MRN: [id number]    DATE: 2021  TIME: 12:55 PM      Assessment and Plan:  1  Gastroesophageal reflux disease without esophagitis  Patient was started on the omeprazole and famotidine by the ER physician but needs to see GI for further workup  - Ambulatory referral to Gastroenterology; Future    2  Essential hypertension    Continue present medication    3  Mixed hyperlipidemia   continue atorvastatin    4  Hyperglycemia   was told to cut down on the carbohydrates    5  Acquired hypothyroidism   continue levothyroxine at the same dose    6  Recurrent major depressive disorder, in partial remission (HCC)   continue Cymbalta    7  SOB (shortness of breath)    - albuterol (Ventolin HFA) 90 mcg/act inhaler; Inhale 2 puffs every 6 (six) hours as needed for wheezing  Dispense: 18 g; Refill: 3  - Complete PFT with post bronchodilator; Future    8  Cigarette nicotine dependence without complication   was counseled to quit smoking    9  Overweight  BMI Counseling: Body mass index is 29 19 kg/m²  The BMI is above normal  Nutrition recommendations include decreasing portion sizes, encouraging healthy choices of fruits and vegetables and moderation in carbohydrate intake  Exercise recommendations include moderate physical activity 150 minutes/week  No pharmacotherapy was ordered  Tobacco Cessation Counseling: Tobacco cessation counseling was provided  The patient is sincerely urged to quit consumption of tobacco  She is not ready to quit tobacco        10  Encounter for screening mammogram for breast cancer    - Mammo screening bilateral w 3d & cad; Future    11  Encounter for screening for HIV    - HIV 1/2 Antigen/Antibody (4th Generation) w Reflex [facility]; Future    12   Need for hepatitis C screening test    - Hepatitis C antibody; Future    13  Otitis of left ear    - azithromycin (ZITHROMAX) 250 mg tablet; Take 2 tablets today then 1 tablet daily x 4 days  Dispense: 6 tablet; Refill: 0      - Counseling Documentation: patient was counseled regarding: diagnostic results, instructions for management, risk factor reductions, prognosis, patient and family education, risks and benefits of treatment options and importance of compliance with treatment  - Medication Side Effects: Adverse side effects of medications were reviewed with the patient/guardian today  Return for follow up visit in  4 months or earlier, if needed  Chief Complaint:  Chief Complaint   Patient presents with   Sujata Boning     for about a week and she needs a referral to specialist          History of Present Illness:    patient was recently in the ER with symptoms of GERD and was started on omeprazole and famotidine  She has been taking the medication but still has symptoms and wants to see GI for a possible endoscopy  She has had symptoms of GERD in the past but this was a very severe attack  Her blood pressure and cholesterol have been under control  TSH is normal and hemoglobin A1c is also normal   She continues to smoke and does have shortness of breath and wheezing for which she was told to use the albuterol inhaler      Depression is under control with the Cymbalta but she has a lot of stress in her life as she is taking care of her 51-year-old dad      Active Problem List:  Patient Active Problem List   Diagnosis    Acquired hypothyroidism    Essential hypertension    Recurrent major depressive disorder, in partial remission ([address] )    Mixed hyperlipidemia    Gastroesophageal reflux disease without esophagitis    Opioid dependence ([facility] [address]    Hyperglycemia    Overweight    Cigarette nicotine dependence without complication    Chronic pain of right knee    Chronic bilateral low back pain without sciatica    Peripheral vascular disease (HCC)    SOB (shortness of breath)         Past Medical History:  Past Medical History:   Diagnosis Date    Abnormal ECG     Denial     Heart aneurysm     Heart murmur     Hypertension     Hypothyroidism     PAD (peripheral artery disease) (HCC)          Past Surgical History:  Past Surgical History:   Procedure Laterality Date     SECTION           Family History:  Family History   Problem Relation Age of Onset    Coronary artery disease Father     Other Father         Denial    Other Mother         Denial    Celiac disease Son     Celiac disease Son          Social History:  Social History     Socioeconomic History    Marital status:      Spouse name: None    Number of children: None    Years of education: None    Highest education level: None   Occupational History    None   Tobacco Use    Smoking status: Current Every Day Smoker     Packs/day: 1 00     Years: 20 00     Pack years: 20 00     Types: Cigarettes    Smokeless tobacco: Never Used   Vaping Use    Vaping Use: Never used   Substance and Sexual Activity    Alcohol use: Yes     Alcohol/week: 2 0 standard drinks     Types: 1 Glasses of wine, 1 Cans of beer per week    Drug use: No    Sexual activity: Yes     Partners: Male   Other Topics Concern    None   Social History Narrative    None     Social Determinants of Health     Financial Resource Strain:     Difficulty of Paying Living Expenses:    Food Insecurity:     Worried About Running Out of Food in the Last Year:     Ran Out of Food in the Last Year:    Transportation Needs:     Lack of Transportation (Medical):      Lack of Transportation (Non-Medical):    Physical Activity: Inactive    Days of Exercise per Week: 0 days    Minutes of Exercise per Session: 0 min   Stress: Stress Concern Present    Feeling of Stress : Very much   Social Connections:     Frequency of Communication with Friends and Family:     Frequency of Social Gatherings with Friends and Family:     Attends Evangelical Services:     Active Member of Clubs or Organizations:     Attends Club or Organization Meetings:     Marital Status:    Intimate Partner Violence:     Fear of Current or Ex-Partner:     Emotionally Abused:     Physically Abused:     Sexually Abused: Allergies:   Allergies   Allergen Reactions    Penicillins Itching         Medications:    Current Outpatient Medications:     albuterol (Ventolin HFA) 90 mcg/act inhaler, Inhale 2 puffs every 6 (six) hours as needed for wheezing, Disp: 18 g, Rfl: 3    aspirin 81 MG tablet, Take 1 tablet by mouth daily, Disp: , Rfl:     atorvastatin (LIPITOR) 20 mg tablet, TAKE 1 TABLET BY MOUTH EVERY DAY, Disp: 90 tablet, Rfl: 2    DULoxetine (CYMBALTA) 60 mg delayed release capsule, TAKE 1 CAPSULE BY MOUTH EVERY DAY, Disp: 30 capsule, Rfl: 2    famotidine (PEPCID) 40 MG tablet, Take 40 mg by mouth daily, Disp: , Rfl:     gabapentin (NEURONTIN) 400 mg capsule, Take 400 mg by mouth 2 (two) times a day, Disp: , Rfl:     hydrochlorothiazide (HYDRODIURIL) 12 5 mg tablet, TAKE 1 TABLET BY MOUTH EVERY DAY, Disp: 90 tablet, Rfl: 1    levothyroxine 150 mcg tablet, TAKE 1 TABLET BY MOUTH EVERY DAY, Disp: 90 tablet, Rfl: 1    lisinopril (ZESTRIL) 5 mg tablet, TAKE 1 TABLET BY MOUTH EVERY DAY, Disp: 90 tablet, Rfl: 0    methocarbamol (ROBAXIN) 750 mg tablet, TAKE 1 TABLET BY MOUTH FOUR TIMES A DAY AS NEEDED, Disp: , Rfl:     omeprazole (PriLOSEC) 20 mg delayed release capsule, omeprazole 20 mg capsule,delayed release, Disp: , Rfl:     pseudoephedrine (SUDAFED) 30 mg tablet, Take 60 mg by mouth every 4 (four) hours as needed for congestion, Disp: , Rfl:     SUBOXONE 8-2 MG, USE 2 & 1/2 FILMS UNDER TONGUE DAILY, Disp: , Rfl: 0    azithromycin (ZITHROMAX) 250 mg tablet, Take 2 tablets today then 1 tablet daily x 4 days, Disp: 6 tablet, Rfl: 0      The following portions of the patient's history were reviewed and updated as appropriate: past medical history, past surgical history, family history, social history, allergies, current medications and active problem list       Review of Systems:  Constitutional: Denies fever, chills, weight gain, weight loss, fatigue  Eyes: Denies eye redness, eye discharge, double vision, change in visual acuity  ENT: Denies hearing loss, tinnitus, sneezing, nasal congestion, nasal discharge, sore throat   Has pain in the left ear  Respiratory: has cough, expectoration, shortness of breath, wheezing  Cardiovascular: Denies chest pain, palpitations, lower extremity swelling, orthopnea, PND  Gastrointestinal: Denies abdominal pain, heartburn, nausea, vomiting, hematemesis, diarrhea, bloody stools  Genito-Urinary: Denies dysuria, frequency, difficulty in micturition, nocturia, incontinence  Musculoskeletal: Denies back pain, joint pain, muscle pain  Neurologic: Denies confusion, lightheadedness, syncope, headache, focal weakness, sensory changes, seizures  Endocrine: Denies polyuria, polydipsia, temperature intolerance  Allergy and Immunology: Denies hives, insect bite sensitivity  Hematological and Lymphatic: Denies bleeding problems, swollen glands   Psychological: has depression,  denies suicidal ideation, anxiety, panic, mood swings  Dermatological: Denies pruritus, rash, skin lesion changes      Vitals:  Vitals:    06/28/21 0836   BP: 120/80   Pulse: 78   Temp: 98 5 °F (36 9 °C)   SpO2: 97%       Body mass index is 29 19 kg/m²  Weight (last 2 days)     Date/Time   Weight    06/28/21 0836   79 6 (175 4)                Physical Examination:  General: Patient is not in acute distress  Awake, alert, responding to commands  No weight gain or loss  Head: Normocephalic  Atraumatic  Eyes: Conjunctiva and lids with no swelling, erythema or discharge  Both pupils normal sized, round and reactive to light   Sclera nonicteric  ENT: External examination of nose and ear normal  Otoscopic examination shows translucent tympanic membranes with patent canals without erythema  Oropharynx with  erythema, edema  Neck: Supple  JVP not raised  Trachea midline  No masses  No thyromegaly  Lungs: No signs of increased work of breathing or respiratory distress  Bilateral bronchovascular breath sounds with no crackles or rhonchi  Chest wall: No tenderness  Cardiovascular: Normal PMI  No thrills  Regular rate and rhythm  S1 and S2 normal  No murmur, rub or gallop  Gastrointestinal: Abdomen soft, nontender  No guarding or rigidity  Liver and spleen not palpable  Bowel sounds present  Neurologic: Cranial nerves II-XII intact   Cortical functions normal  Motor system - Reflexes 2+ and symmetrical  Sensations normal  Musculoskeletal: Gait normal  No joint tenderness  Integumentary: Skin normal with no rash or lesions  Lymphatic: No palpable lymph nodes in neck, axilla or groin  Extremities: No clubbing, cyanosis, edema or varicosities  Psychological: Judgement and insight normal   depressed      Laboratory Results:  CBC with diff:   Lab Results   Component Value Date    WBC 6 54 09/11/2020    RBC 4 88 09/11/2020    HGB 15 2 09/11/2020    HCT 45 6 09/11/2020    MCV 93 09/11/2020    MCH 31 1 09/11/2020    RDW 13 1 09/11/2020     09/11/2020       CMP:  Lab Results   Component Value Date    CREATININE 0 58 (L) 09/11/2020    BUN 10 09/11/2020    K 3 8 09/11/2020     09/11/2020    CO2 29 09/11/2020    ALKPHOS 63 09/11/2020    ALT 42 09/11/2020    AST 35 09/11/2020       Lab Results   Component Value Date    HGBA1C 5 6 09/11/2020       No results found for: TROPONINI, CKMB, CKTOTAL    Lipid Profile:   No results found for: CHOL  Lab Results   Component Value Date    HDL 49 09/11/2020    HDL 49 11/26/2018     Lab Results   Component Value Date    LDLCALC 30 09/11/2020    LDLCALC 42 11/26/2018     Lab Results   Component Value Date    TRIG 118 09/11/2020    TRIG 104 11/26/2018       Imaging Results:  Stress strip  Confirmed by David Calloway ([id number]),  Angeles Garcia ([phone]) on 3/29/2021 2:56:16 PM       Health Maintenance:  Health Maintenance   Topic Date Due    Hepatitis C Screening  Never done    Pneumococcal Vaccine: Pediatrics (0 to 5 Years) and At-Risk Patients (6 to 59 Years) (1 of 2 - PPSV23) 01/14/1966    Depression Remission PHQ  Never done    HIV Screening  Never done    Annual Physical  Never done    DTaP,Tdap,and Td Vaccines (1 - Tdap) 01/14/1981    MAMMOGRAM  08/18/2015    Cervical Cancer Screening  05/22/2020    BMI: Followup Plan  09/23/2021    BMI: Adult  06/28/2022    Colorectal Cancer Screening  06/30/2027    Influenza Vaccine  Completed    COVID-19 Vaccine  Completed    HIB Vaccine  Aged Out    Hepatitis B Vaccine  Aged Out    IPV Vaccine  Aged Out    Hepatitis A Vaccine  Aged Out    Meningococcal ACWY Vaccine  Aged Out    HPV Vaccine  Aged Out     Immunization History   Administered Date(s) Administered    INFLUENZA 11/16/2018    Influenza, recombinant, quadrivalent,injectable, preservative free 11/16/2018, 09/23/2020    Influenza, seasonal, injectable 1960    Pneumococcal Polysaccharide PPV23 1960    SARS-CoV-2 / COVID-19 mRNA IM (Pfizer-BioNTech) 05/12/2021, 06/03/2021    Tdap 1960    Zoster 1960         Tylor Mcgill MD  6/28/2021,12:55 PM

## 2021-06-29 ENCOUNTER — TELEPHONE (OUTPATIENT)
Dept: INTERNAL MEDICINE CLINIC | Facility: CLINIC | Age: 61
End: 2021-06-29

## 2021-06-29 DIAGNOSIS — B18.2 CHRONIC HEPATITIS C WITHOUT HEPATIC COMA (HCC): Primary | ICD-10-CM

## 2021-06-29 LAB — HCV AB SER QL: ABNORMAL

## 2021-06-29 NOTE — TELEPHONE ENCOUNTER
----- Message from Chanelle Ferguson MD sent at 6/29/2021  1:09 PM EDT -----  Hep C antibody is positive, please tell pt to make an appointment to see GI, order is in

## 2021-06-30 LAB — HIV 1+2 AB+HIV1 P24 AG SERPL QL IA: NORMAL

## 2021-07-13 DIAGNOSIS — E78.2 MIXED HYPERLIPIDEMIA: ICD-10-CM

## 2021-07-13 RX ORDER — ATORVASTATIN CALCIUM 20 MG/1
TABLET, FILM COATED ORAL
Qty: 90 TABLET | Refills: 2 | Status: SHIPPED | OUTPATIENT
Start: 2021-07-13 | End: 2022-02-03

## 2021-07-19 DIAGNOSIS — R73.9 HYPERGLYCEMIA: Primary | ICD-10-CM

## 2021-07-19 DIAGNOSIS — I10 ESSENTIAL HYPERTENSION: ICD-10-CM

## 2021-07-19 DIAGNOSIS — F33.41 RECURRENT MAJOR DEPRESSIVE DISORDER, IN PARTIAL REMISSION (HCC): ICD-10-CM

## 2021-07-19 DIAGNOSIS — E78.2 MIXED HYPERLIPIDEMIA: ICD-10-CM

## 2021-07-19 RX ORDER — HYDROCHLOROTHIAZIDE 12.5 MG/1
12.5 TABLET ORAL DAILY
Qty: 90 TABLET | Refills: 2 | Status: SHIPPED | OUTPATIENT
Start: 2021-07-19 | End: 2022-06-27

## 2021-07-19 RX ORDER — DULOXETIN HYDROCHLORIDE 60 MG/1
60 CAPSULE, DELAYED RELEASE ORAL DAILY
Qty: 90 CAPSULE | Refills: 2 | Status: SHIPPED | OUTPATIENT
Start: 2021-07-19 | End: 2022-04-28

## 2021-07-21 DIAGNOSIS — E03.9 ACQUIRED HYPOTHYROIDISM: ICD-10-CM

## 2021-07-22 RX ORDER — LEVOTHYROXINE SODIUM 0.15 MG/1
TABLET ORAL
Qty: 90 TABLET | Refills: 1 | Status: SHIPPED | OUTPATIENT
Start: 2021-07-22 | End: 2022-01-04

## 2021-08-04 RX ORDER — CHLORHEXIDINE GLUCONATE 0.12 MG/ML
RINSE ORAL
COMMUNITY
Start: 2021-07-18 | End: 2021-08-05

## 2021-08-04 RX ORDER — POTASSIUM CHLORIDE 20 MEQ/1
TABLET, EXTENDED RELEASE ORAL
COMMUNITY
End: 2021-10-04

## 2021-08-04 RX ORDER — CLINDAMYCIN HYDROCHLORIDE 300 MG/1
CAPSULE ORAL
COMMUNITY
Start: 2021-07-18 | End: 2021-08-05

## 2021-08-04 RX ORDER — ASPIRIN 81 MG/1
TABLET ORAL
COMMUNITY
End: 2021-09-20 | Stop reason: ALTCHOICE

## 2021-08-05 ENCOUNTER — OFFICE VISIT (OUTPATIENT)
Dept: GASTROENTEROLOGY | Facility: CLINIC | Age: 61
End: 2021-08-05
Payer: COMMERCIAL

## 2021-08-05 VITALS
DIASTOLIC BLOOD PRESSURE: 84 MMHG | WEIGHT: 175.8 LBS | HEIGHT: 65 IN | HEART RATE: 69 BPM | SYSTOLIC BLOOD PRESSURE: 128 MMHG | BODY MASS INDEX: 29.29 KG/M2

## 2021-08-05 DIAGNOSIS — R13.19 ESOPHAGEAL DYSPHAGIA: ICD-10-CM

## 2021-08-05 DIAGNOSIS — Z86.010 HISTORY OF COLON POLYPS: ICD-10-CM

## 2021-08-05 DIAGNOSIS — K21.9 GASTROESOPHAGEAL REFLUX DISEASE WITHOUT ESOPHAGITIS: Primary | ICD-10-CM

## 2021-08-05 PROBLEM — Z86.0100 HISTORY OF COLON POLYPS: Status: ACTIVE | Noted: 2021-08-05

## 2021-08-05 PROCEDURE — 99244 OFF/OP CNSLTJ NEW/EST MOD 40: CPT | Performed by: INTERNAL MEDICINE

## 2021-08-05 PROCEDURE — 3008F BODY MASS INDEX DOCD: CPT | Performed by: INTERNAL MEDICINE

## 2021-08-05 PROCEDURE — 4004F PT TOBACCO SCREEN RCVD TLK: CPT | Performed by: INTERNAL MEDICINE

## 2021-08-05 PROCEDURE — 3074F SYST BP LT 130 MM HG: CPT | Performed by: INTERNAL MEDICINE

## 2021-08-05 PROCEDURE — 3079F DIAST BP 80-89 MM HG: CPT | Performed by: INTERNAL MEDICINE

## 2021-08-05 RX ORDER — IBUPROFEN 800 MG/1
800 TABLET ORAL 3 TIMES DAILY PRN
COMMUNITY
Start: 2021-07-26

## 2021-08-05 RX ORDER — PANTOPRAZOLE SODIUM 40 MG/1
40 TABLET, DELAYED RELEASE ORAL DAILY
Qty: 30 TABLET | Refills: 2 | Status: SHIPPED | OUTPATIENT
Start: 2021-08-05 | End: 2021-11-04

## 2021-08-05 NOTE — PROGRESS NOTES
Consultation - [facility]  Nelda Palomo 1960 64 y o  female     ASSESSMENT @ PLAN:   She is a 43-year-old female with solid food dysphagia hiccups and a dry mouth over the last 6 weeks without overt peptic symptoms  In addition she has a remote history of polyps 15 years ago and had negative exam 5 years a    1 will give Protonix 40 mg daily for 8 weeks    2 will do EGD and colonoscopy to investigate    Chief Complaint:  GERD and history of polyp    HPI:  She is a 43-year-old female with 5 weeks of intermittent solid food dysphagia  She reports hiccups  She reports a dryness when she swallows  She has no heartburn regurgitation  She reports that she has had to regurgitate food twice  She has no melena  No NSAID use no weight loss no nausea or vomiting  She has no pain in her abdomen  She has never been diagnosed formally with reflux before  She went to an urgent care and they gave her a week of omeprazole and famotidine and she reports this did not help her  She had a colonoscopy with polyps 15 years ago and then had a follow-up exam 5 years ago and is due for colon cancer surveillance  REVIEW OF SYSTEMS:     CONSTITUTIONAL: Denies any fever, chills, or rigors  Good appetite, and no recent weight loss  HEENT: No earache or tinnitus  Denies hearing loss or visual disturbances  CARDIOVASCULAR: No chest pain or palpitations  RESPIRATORY: Denies any cough, hemoptysis, shortness of breath or dyspnea on exertion  GASTROINTESTINAL: As noted in the History of Present Illness  GENITOURINARY: No problems with urination  Denies any hematuria or dysuria  NEUROLOGIC: No dizziness or vertigo, denies headaches  MUSCULOSKELETAL: Denies any muscle or joint pain  SKIN: Denies skin rashes or itching  ENDOCRINE: Denies excessive thirst  Denies intolerance to heat or cold  PSYCHOSOCIAL: Denies depression or anxiety  Denies any recent memory loss       Past Medical History: Diagnosis Date    Abnormal ECG     Denial     Heart aneurysm     Heart murmur     Hypertension     Hypothyroidism     [facility]per[facility] artery disease) ([facility])       Past Surgical History:   Procedure Laterality Date     SECTION       Social History     Socioeconomic History    Marital status:      Spouse name: Not on file    Number of children: Not on file    Years of education: Not on file    Highest education level: Not on file   Occupational History    Not on file   Tobacco Use    Smoking status: Current Every Day Smoker     Packs/day: 1 00     Years: 20 00     Pack years: 20 00     Types: Cigarettes    Smokeless tobacco: Never Used   Vaping Use    Vaping Use: Never used   Substance and Sexual Activity    Alcohol use: Yes     Alcohol/week: 2 0 standard drinks     Types: 1 Glasses of wine, 1 Cans of beer per week    Drug use: No    Sexual activity: Yes     Partners: Male   Other Topics Concern    Not on file   Social History Narrative    Not on file     Social Determinants of Health     Financial Resource Strain:     Difficulty of Paying Living Expenses:    Food Insecurity:     Worried About Running Out of Food in the Last Year:     [address] in the Last Year:    Transportation Needs:     Lack of Transportation (Medical):      Lack of Transportation (Non-Medical):    Physical Activity: Inactive    Days of Exercise per Week: 0 days    Minutes of Exercise per Session: 0 min   Stress: Stress Concern Present    Feeling of Stress : Very much   Social Connections:     Frequency of Communication with Friends and Family:     Frequency of Social Gatherings with Friends and Family:     Attends Zoroastrianism Services:     Active Member of Clubs or Organizations:     Attends Club or Organization Meetings:     Marital Status:    Intimate Partner Violence:     Fear of Current or Ex-Partner:     Emotionally Abused:     Physically Abused:     Sexually Abused:      Family History   Problem Relation Age of Onset    Coronary artery disease Father     Other Father         Denial    Other Mother         Denial    Celiac disease Son     Celiac disease Son      Penicillins  Current Outpatient Medications   Medication Sig Dispense Refill    albuterol (Ventolin HFA) 90 mcg/act inhaler Inhale 2 puffs every 6 (six) hours as needed for wheezing 18 g 3    aspirin (ECOTRIN LOW STRENGTH) 81 mg EC tablet aspirin 81 mg tablet,delayed release      aspirin 81 MG tablet Take 1 tablet by mouth daily      atorvastatin (LIPITOR) 20 mg tablet TAKE 1 TABLET BY MOUTH EVERY DAY 90 tablet 2    DULoxetine (CYMBALTA) 60 mg delayed release capsule Take 1 capsule (60 mg total) by mouth daily 90 capsule 2    gabapentin (NEURONTIN) 400 mg capsule Take 400 mg by mouth 2 (two) times a day      hydrochlorothiazide (HYDRODIURIL) 12 5 mg tablet Take 1 tablet (12 5 mg total) by mouth daily 90 tablet 2    ibuprofen (MOTRIN) 800 mg tablet Take 800 mg by mouth 3 (three) times a day as needed      levothyroxine 150 mcg tablet TAKE 1 TABLET BY MOUTH EVERY DAY 90 tablet 1    lisinopril (ZESTRIL) 5 mg tablet TAKE 1 TABLET BY MOUTH EVERY DAY 90 tablet 0    methocarbamol (ROBAXIN) 750 mg tablet TAKE 1 TABLET BY MOUTH FOUR TIMES A DAY AS NEEDED      potassium chloride (K-DUR,KLOR-CON) 20 mEq tablet Take 1 tablet (20 mEq total) by mouth daily 30 tablet 5    potassium chloride (Klor-Con M20) 20 mEq tablet Klor-Con M20 mEq tablet,extended release      pseudoephedrine (SUDAFED) 30 mg tablet Take 60 mg by mouth every 4 (four) hours as needed for congestion      SUBOXONE 8-2 MG USE 2 & 1/2 FILMS UNDER TONGUE DAILY  0    famotidine (PEPCID) 40 MG tablet Take 40 mg by mouth daily      pantoprazole (PROTONIX) 40 mg tablet Take 1 tablet (40 mg total) by mouth daily 30 tablet 2     No current facility-administered medications for this visit         Blood pressure 128/84, pulse 69, height 5' 5" (1 651 m), weight 79 7 kg (175 lb 12 8 oz)  PHYSICAL EXAM:     General Appearance:   Alert, cooperative, no distress, appears stated age    HEENT:   Normocephalic, atraumatic, anicteric      Neck:  Supple, symmetrical, trachea midline, no adenopathy;    thyroid: no enlargement/tenderness/nodules; no carotid  bruit or JVD    Lungs:   Clear to auscultation bilaterally; no rales, rhonchi or wheezing; respirations unlabored    Heart[de-identified]   S1 and S2 normal; regular rate and rhythm; no murmur, rub, or gallop     Abdomen:   Soft, non-tender, non-distended; normal bowel sounds; no masses, no organomegaly    Genitalia:   Deferred    Rectal:   Deferred    Extremities:  No cyanosis, clubbing or edema    Pulses:  2+ and symmetric all extremities    Skin:  Skin color, texture, turgor normal, no rashes or lesions    Lymph nodes:  No palpable cervical, axillary or inguinal lymphadenopathy        Lab Results   Component Value Date    WBC 7 42 06/28/2021    HGB 15 3 06/28/2021    HCT 45 0 06/28/2021    MCV 93 06/28/2021     06/28/2021     Lab Results   Component Value Date    CALCIUM 8 8 06/28/2021    K 3 1 (L) 06/28/2021    CO2 35 (H) 06/28/2021     06/28/2021    BUN 9 06/28/2021    CREATININE 0 54 (L) 06/28/2021     Lab Results   Component Value Date    ALT 51 06/28/2021    AST 43 06/28/2021    ALKPHOS 60 06/28/2021     No results found for: INR, PROTIME

## 2021-08-24 ENCOUNTER — HOSPITAL ENCOUNTER (OUTPATIENT)
Dept: MAMMOGRAPHY | Facility: CLINIC | Age: 61
Discharge: HOME | End: 2021-08-24
Payer: COMMERCIAL

## 2021-08-24 VITALS — HEIGHT: 65 IN | BODY MASS INDEX: 29.16 KG/M2 | WEIGHT: 175 LBS

## 2021-08-24 DIAGNOSIS — Z12.31 ENCOUNTER FOR SCREENING MAMMOGRAM FOR BREAST CANCER: ICD-10-CM

## 2021-08-24 PROCEDURE — 77063 BREAST TOMOSYNTHESIS BI: CPT

## 2021-08-24 PROCEDURE — 77067 SCR MAMMO BI INCL CAD: CPT

## 2021-09-20 ENCOUNTER — OFFICE VISIT (OUTPATIENT)
Dept: INTERNAL MEDICINE CLINIC | Facility: CLINIC | Age: 61
End: 2021-09-20
Payer: COMMERCIAL

## 2021-09-20 ENCOUNTER — APPOINTMENT (OUTPATIENT)
Dept: LAB | Facility: CLINIC | Age: 61
End: 2021-09-20
Payer: COMMERCIAL

## 2021-09-20 ENCOUNTER — TELEPHONE (OUTPATIENT)
Dept: INTERNAL MEDICINE CLINIC | Facility: CLINIC | Age: 61
End: 2021-09-20

## 2021-09-20 VITALS
BODY MASS INDEX: 30.32 KG/M2 | DIASTOLIC BLOOD PRESSURE: 82 MMHG | SYSTOLIC BLOOD PRESSURE: 138 MMHG | TEMPERATURE: 99.6 F | OXYGEN SATURATION: 96 % | HEIGHT: 65 IN | HEART RATE: 77 BPM | WEIGHT: 182 LBS

## 2021-09-20 DIAGNOSIS — E03.9 ACQUIRED HYPOTHYROIDISM: Primary | ICD-10-CM

## 2021-09-20 DIAGNOSIS — E03.9 ACQUIRED HYPOTHYROIDISM: ICD-10-CM

## 2021-09-20 DIAGNOSIS — E78.2 MIXED HYPERLIPIDEMIA: ICD-10-CM

## 2021-09-20 DIAGNOSIS — L98.9 SKIN LESIONS: ICD-10-CM

## 2021-09-20 DIAGNOSIS — B18.2 CHRONIC HEPATITIS C WITHOUT HEPATIC COMA (HCC): ICD-10-CM

## 2021-09-20 DIAGNOSIS — R73.9 HYPERGLYCEMIA: ICD-10-CM

## 2021-09-20 LAB
ALBUMIN SERPL BCP-MCNC: 3.3 G/DL (ref 3.5–5)
ALP SERPL-CCNC: 80 U/L (ref 46–116)
ALT SERPL W P-5'-P-CCNC: 56 U/L (ref 12–78)
ANION GAP SERPL CALCULATED.3IONS-SCNC: 2 MMOL/L (ref 4–13)
AST SERPL W P-5'-P-CCNC: 45 U/L (ref 5–45)
BASOPHILS # BLD AUTO: 0.02 THOUSANDS/ΜL (ref 0–0.1)
BASOPHILS NFR BLD AUTO: 0 % (ref 0–1)
BILIRUB SERPL-MCNC: 0.54 MG/DL (ref 0.2–1)
BUN SERPL-MCNC: 10 MG/DL (ref 5–25)
CALCIUM ALBUM COR SERPL-MCNC: 9.4 MG/DL (ref 8.3–10.1)
CALCIUM SERPL-MCNC: 8.8 MG/DL (ref 8.3–10.1)
CHLORIDE SERPL-SCNC: 108 MMOL/L (ref 100–108)
CHOLEST SERPL-MCNC: 93 MG/DL (ref 50–200)
CO2 SERPL-SCNC: 31 MMOL/L (ref 21–32)
CREAT SERPL-MCNC: 0.6 MG/DL (ref 0.6–1.3)
EOSINOPHIL # BLD AUTO: 0.16 THOUSAND/ΜL (ref 0–0.61)
EOSINOPHIL NFR BLD AUTO: 2 % (ref 0–6)
ERYTHROCYTE [DISTWIDTH] IN BLOOD BY AUTOMATED COUNT: 12.8 % (ref 11.6–15.1)
EST. AVERAGE GLUCOSE BLD GHB EST-MCNC: 126 MG/DL
GFR SERPL CREATININE-BSD FRML MDRD: 99 ML/MIN/1.73SQ M
GLUCOSE P FAST SERPL-MCNC: 154 MG/DL (ref 65–99)
HBA1C MFR BLD: 6 %
HCT VFR BLD AUTO: 45.5 % (ref 34.8–46.1)
HDLC SERPL-MCNC: 45 MG/DL
HGB BLD-MCNC: 14.8 G/DL (ref 11.5–15.4)
IMM GRANULOCYTES # BLD AUTO: 0.02 THOUSAND/UL (ref 0–0.2)
IMM GRANULOCYTES NFR BLD AUTO: 0 % (ref 0–2)
LDLC SERPL CALC-MCNC: 25 MG/DL (ref 0–100)
LYMPHOCYTES # BLD AUTO: 2.18 THOUSANDS/ΜL (ref 0.6–4.47)
LYMPHOCYTES NFR BLD AUTO: 33 % (ref 14–44)
MCH RBC QN AUTO: 30.4 PG (ref 26.8–34.3)
MCHC RBC AUTO-ENTMCNC: 32.5 G/DL (ref 31.4–37.4)
MCV RBC AUTO: 93 FL (ref 82–98)
MONOCYTES # BLD AUTO: 0.66 THOUSAND/ΜL (ref 0.17–1.22)
MONOCYTES NFR BLD AUTO: 10 % (ref 4–12)
NEUTROPHILS # BLD AUTO: 3.51 THOUSANDS/ΜL (ref 1.85–7.62)
NEUTS SEG NFR BLD AUTO: 55 % (ref 43–75)
NONHDLC SERPL-MCNC: 48 MG/DL
NRBC BLD AUTO-RTO: 0 /100 WBCS
PLATELET # BLD AUTO: 230 THOUSANDS/UL (ref 149–390)
PMV BLD AUTO: 9.9 FL (ref 8.9–12.7)
POTASSIUM SERPL-SCNC: 4.4 MMOL/L (ref 3.5–5.3)
PROT SERPL-MCNC: 7.2 G/DL (ref 6.4–8.2)
RBC # BLD AUTO: 4.87 MILLION/UL (ref 3.81–5.12)
SODIUM SERPL-SCNC: 141 MMOL/L (ref 136–145)
T3 SERPL-MCNC: 1.7 NG/ML (ref 0.6–1.8)
T4 FREE SERPL-MCNC: 1.43 NG/DL (ref 0.76–1.46)
TRIGL SERPL-MCNC: 114 MG/DL
TSH SERPL DL<=0.05 MIU/L-ACNC: 0.05 UIU/ML (ref 0.36–3.74)
WBC # BLD AUTO: 6.55 THOUSAND/UL (ref 4.31–10.16)

## 2021-09-20 PROCEDURE — 84439 ASSAY OF FREE THYROXINE: CPT

## 2021-09-20 PROCEDURE — 36415 COLL VENOUS BLD VENIPUNCTURE: CPT

## 2021-09-20 PROCEDURE — 84443 ASSAY THYROID STIM HORMONE: CPT

## 2021-09-20 PROCEDURE — 84480 ASSAY TRIIODOTHYRONINE (T3): CPT

## 2021-09-20 PROCEDURE — 80053 COMPREHEN METABOLIC PANEL: CPT

## 2021-09-20 PROCEDURE — 80061 LIPID PANEL: CPT

## 2021-09-20 PROCEDURE — 99213 OFFICE O/P EST LOW 20 MIN: CPT | Performed by: INTERNAL MEDICINE

## 2021-09-20 PROCEDURE — 83036 HEMOGLOBIN GLYCOSYLATED A1C: CPT

## 2021-09-20 PROCEDURE — 85025 COMPLETE CBC W/AUTO DIFF WBC: CPT

## 2021-09-20 NOTE — PROGRESS NOTES
INTERNAL MEDICINE FOLLOW-UP OFFICE VISIT  [facility]IC[facility] AT [facility]    NAME: Cathy Gallegos  AGE: 64 y o  SEX: female  : 1960   MRN: [id number]    DATE: 2021  TIME: 9:00 AM    Assessment and Plan     Diagnoses and all orders for this visit:    Acquired hypothyroidism  -     TSH, 3rd generation; Future  -     T4, free; Future  -     T3; Future    Will FU with the results of thyroid tests    Chronic hepatitis C without hepatic coma ([facility] [address] )   Was advised to see GI    Skin lesions  -     Ambulatory referral to Dermatology; Future        - Counseling Documentation: patient was counseled regarding: diagnostic results, instructions for management, risk factor reductions, prognosis, patient and family education, risks and benefits of treatment options and importance of compliance with treatment  - Medication Side Effects: Adverse side effects of medications were reviewed with the patient/guardian today  Return to office in: as needed    Chief Complaint     Chief Complaint   Patient presents with    lumps on left arm    Follow-up     review thyroid med and weight gain       History of Present Illness     HPI  Says she has been gaining weight and wants me to increase the dose of levothyroxine  Hep C ab was positive and she was advised to see GI  She has dryness and a rash on her elbows      The following portions of the patient's history were reviewed and updated as appropriate: allergies, current medications, past family history, past medical history, past social history, past surgical history and problem list     Review of Systems     Review of Systems   Constitutional: Negative for chills, diaphoresis, fatigue and fever  Complains of weight gain   HENT: Negative for congestion, ear discharge, ear pain, hearing loss, postnasal drip, rhinorrhea, sinus pressure, sinus pain, sneezing, sore throat and voice change      Eyes: Negative for pain, discharge, redness and visual disturbance  Respiratory: Negative for cough, chest tightness, shortness of breath and wheezing  Cardiovascular: Negative for chest pain, palpitations and leg swelling  Gastrointestinal: Negative for abdominal distention, abdominal pain, blood in stool, constipation, diarrhea, nausea and vomiting  Endocrine: Negative for cold intolerance, heat intolerance, polydipsia, polyphagia and polyuria  Genitourinary: Negative for dysuria, flank pain, frequency, hematuria and urgency  Musculoskeletal: Negative for arthralgias, back pain, gait problem, joint swelling, myalgias, neck pain and neck stiffness  Skin: Positive for rash  Neurological: Negative for dizziness, tremors, syncope, facial asymmetry, speech difficulty, weakness, light-headedness, numbness and headaches  Hematological: Does not bruise/bleed easily  Psychiatric/Behavioral: Negative for behavioral problems, confusion and sleep disturbance  The patient is not nervous/anxious  Active Problem List     Patient Active Problem List   Diagnosis    Acquired hypothyroidism    Essential hypertension    Recurrent major depressive disorder, in partial remission ([facility] [address] )    Mixed hyperlipidemia    Gastroesophageal reflux disease without esophagitis    Opioid dependence ([facility] [address] )    Hyperglycemia    Overweight    Cigarette nicotine dependence without complication    Chronic pain of right knee    Chronic bilateral low back pain without sciatica    Peripheral vascular disease ([facility] [address] )    SOB (shortness of breath)    Esophageal dysphagia    History of colon polyps    DDD (degenerative disc disease), cervical    Chronic hepatitis C without hepatic coma (HCC)       Objective     /82   Pulse 77   Temp 99 6 °F (37 6 °C)   Ht 5' 5" (1 651 m)   Wt 82 6 kg (182 lb)   SpO2 96%   BMI 30 29 kg/m²     Physical Exam  Constitutional:       General: She is not in acute distress  Appearance: She is well-developed  She is not diaphoretic  HENT:      Head: Normocephalic and atraumatic  Right Ear: External ear normal       Left Ear: External ear normal       Nose: Nose normal    Eyes:      General: No scleral icterus  Right eye: No discharge  Left eye: No discharge  Conjunctiva/sclera: Conjunctivae normal    Neck:      Thyroid: No thyromegaly  Vascular: No JVD  Trachea: No tracheal deviation  Cardiovascular:      Rate and Rhythm: Normal rate and regular rhythm  Heart sounds: Normal heart sounds  No murmur heard  No friction rub  No gallop  Pulmonary:      Effort: Pulmonary effort is normal  No respiratory distress  Breath sounds: Normal breath sounds  No wheezing or rales  Chest:      Chest wall: No tenderness  Abdominal:      General: Bowel sounds are normal  There is no distension  Palpations: Abdomen is soft  Tenderness: There is no abdominal tenderness  There is no guarding or rebound  Musculoskeletal:         General: No tenderness  Normal range of motion  Cervical back: Normal range of motion and neck supple  Lymphadenopathy:      Cervical: No cervical adenopathy  Skin:     General: Skin is warm and dry  Findings: Rash present  No erythema  Neurological:      Mental Status: She is alert and oriented to person, place, and time  Cranial Nerves: No cranial nerve deficit  Motor: No abnormal muscle tone        Coordination: Coordination normal    Psychiatric:         Judgment: Judgment normal              Current Medications       Current Outpatient Medications:     albuterol (Ventolin HFA) 90 mcg/act inhaler, Inhale 2 puffs every 6 (six) hours as needed for wheezing, Disp: 18 g, Rfl: 3    aspirin 81 MG tablet, Take 1 tablet by mouth daily, Disp: , Rfl:     atorvastatin (LIPITOR) 20 mg tablet, TAKE 1 TABLET BY MOUTH EVERY DAY, Disp: 90 tablet, Rfl: 2    DULoxetine (CYMBALTA) 60 mg delayed release capsule, Take 1 capsule (60 mg total) by mouth daily, Disp: 90 capsule, Rfl: 2    gabapentin (NEURONTIN) 400 mg capsule, Take 400 mg by mouth 2 (two) times a day, Disp: , Rfl:     hydrochlorothiazide (HYDRODIURIL) 12 5 mg tablet, Take 1 tablet (12 5 mg total) by mouth daily, Disp: 90 tablet, Rfl: 2    ibuprofen (MOTRIN) 800 mg tablet, Take 800 mg by mouth 3 (three) times a day as needed, Disp: , Rfl:     levothyroxine 150 mcg tablet, TAKE 1 TABLET BY MOUTH EVERY DAY, Disp: 90 tablet, Rfl: 1    lisinopril (ZESTRIL) 5 mg tablet, TAKE 1 TABLET BY MOUTH EVERY DAY, Disp: 90 tablet, Rfl: 0    methocarbamol (ROBAXIN) 750 mg tablet, TAKE 1 TABLET BY MOUTH FOUR TIMES A DAY AS NEEDED, Disp: , Rfl:     pantoprazole (PROTONIX) 40 mg tablet, Take 1 tablet (40 mg total) by mouth daily, Disp: 30 tablet, Rfl: 2    potassium chloride (K-DUR,KLOR-CON) 20 mEq tablet, Take 1 tablet (20 mEq total) by mouth daily, Disp: 30 tablet, Rfl: 5    potassium chloride (Klor-Con M20) 20 mEq tablet, Klor-Con M20 mEq tablet,extended release, Disp: , Rfl:     pseudoephedrine (SUDAFED) 30 mg tablet, Take 60 mg by mouth every 4 (four) hours as needed for congestion, Disp: , Rfl:     SUBOXONE 8-2 MG, USE 2 & 1/2 FILMS UNDER TONGUE DAILY, Disp: , Rfl: 0    Health Maintenance     Health Maintenance   Topic Date Due    Hepatitis A Vaccine (1 of 2 - Risk 2-dose series) Never done    Pneumococcal Vaccine: Pediatrics (0 to 5 Years) and At-Risk Patients (6 to 59 Years) (1 of 2 - PPSV23) 01/14/1966    Annual Physical  Never done    Hepatitis B Vaccine (1 of 3 - Risk 3-dose series) Never done    DTaP,Tdap,and Td Vaccines (1 - Tdap) 01/14/1981    Cervical Cancer Screening  05/22/2020    Influenza Vaccine (1) 09/01/2021    BMI: Followup Plan  06/28/2022    BMI: Adult  08/24/2022    Breast Cancer Screening: Mammogram  08/24/2022    Depression Remission PHQ  09/20/2022    Colorectal Cancer Screening  06/30/2027    HIV Screening  Completed    COVID-19 Vaccine  Completed    HIB Vaccine  Aged [place]  IPV Vaccine  Aged Out    Meningococcal ACWY Vaccine  Aged Out    HPV Vaccine  Aged Out    Hepatitis C Screening  Discontinued     Immunization History   Administered Date(s) Administered    INFLUENZA 11/16/2018    Influenza, recombinant, quadrivalent,injectable, preservative free 11/16/2018, 09/23/2020    Influenza, seasonal, injectable 1960    Pneumococcal Polysaccharide PPV23 1960    SARS-CoV-2 / COVID-19 mRNA IM (Pfizer-BioNTech) 05/12/2021, 06/03/2021    Tdap 1960    Zoster 1960         Carol Nathan MD  [id number] [facility]AVIOR[facility]IC[facility] AT [facility]

## 2021-09-20 NOTE — TELEPHONE ENCOUNTER
----- Message from Louis Maxwell MD sent at 9/20/2021  5:04 PM EDT -----   The blood work for the thyroid is still on the lower side    I do not agree with increasing the thyroid medicine

## 2021-09-21 ENCOUNTER — TELEPHONE (OUTPATIENT)
Dept: INTERNAL MEDICINE CLINIC | Facility: CLINIC | Age: 61
End: 2021-09-21

## 2021-09-21 NOTE — TELEPHONE ENCOUNTER
----- Message from Lesly Brown MD sent at 9/21/2021 10:57 AM EDT -----    Blood sugar is much higher than before, please cut down on the carbohydrates

## 2021-09-24 ENCOUNTER — TELEPHONE (OUTPATIENT)
Dept: GASTROENTEROLOGY | Facility: HOSPITAL | Age: 61
End: 2021-09-24

## 2021-09-27 ENCOUNTER — HOSPITAL ENCOUNTER (OUTPATIENT)
Dept: GASTROENTEROLOGY | Facility: HOSPITAL | Age: 61
Setting detail: OUTPATIENT SURGERY
Discharge: HOME | End: 2021-09-27
Attending: INTERNAL MEDICINE | Admitting: INTERNAL MEDICINE
Payer: COMMERCIAL

## 2021-09-27 ENCOUNTER — ANESTHESIA (OUTPATIENT)
Dept: GASTROENTEROLOGY | Facility: HOSPITAL | Age: 61
End: 2021-09-27

## 2021-09-27 ENCOUNTER — ANESTHESIA EVENT (OUTPATIENT)
Dept: GASTROENTEROLOGY | Facility: HOSPITAL | Age: 61
End: 2021-09-27

## 2021-09-27 VITALS
HEART RATE: 59 BPM | DIASTOLIC BLOOD PRESSURE: 76 MMHG | TEMPERATURE: 97.2 F | OXYGEN SATURATION: 96 % | RESPIRATION RATE: 18 BRPM | SYSTOLIC BLOOD PRESSURE: 154 MMHG

## 2021-09-27 DIAGNOSIS — K21.9 GASTROESOPHAGEAL REFLUX DISEASE WITHOUT ESOPHAGITIS: ICD-10-CM

## 2021-09-27 DIAGNOSIS — R13.19 ESOPHAGEAL DYSPHAGIA: ICD-10-CM

## 2021-09-27 DIAGNOSIS — Z86.010 HISTORY OF COLON POLYPS: ICD-10-CM

## 2021-09-27 PROCEDURE — 88305 TISSUE EXAM BY PATHOLOGIST: CPT | Performed by: PATHOLOGY

## 2021-09-27 PROCEDURE — 45378 DIAGNOSTIC COLONOSCOPY: CPT | Performed by: INTERNAL MEDICINE

## 2021-09-27 PROCEDURE — 43239 EGD BIOPSY SINGLE/MULTIPLE: CPT | Performed by: INTERNAL MEDICINE

## 2021-09-27 RX ORDER — PROPOFOL 10 MG/ML
INJECTION, EMULSION INTRAVENOUS AS NEEDED
Status: DISCONTINUED | OUTPATIENT
Start: 2021-09-27 | End: 2021-09-27

## 2021-09-27 RX ORDER — LIDOCAINE HYDROCHLORIDE 10 MG/ML
INJECTION, SOLUTION EPIDURAL; INFILTRATION; INTRACAUDAL; PERINEURAL AS NEEDED
Status: DISCONTINUED | OUTPATIENT
Start: 2021-09-27 | End: 2021-09-27

## 2021-09-27 RX ORDER — SODIUM CHLORIDE, SODIUM LACTATE, POTASSIUM CHLORIDE, CALCIUM CHLORIDE 600; 310; 30; 20 MG/100ML; MG/100ML; MG/100ML; MG/100ML
125 INJECTION, SOLUTION INTRAVENOUS CONTINUOUS
Status: DISCONTINUED | OUTPATIENT
Start: 2021-09-27 | End: 2021-10-01 | Stop reason: HOSPADM

## 2021-09-27 RX ADMIN — PROPOFOL 100 MG: 10 INJECTION, EMULSION INTRAVENOUS at 08:08

## 2021-09-27 RX ADMIN — PROPOFOL 50 MG: 10 INJECTION, EMULSION INTRAVENOUS at 08:09

## 2021-09-27 RX ADMIN — PROPOFOL 20 MG: 10 INJECTION, EMULSION INTRAVENOUS at 08:20

## 2021-09-27 RX ADMIN — PROPOFOL 30 MG: 10 INJECTION, EMULSION INTRAVENOUS at 08:21

## 2021-09-27 RX ADMIN — SODIUM CHLORIDE, SODIUM LACTATE, POTASSIUM CHLORIDE, AND CALCIUM CHLORIDE 125 ML/HR: .6; .31; .03; .02 INJECTION, SOLUTION INTRAVENOUS at 07:31

## 2021-09-27 RX ADMIN — PROPOFOL 30 MG: 10 INJECTION, EMULSION INTRAVENOUS at 08:16

## 2021-09-27 RX ADMIN — LIDOCAINE HYDROCHLORIDE 90 MG: 10 INJECTION, SOLUTION EPIDURAL; INFILTRATION; INTRACAUDAL at 08:08

## 2021-09-27 NOTE — ANESTHESIA PREPROCEDURE EVALUATION
Procedure:  EGD  COLONOSCOPY    Relevant Problems   CARDIO   (+) Essential hypertension   (+) Mixed hyperlipidemia      ENDO   (+) Acquired hypothyroidism      GI/HEPATIC   (+) Chronic hepatitis C without hepatic coma (HCC)   (+) Esophageal dysphagia   (+) Gastroesophageal reflux disease without esophagitis      MUSCULOSKELETAL   (+) Chronic bilateral low back pain without sciatica   (+) DDD (degenerative disc disease), cervical      NEURO/PSYCH   (+) History of colon polyps   (+) Recurrent major depressive disorder, in partial remission (HCC)      PULMONARY   (+) SOB (shortness of breath)        Physical Exam    Airway    Mallampati score: III  TM Distance: >3 FB  Neck ROM: full     Dental       Cardiovascular  Cardiovascular exam normal    Pulmonary  Pulmonary exam normal     Other Findings      51-year-old female with solid food dysphagia hiccups and a dry mouth over the last 6 weeks without overt peptic symptoms  In addition she has a remote history of polyps 15 years ago and had negative exam 5 years a     1 will give Protonix 40 mg daily for 8 weeks     2 will do EGD and colonoscopy to investigate     Chief Complaint:  GERD and history of polyp     HPI:  She is a 51-year-old female with 5 weeks of intermittent solid food dysphagia  She reports hiccups  She reports a dryness when she swallows  She has no heartburn regurgitation  She reports that she has had to regurgitate food twice  She has no melena  No NSAID use no weight loss no nausea or vomiting  She has no pain in her abdomen  She has never been diagnosed formally with reflux before  She went to an urgent care and they gave her a week of omeprazole and famotidine and she reports this did not help her  She had a colonoscopy with polyps 15 years ago and then had a follow-up exam 5 years ago and is due for colon cancer surveillance  Anesthesia Plan  ASA Score- 3     Anesthesia Type- IV sedation with anesthesia with ASA Monitors  Additional Monitors:   Airway Plan:           Plan Factors-    Chart reviewed  EKG reviewed  Imaging results reviewed  Existing labs reviewed  Patient summary reviewed  Patient is a current smoker  Induction- intravenous  Postoperative Plan-     Informed Consent- Anesthetic plan and risks discussed with patient  I personally reviewed this patient with the CRNA  Discussed and agreed on the Anesthesia Plan with the CRNA  Devonte Rico

## 2021-09-27 NOTE — ANESTHESIA POSTPROCEDURE EVALUATION
Post-Op Assessment Note    CV Status:  Stable  Pain Score: 0    Pain management: adequate     Mental Status:  Awake   Hydration Status:  Stable   PONV Controlled:  Controlled   Airway Patency:  Patent      Post Op Vitals Reviewed: Yes      Staff: CRNA         No complications documented      BP   133/88   Temp      Pulse  64   Resp   13   SpO2   98

## 2021-10-04 ENCOUNTER — OFFICE VISIT (OUTPATIENT)
Dept: INTERNAL MEDICINE CLINIC | Facility: CLINIC | Age: 61
End: 2021-10-04
Payer: COMMERCIAL

## 2021-10-04 VITALS
HEART RATE: 69 BPM | OXYGEN SATURATION: 93 % | HEIGHT: 65 IN | WEIGHT: 180.6 LBS | BODY MASS INDEX: 30.09 KG/M2 | TEMPERATURE: 97.7 F | SYSTOLIC BLOOD PRESSURE: 142 MMHG | RESPIRATION RATE: 18 BRPM | DIASTOLIC BLOOD PRESSURE: 86 MMHG

## 2021-10-04 DIAGNOSIS — I10 ESSENTIAL HYPERTENSION: Primary | ICD-10-CM

## 2021-10-04 DIAGNOSIS — R73.9 HYPERGLYCEMIA: ICD-10-CM

## 2021-10-04 DIAGNOSIS — E78.2 MIXED HYPERLIPIDEMIA: ICD-10-CM

## 2021-10-04 DIAGNOSIS — E03.9 ACQUIRED HYPOTHYROIDISM: ICD-10-CM

## 2021-10-04 DIAGNOSIS — K21.9 GASTROESOPHAGEAL REFLUX DISEASE WITHOUT ESOPHAGITIS: ICD-10-CM

## 2021-10-04 DIAGNOSIS — Z23 ENCOUNTER FOR IMMUNIZATION: ICD-10-CM

## 2021-10-04 DIAGNOSIS — F17.210 CIGARETTE NICOTINE DEPENDENCE WITHOUT COMPLICATION: ICD-10-CM

## 2021-10-04 DIAGNOSIS — F33.41 RECURRENT MAJOR DEPRESSIVE DISORDER, IN PARTIAL REMISSION (HCC): ICD-10-CM

## 2021-10-04 PROCEDURE — 99214 OFFICE O/P EST MOD 30 MIN: CPT | Performed by: INTERNAL MEDICINE

## 2021-10-04 PROCEDURE — 90471 IMMUNIZATION ADMIN: CPT | Performed by: INTERNAL MEDICINE

## 2021-10-04 PROCEDURE — 90682 RIV4 VACC RECOMBINANT DNA IM: CPT | Performed by: INTERNAL MEDICINE

## 2021-10-04 RX ORDER — LISINOPRIL 10 MG/1
10 TABLET ORAL DAILY
Qty: 90 TABLET | Refills: 3 | Status: SHIPPED | OUTPATIENT
Start: 2021-10-04

## 2021-11-02 ENCOUNTER — TELEPHONE (OUTPATIENT)
Dept: GASTROENTEROLOGY | Facility: CLINIC | Age: 61
End: 2021-11-02

## 2021-11-04 DIAGNOSIS — R13.19 ESOPHAGEAL DYSPHAGIA: ICD-10-CM

## 2021-11-04 DIAGNOSIS — K21.9 GASTROESOPHAGEAL REFLUX DISEASE WITHOUT ESOPHAGITIS: ICD-10-CM

## 2021-11-04 RX ORDER — PANTOPRAZOLE SODIUM 40 MG/1
TABLET, DELAYED RELEASE ORAL
Qty: 30 TABLET | Refills: 2 | Status: SHIPPED | OUTPATIENT
Start: 2021-11-04 | End: 2022-01-29

## 2021-12-04 ENCOUNTER — TELEPHONE (OUTPATIENT)
Dept: OBGYN CLINIC | Facility: HOSPITAL | Age: 61
End: 2021-12-04

## 2021-12-21 ENCOUNTER — VBI (OUTPATIENT)
Dept: ADMINISTRATIVE | Facility: OTHER | Age: 61
End: 2021-12-21

## 2021-12-30 DIAGNOSIS — E87.6 HYPOKALEMIA: ICD-10-CM

## 2021-12-30 RX ORDER — POTASSIUM CHLORIDE 1500 MG/1
TABLET, EXTENDED RELEASE ORAL
Qty: 30 TABLET | Refills: 5 | Status: SHIPPED | OUTPATIENT
Start: 2021-12-30 | End: 2022-06-24

## 2022-01-04 DIAGNOSIS — I10 ESSENTIAL HYPERTENSION: ICD-10-CM

## 2022-01-04 DIAGNOSIS — E03.9 ACQUIRED HYPOTHYROIDISM: ICD-10-CM

## 2022-01-04 RX ORDER — LEVOTHYROXINE SODIUM 0.15 MG/1
TABLET ORAL
Qty: 90 TABLET | Refills: 1 | Status: SHIPPED | OUTPATIENT
Start: 2022-01-04 | End: 2022-05-28

## 2022-01-04 RX ORDER — LISINOPRIL 5 MG/1
TABLET ORAL
Qty: 90 TABLET | Refills: 0 | Status: SHIPPED | OUTPATIENT
Start: 2022-01-04 | End: 2022-02-21

## 2022-01-27 DIAGNOSIS — R06.02 SOB (SHORTNESS OF BREATH): ICD-10-CM

## 2022-01-27 RX ORDER — ALBUTEROL SULFATE 90 UG/1
AEROSOL, METERED RESPIRATORY (INHALATION)
Qty: 18 G | Refills: 3 | Status: SHIPPED | OUTPATIENT
Start: 2022-01-27

## 2022-01-29 DIAGNOSIS — K21.9 GASTROESOPHAGEAL REFLUX DISEASE WITHOUT ESOPHAGITIS: ICD-10-CM

## 2022-01-29 DIAGNOSIS — R13.19 ESOPHAGEAL DYSPHAGIA: ICD-10-CM

## 2022-01-29 RX ORDER — PANTOPRAZOLE SODIUM 40 MG/1
TABLET, DELAYED RELEASE ORAL
Qty: 30 TABLET | Refills: 2 | Status: SHIPPED | OUTPATIENT
Start: 2022-01-29 | End: 2022-04-28

## 2022-02-03 DIAGNOSIS — E78.2 MIXED HYPERLIPIDEMIA: ICD-10-CM

## 2022-02-03 RX ORDER — ATORVASTATIN CALCIUM 20 MG/1
TABLET, FILM COATED ORAL
Qty: 90 TABLET | Refills: 2 | Status: SHIPPED | OUTPATIENT
Start: 2022-02-03

## 2022-02-21 ENCOUNTER — APPOINTMENT (OUTPATIENT)
Dept: LAB | Facility: CLINIC | Age: 62
End: 2022-02-21
Payer: COMMERCIAL

## 2022-02-21 ENCOUNTER — OFFICE VISIT (OUTPATIENT)
Dept: INTERNAL MEDICINE CLINIC | Facility: CLINIC | Age: 62
End: 2022-02-21
Payer: COMMERCIAL

## 2022-02-21 VITALS
HEART RATE: 66 BPM | RESPIRATION RATE: 16 BRPM | DIASTOLIC BLOOD PRESSURE: 88 MMHG | SYSTOLIC BLOOD PRESSURE: 138 MMHG | WEIGHT: 169.2 LBS | TEMPERATURE: 96.4 F | OXYGEN SATURATION: 96 % | BODY MASS INDEX: 28.19 KG/M2 | HEIGHT: 65 IN

## 2022-02-21 DIAGNOSIS — E78.2 MIXED HYPERLIPIDEMIA: ICD-10-CM

## 2022-02-21 DIAGNOSIS — F33.41 RECURRENT MAJOR DEPRESSIVE DISORDER, IN PARTIAL REMISSION (HCC): ICD-10-CM

## 2022-02-21 DIAGNOSIS — I10 ESSENTIAL HYPERTENSION: Primary | ICD-10-CM

## 2022-02-21 DIAGNOSIS — F17.210 CIGARETTE NICOTINE DEPENDENCE WITHOUT COMPLICATION: ICD-10-CM

## 2022-02-21 DIAGNOSIS — E66.3 OVERWEIGHT: ICD-10-CM

## 2022-02-21 DIAGNOSIS — R73.9 HYPERGLYCEMIA: ICD-10-CM

## 2022-02-21 DIAGNOSIS — E03.9 ACQUIRED HYPOTHYROIDISM: ICD-10-CM

## 2022-02-21 LAB
ALBUMIN SERPL BCP-MCNC: 3.8 G/DL (ref 3.5–5)
ALP SERPL-CCNC: 66 U/L (ref 46–116)
ALT SERPL W P-5'-P-CCNC: 65 U/L (ref 12–78)
ANION GAP SERPL CALCULATED.3IONS-SCNC: 3 MMOL/L (ref 4–13)
AST SERPL W P-5'-P-CCNC: 50 U/L (ref 5–45)
BASOPHILS # BLD AUTO: 0.03 THOUSANDS/ΜL (ref 0–0.1)
BASOPHILS NFR BLD AUTO: 0 % (ref 0–1)
BILIRUB SERPL-MCNC: 0.93 MG/DL (ref 0.2–1)
BUN SERPL-MCNC: 17 MG/DL (ref 5–25)
CALCIUM SERPL-MCNC: 9.4 MG/DL (ref 8.3–10.1)
CHLORIDE SERPL-SCNC: 106 MMOL/L (ref 100–108)
CHOLEST SERPL-MCNC: 109 MG/DL
CO2 SERPL-SCNC: 29 MMOL/L (ref 21–32)
CREAT SERPL-MCNC: 0.57 MG/DL (ref 0.6–1.3)
EOSINOPHIL # BLD AUTO: 0.1 THOUSAND/ΜL (ref 0–0.61)
EOSINOPHIL NFR BLD AUTO: 1 % (ref 0–6)
ERYTHROCYTE [DISTWIDTH] IN BLOOD BY AUTOMATED COUNT: 13.2 % (ref 11.6–15.1)
EST. AVERAGE GLUCOSE BLD GHB EST-MCNC: 114 MG/DL
GFR SERPL CREATININE-BSD FRML MDRD: 99 ML/MIN/1.73SQ M
GLUCOSE P FAST SERPL-MCNC: 107 MG/DL (ref 65–99)
HBA1C MFR BLD: 5.6 %
HCT VFR BLD AUTO: 44.7 % (ref 34.8–46.1)
HDLC SERPL-MCNC: 54 MG/DL
HGB BLD-MCNC: 15.2 G/DL (ref 11.5–15.4)
IMM GRANULOCYTES # BLD AUTO: 0.01 THOUSAND/UL (ref 0–0.2)
IMM GRANULOCYTES NFR BLD AUTO: 0 % (ref 0–2)
LDLC SERPL CALC-MCNC: 40 MG/DL (ref 0–100)
LYMPHOCYTES # BLD AUTO: 2.31 THOUSANDS/ΜL (ref 0.6–4.47)
LYMPHOCYTES NFR BLD AUTO: 31 % (ref 14–44)
MCH RBC QN AUTO: 30.5 PG (ref 26.8–34.3)
MCHC RBC AUTO-ENTMCNC: 34 G/DL (ref 31.4–37.4)
MCV RBC AUTO: 90 FL (ref 82–98)
MONOCYTES # BLD AUTO: 0.76 THOUSAND/ΜL (ref 0.17–1.22)
MONOCYTES NFR BLD AUTO: 10 % (ref 4–12)
NEUTROPHILS # BLD AUTO: 4.2 THOUSANDS/ΜL (ref 1.85–7.62)
NEUTS SEG NFR BLD AUTO: 58 % (ref 43–75)
NONHDLC SERPL-MCNC: 55 MG/DL
NRBC BLD AUTO-RTO: 0 /100 WBCS
PLATELET # BLD AUTO: 211 THOUSANDS/UL (ref 149–390)
PMV BLD AUTO: 10.3 FL (ref 8.9–12.7)
POTASSIUM SERPL-SCNC: 4 MMOL/L (ref 3.5–5.3)
PROT SERPL-MCNC: 7.6 G/DL (ref 6.4–8.2)
RBC # BLD AUTO: 4.98 MILLION/UL (ref 3.81–5.12)
SODIUM SERPL-SCNC: 138 MMOL/L (ref 136–145)
T4 FREE SERPL-MCNC: 1.42 NG/DL (ref 0.76–1.46)
TRIGL SERPL-MCNC: 76 MG/DL
TSH SERPL DL<=0.05 MIU/L-ACNC: 0.08 UIU/ML (ref 0.36–3.74)
WBC # BLD AUTO: 7.41 THOUSAND/UL (ref 4.31–10.16)

## 2022-02-21 PROCEDURE — 84439 ASSAY OF FREE THYROXINE: CPT

## 2022-02-21 PROCEDURE — 99214 OFFICE O/P EST MOD 30 MIN: CPT | Performed by: INTERNAL MEDICINE

## 2022-02-21 PROCEDURE — 83036 HEMOGLOBIN GLYCOSYLATED A1C: CPT

## 2022-02-21 PROCEDURE — 85025 COMPLETE CBC W/AUTO DIFF WBC: CPT

## 2022-02-21 PROCEDURE — 80061 LIPID PANEL: CPT

## 2022-02-21 PROCEDURE — 84443 ASSAY THYROID STIM HORMONE: CPT

## 2022-02-21 PROCEDURE — 80053 COMPREHEN METABOLIC PANEL: CPT

## 2022-02-21 PROCEDURE — 36415 COLL VENOUS BLD VENIPUNCTURE: CPT

## 2022-02-21 NOTE — PROGRESS NOTES
INTERNAL MEDICINE OFFICE VISIT  [facility]  [facility], [address]  Tel: [phone]      NAME: Jyothi King  AGE: 58 y o  SEX: female  : 1960   MRN: [id number]    DATE: 2022  TIME: 9:44 AM      Assessment and Plan:  1  Essential hypertension   continue present medication    2  Mixed hyperlipidemia   continue atorvastatin at the same dose  - CBC and differential; Future  - Comprehensive metabolic panel; Future  - Lipid panel; Future  - TSH, 3rd generation; Future    3  Hyperglycemia   was advised to cut down on the carbohydrates in the diet  - Hemoglobin A1C; Future    4  Recurrent major depressive disorder, in partial remission ([address] )   continue Cymbalta    5  Cigarette nicotine dependence without complication   was counseled to quit smoking    6  Overweight  BMI Counseling: Body mass index is 28 16 kg/m²  The BMI is above normal  Nutrition recommendations include decreasing portion sizes, encouraging healthy choices of fruits and vegetables and moderation in carbohydrate intake  Exercise recommendations include moderate physical activity 150 minutes/week  Rationale for BMI follow-up plan is due to patient being overweight or obese  Tobacco Cessation Counseling: Tobacco cessation counseling was provided  The patient is sincerely urged to quit consumption of tobacco  She is not ready to quit tobacco          - Counseling Documentation: patient was counseled regarding: diagnostic results, instructions for management, risk factor reductions, prognosis, patient and family education, risks and benefits of treatment options and importance of compliance with treatment  - Medication Side Effects: Adverse side effects of medications were reviewed with the patient/guardian today  Return for follow up visit in  4 months or earlier, if needed        Chief Complaint:  Chief Complaint   Patient presents with    Follow-up     4 month w/o labs          History of Present Illness:    blood pressure is borderline high and she was told to monitor blood pressure at home   Cholesterol is stable, as of 5 months ago    Depression is well controlled  She continues to smoke   She needs to lose weight    Active Problem List:  Patient Active Problem List   Diagnosis    Acquired hypothyroidism    Essential hypertension    Recurrent major depressive disorder, in partial remission ([facility] )    Mixed hyperlipidemia    Gastroesophageal reflux disease without esophagitis    Opioid dependence ([facility] )    Hyperglycemia    Overweight    Cigarette nicotine dependence without complication    Chronic pain of right knee    Chronic bilateral low back pain without sciatica    Peripheral vascular disease ([facility] )    SOB (shortness of breath)    Esophageal dysphagia    History of colon polyps    DDD (degenerative disc disease), cervical    Chronic hepatitis C without hepatic coma (HCC)         Past Medical History:  Past Medical History:   Diagnosis Date    Abnormal ECG     Denial     Heart aneurysm     Heart murmur     Hypertension     Hypothyroidism     Infectious viral hepatitis     hep c    PAD (peripheral artery disease) ([place] [address] )          Past Surgical History:  Past Surgical History:   Procedure Laterality Date    AUGMENTATION MAMMAPLASTY       SECTION      COLONOSCOPY  2021    ENDOSCOPY OF POUCH  2021         Family History:  Family History   Problem Relation Age of Onset    Coronary artery disease Father     Other Father         Denial    Other Mother         Denial    Vaginal cancer Mother 66    Celiac disease Son     Celiac disease Son          Social History:  Social History     Socioeconomic History    Marital status:      Spouse name: None    Number of children: None    Years of education: None    Highest education level: None   Occupational History    None   Tobacco Use    Smoking status: Current Every Day Smoker Packs/day: 1 00     Years: 20 00     Pack years: 20 00     Types: Cigarettes    Smokeless tobacco: Never Used   Vaping Use    Vaping Use: Never used   Substance and Sexual Activity    Alcohol use: Yes     Alcohol/week: 2 0 standard drinks     Types: 1 Glasses of wine, 1 Cans of beer per week    Drug use: No    Sexual activity: Yes     Partners: Male   Other Topics Concern    None   Social History Narrative    None     Social Determinants of Health     Financial Resource Strain: Not on file   Food Insecurity: Not on file   Transportation Needs: Not on file   Physical Activity: Inactive    Days of Exercise per Week: 0 days   Synthace of Exercise per Session: 0 min   Stress: Stress Concern Present    Feeling of Stress : Rather much   Social Connections: Not on file   Intimate Partner Violence: Not on file   Housing Stability: Not on file         Allergies:   Allergies   Allergen Reactions    Penicillins Itching         Medications:    Current Outpatient Medications:     albuterol (PROVENTIL HFA,VENTOLIN HFA) 90 mcg/act inhaler, TAKE 2 PUFFS BY MOUTH EVERY 6 HOURS AS NEEDED FOR WHEEZE, Disp: 18 g, Rfl: 3    aspirin 81 MG tablet, Take 1 tablet by mouth daily, Disp: , Rfl:     atorvastatin (LIPITOR) 20 mg tablet, TAKE 1 TABLET BY MOUTH EVERY DAY, Disp: 90 tablet, Rfl: 2    DULoxetine (CYMBALTA) 60 mg delayed release capsule, Take 1 capsule (60 mg total) by mouth daily, Disp: 90 capsule, Rfl: 2    gabapentin (NEURONTIN) 400 mg capsule, Take 400 mg by mouth 2 (two) times a day, Disp: , Rfl:     hydrochlorothiazide (HYDRODIURIL) 12 5 mg tablet, Take 1 tablet (12 5 mg total) by mouth daily, Disp: 90 tablet, Rfl: 2    ibuprofen (MOTRIN) 800 mg tablet, Take 800 mg by mouth 3 (three) times a day as needed, Disp: , Rfl:     Klor-Con M20 20 MEQ tablet, TAKE 1 TABLET BY MOUTH EVERY DAY, Disp: 30 tablet, Rfl: 5    levothyroxine 150 mcg tablet, TAKE 1 TABLET BY MOUTH EVERY DAY, Disp: 90 tablet, Rfl: 1   lisinopril (ZESTRIL) 10 mg tablet, Take 1 tablet (10 mg total) by mouth daily, Disp: 90 tablet, Rfl: 3    methocarbamol (ROBAXIN) 750 mg tablet, TAKE 1 TABLET BY MOUTH FOUR TIMES A DAY AS NEEDED, Disp: , Rfl:     pantoprazole (PROTONIX) 40 mg tablet, TAKE 1 TABLET BY MOUTH EVERY DAY, Disp: 30 tablet, Rfl: 2    pseudoephedrine (SUDAFED) 30 mg tablet, Take 60 mg by mouth every 4 (four) hours as needed for congestion, Disp: , Rfl:     SUBOXONE 8-2 MG, USE 2 & 1/2 FILMS UNDER TONGUE DAILY, Disp: , Rfl: 0      The following portions of the patient's history were reviewed and updated as appropriate: past medical history, past surgical history, family history, social history, allergies, current medications and active problem list       Review of Systems:  Constitutional: Denies fever, chills, weight gain, weight loss, fatigue  Eyes: Denies eye redness, eye discharge, double vision, change in visual acuity  ENT: Denies hearing loss, tinnitus, sneezing, nasal congestion, nasal discharge, sore throat   Respiratory: Denies cough, expectoration, hemoptysis, shortness of breath, wheezing  Cardiovascular: Denies chest pain, palpitations, lower extremity swelling, orthopnea, PND  Gastrointestinal: Denies abdominal pain, heartburn, nausea, vomiting, hematemesis, diarrhea, bloody stools  Genito-Urinary: Denies dysuria, frequency, difficulty in micturition, nocturia, incontinence  Musculoskeletal: Denies back pain, joint pain, muscle pain  Neurologic: Denies confusion, lightheadedness, syncope, headache, focal weakness, sensory changes, seizures  Endocrine: Denies polyuria, polydipsia, temperature intolerance  Allergy and Immunology: Denies hives, insect bite sensitivity  Hematological and Lymphatic: Denies bleeding problems, swollen glands   Psychological: Denies depression, suicidal ideation, anxiety, panic, mood swings  Dermatological: Denies pruritus, rash, skin lesion changes      Vitals:  Vitals:    02/21/22 0943   BP: 138/88   Pulse:    Resp:    Temp:    SpO2:        Body mass index is 28 16 kg/m²  Weight (last 2 days)     Date/Time Weight    02/21/22 0926 76 7 (169 2)            Physical Examination:  General: Patient is not in acute distress  Awake, alert, responding to commands  No weight gain or loss  Head: Normocephalic  Atraumatic  Eyes: Conjunctiva and lids with no swelling, erythema or discharge  Both pupils normal sized, round and reactive to light  Sclera nonicteric  ENT: External examination of nose and ear normal  Otoscopic examination shows translucent tympanic membranes with patent canals without erythema  Oropharynx moist with no erythema, edema, exudate or lesions  Neck: Supple  JVP not raised  Trachea midline  No masses  No thyromegaly  Lungs: No signs of increased work of breathing or respiratory distress  Bilateral bronchovascular breath sounds with no crackles or rhonchi  Chest wall: No tenderness  Cardiovascular: Normal PMI  No thrills  Regular rate and rhythm  S1 and S2 normal  No murmur, rub or gallop  Gastrointestinal: Abdomen soft, nontender  No guarding or rigidity  Liver and spleen not palpable  Bowel sounds present  Neurologic: Cranial nerves II-XII intact   Cortical functions normal  Motor system - Reflexes 2+ and symmetrical  Sensations normal  Musculoskeletal: Gait normal  No joint tenderness  Integumentary: Skin normal with no rash or lesions  Lymphatic: No palpable lymph nodes in neck, axilla or groin  Extremities: No clubbing, cyanosis, edema or varicosities  Psychological: Judgement and insight normal  Mood and affect normal      Laboratory Results:  CBC with diff:   Lab Results   Component Value Date    WBC 6 55 09/20/2021    RBC 4 87 09/20/2021    HGB 14 8 09/20/2021    HCT 45 5 09/20/2021    MCV 93 09/20/2021    MCH 30 4 09/20/2021    RDW 12 8 09/20/2021     09/20/2021       CMP:  Lab Results   Component Value Date    CREATININE 0 60 09/20/2021    BUN 10 09/20/2021    K 4 4 09/20/2021     09/20/2021    CO2 31 09/20/2021    ALKPHOS 80 09/20/2021    ALT 56 09/20/2021    AST 45 09/20/2021       Lab Results   Component Value Date    HGBA1C 6 0 (H) 09/20/2021       No results found for: TROPONINI, CKMB, CKTOTAL    Lipid Profile:   No results found for: CHOL  Lab Results   Component Value Date    HDL 45 09/20/2021    HDL 46 06/28/2021     Lab Results   Component Value Date    LDLCALC 25 09/20/2021    LDLCALC 32 06/28/2021     Lab Results   Component Value Date    TRIG 114 09/20/2021    TRIG 86 06/28/2021       Imaging Results:  Colonoscopy  Narrative:  [facility]  [address] [facility] [address]  [phone]    DATE OF SERVICE:  9/27/21    PHYSICIAN(S):  Citlalli Maya MD - Attending Physician    INDICATION:  History of colon polyps  Colonoscopy performed for a diagnostic indication  POST-OP DIAGNOSIS:  See the impression below  HISTORY:  Prior colonoscopy: More than 10 years ago  BOWEL PREPARATION:  Miralax/Dulcolax    PREPROCEDURE:  Informed consent was obtained for the procedure, including sedation  Risks   including but not limited to bleeding, infection, perforation, adverse   drug reaction and aspiration were explained in detail  Also explained   about less than 100% sensitivity with the exam and other alternatives  The   patient was placed in the left lateral decubitus position  DETAILS OF PROCEDURE:  Patient was taken to the procedure room where a time out was performed to   confirm correct patient and correct procedure  The patient underwent   monitored anesthesia care, which was administered by an anesthesia   professional  The patient's blood pressure, heart rate, level of   consciousness, oxygen and respirations were monitored throughout the   procedure  A digital rectal exam was performed  The scope was introduced   through the anus and advanced to the cecum  Retroflexion was performed in   the rectum   The quality of bowel preparation was evaluated using the   Jin Bowel Preparation Scale with scores of: right colon = 2, transverse   colon = 2, left colon = 2  The total BBPS score was 6  Bowel prep was   adequate  The patient experienced no blood loss  The procedure was not   difficult  The patient tolerated the procedure well  There were no   apparent complications  ANESTHESIA INFORMATION:  ASA: III  Anesthesia Type: IV Sedation with Anesthesia    MEDICATIONS:  lactated ringers infusion 500 mL*    *From user-documented volume   (Totals for administrations occurring from 0805 to 0826 on 09/27/21)     FINDINGS:  All observed locations appeared normal, including the cecum, ascending   colon, hepatic flexure, transverse colon, splenic flexure, descending   colon, sigmoid colon, rectosigmoid and rectum  EVENTS:  Procedure Events   Event Event Time   ENDO CECUM REACHED 9/27/2021  8:21 AM   ENDO SCOPE OUT TIME 9/27/2021  8:26 AM     SPECIMENS:  ID Type Source Tests Collected by Time Destination   1 :  Tissue Duodenum TISSUE EXAM Sarah Oden MD 9/27/2021  8:13   AM    2 :  Tissue Stomach TISSUE EXAM Sarah Oden MD 9/27/2021  8:14   AM    3 :  Tissue Esophagus TISSUE EXAM Sarah Oden MD 9/27/2021  8:14   AM      EQUIPMENT:  Colonoscope -[id number]   Impression: No polyps  Normal colonoscopy    RECOMMENDATION:  Repeat colonoscopy in 5 years due to a personal history of colon polyps  High-fiber diet    Sarah Oden MD  EGD  Narrative:  [address] [place] [id number]  [phone]    DATE OF SERVICE:  9/27/21    PHYSICIAN(S):  Sarah Oden MD - Attending Physician    INDICATION:  Esophageal dysphagia, Gastroesophageal reflux disease without esophagitis    POST-OP DIAGNOSIS:  See the impression below  PREPROCEDURE:  Informed consent was obtained for the procedure, including sedation      Risks of perforation, hemorrhage, adverse drug reaction and aspiration   were discussed  The patient was placed in the left lateral decubitus   position  Patient was explained about the risks and benefits of the procedure  Risks   including but not limited to bleeding, infection, and perforation were   explained in detail  Also explained about less than 100% sensitivity with   the exam and other alternatives  DETAILS OF PROCEDURE:  Patient was taken to the procedure room where a time out was performed to   confirm correct patient and correct procedure  The patient underwent   monitored anesthesia care, which was administered by an anesthesia   professional  The patient's blood pressure, heart rate, level of   consciousness, respirations and oxygen were monitored throughout the   procedure  The scope was advanced to the second part of the duodenum  Retroflexion was performed in the fundus  The patient experienced no blood   loss  The procedure was not difficult  The patient tolerated the procedure   well  There were no apparent complications  ANESTHESIA INFORMATION:  ASA: III  Anesthesia Type: IV Sedation with Anesthesia    MEDICATIONS:  No administrations occurring from 0805 to 0813 on 09/27/21     FINDINGS:  The upper third of the esophagus, middle third of the esophagus and lower   third of the esophagus appeared normal  Performed random biopsy  Grade A esophagitis with mucosal breaks measuring less than 5 mm not   continuous between folds, covering less than 75% of the circumference in   the GE junction  Mild, patchy erythematous and friable mucosa in the fundus of the stomach,   body of the stomach and antrum; performed cold forceps biopsy to rule out   H  pylori  The duodenal bulb, 1st part of the duodenum and 2nd part of the duodenum   appeared normal  Performed random biopsy      SPECIMENS:  * No specimens in log *  Impression: LA grade a reflux esophagitis  Mild nonerosive gastritis    RECOMMENDATION:  Protonix 40 mg daily for 8-12 weeks  Reflux precautions and weight loss  Await pathology     Barnie Gosselin, MD       Health Maintenance:  Health Maintenance   Topic Date Due    Hepatitis A Vaccine (1 of 2 - Risk 2-dose series) Never done    Pneumococcal Vaccine: Pediatrics (0 to 5 Years) and At-Risk Patients (6 to 59 Years) (1 of 2 - PPSV23) 01/14/1966    Annual Physical  Never done    Hepatitis B Vaccine (1 of 3 - Risk 3-dose series) Never done    DTaP,Tdap,and Td Vaccines (1 - Tdap) 01/14/1981    Cervical Cancer Screening  05/22/2020    COVID-19 Vaccine (3 - Booster for Pfizer series) 11/03/2021    BMI: Followup Plan  06/28/2022    Breast Cancer Screening: Mammogram  08/24/2022    Depression Remission PHQ  09/20/2022    BMI: Adult  02/21/2023    Colorectal Cancer Screening  09/26/2026    HIV Screening  Completed    Influenza Vaccine  Completed    HIB Vaccine  Aged Out    IPV Vaccine  Aged Out    Meningococcal ACWY Vaccine  Aged Out    HPV Vaccine  Aged Out    Hepatitis C Screening  Discontinued     Immunization History   Administered Date(s) Administered    COVID-19 PFIZER VACCINE 0 3 ML IM 05/12/2021, 06/03/2021    INFLUENZA 11/16/2018    Influenza, recombinant, quadrivalent,injectable, preservative free 11/16/2018, 09/23/2020, 10/04/2021    Influenza, seasonal, injectable 1960    Pneumococcal Polysaccharide PPV23 1960    Tdap 1960    Zoster 1960         Oswaldo Randhawa MD  3/01/6680,1:07 AM

## 2022-03-21 ENCOUNTER — TELEPHONE (OUTPATIENT)
Dept: GASTROENTEROLOGY | Facility: CLINIC | Age: 62
End: 2022-03-21

## 2022-03-22 ENCOUNTER — OFFICE VISIT (OUTPATIENT)
Dept: GASTROENTEROLOGY | Facility: CLINIC | Age: 62
End: 2022-03-22
Payer: COMMERCIAL

## 2022-03-22 ENCOUNTER — APPOINTMENT (OUTPATIENT)
Dept: LAB | Facility: CLINIC | Age: 62
End: 2022-03-22
Payer: COMMERCIAL

## 2022-03-22 VITALS
DIASTOLIC BLOOD PRESSURE: 90 MMHG | HEART RATE: 66 BPM | BODY MASS INDEX: 28.82 KG/M2 | HEIGHT: 65 IN | WEIGHT: 173 LBS | SYSTOLIC BLOOD PRESSURE: 148 MMHG

## 2022-03-22 DIAGNOSIS — B18.2 CHRONIC HEPATITIS C WITHOUT HEPATIC COMA (HCC): Primary | ICD-10-CM

## 2022-03-22 DIAGNOSIS — B18.2 CHRONIC HEPATITIS C WITHOUT HEPATIC COMA (HCC): ICD-10-CM

## 2022-03-22 DIAGNOSIS — E87.6 HYPOKALEMIA: ICD-10-CM

## 2022-03-22 LAB
ALBUMIN SERPL BCP-MCNC: 4.1 G/DL (ref 3.5–5)
ALP SERPL-CCNC: 76 U/L (ref 46–116)
ALT SERPL W P-5'-P-CCNC: 58 U/L (ref 12–78)
ANION GAP SERPL CALCULATED.3IONS-SCNC: 1 MMOL/L (ref 4–13)
AST SERPL W P-5'-P-CCNC: 48 U/L (ref 5–45)
BASOPHILS # BLD AUTO: 0.04 THOUSANDS/ΜL (ref 0–0.1)
BASOPHILS NFR BLD AUTO: 1 % (ref 0–1)
BILIRUB SERPL-MCNC: 0.86 MG/DL (ref 0.2–1)
BUN SERPL-MCNC: 11 MG/DL (ref 5–25)
CALCIUM SERPL-MCNC: 9.3 MG/DL (ref 8.3–10.1)
CHLORIDE SERPL-SCNC: 104 MMOL/L (ref 100–108)
CO2 SERPL-SCNC: 32 MMOL/L (ref 21–32)
CREAT SERPL-MCNC: 0.77 MG/DL (ref 0.6–1.3)
EOSINOPHIL # BLD AUTO: 0.17 THOUSAND/ΜL (ref 0–0.61)
EOSINOPHIL NFR BLD AUTO: 2 % (ref 0–6)
ERYTHROCYTE [DISTWIDTH] IN BLOOD BY AUTOMATED COUNT: 13.7 % (ref 11.6–15.1)
GFR SERPL CREATININE-BSD FRML MDRD: 83 ML/MIN/1.73SQ M
GLUCOSE P FAST SERPL-MCNC: 92 MG/DL (ref 65–99)
HCT VFR BLD AUTO: 45 % (ref 34.8–46.1)
HGB BLD-MCNC: 14.6 G/DL (ref 11.5–15.4)
IMM GRANULOCYTES # BLD AUTO: 0.03 THOUSAND/UL (ref 0–0.2)
IMM GRANULOCYTES NFR BLD AUTO: 0 % (ref 0–2)
LYMPHOCYTES # BLD AUTO: 3.06 THOUSANDS/ΜL (ref 0.6–4.47)
LYMPHOCYTES NFR BLD AUTO: 37 % (ref 14–44)
MCH RBC QN AUTO: 30.7 PG (ref 26.8–34.3)
MCHC RBC AUTO-ENTMCNC: 32.4 G/DL (ref 31.4–37.4)
MCV RBC AUTO: 95 FL (ref 82–98)
MONOCYTES # BLD AUTO: 0.8 THOUSAND/ΜL (ref 0.17–1.22)
MONOCYTES NFR BLD AUTO: 10 % (ref 4–12)
NEUTROPHILS # BLD AUTO: 4.25 THOUSANDS/ΜL (ref 1.85–7.62)
NEUTS SEG NFR BLD AUTO: 50 % (ref 43–75)
NRBC BLD AUTO-RTO: 0 /100 WBCS
PLATELET # BLD AUTO: 223 THOUSANDS/UL (ref 149–390)
PMV BLD AUTO: 10.5 FL (ref 8.9–12.7)
POTASSIUM SERPL-SCNC: 3.3 MMOL/L (ref 3.5–5.3)
PROT SERPL-MCNC: 7.8 G/DL (ref 6.4–8.2)
RBC # BLD AUTO: 4.75 MILLION/UL (ref 3.81–5.12)
SODIUM SERPL-SCNC: 137 MMOL/L (ref 136–145)
WBC # BLD AUTO: 8.35 THOUSAND/UL (ref 4.31–10.16)

## 2022-03-22 PROCEDURE — 82172 ASSAY OF APOLIPOPROTEIN: CPT

## 2022-03-22 PROCEDURE — 83883 ASSAY NEPHELOMETRY NOT SPEC: CPT

## 2022-03-22 PROCEDURE — 84460 ALANINE AMINO (ALT) (SGPT): CPT

## 2022-03-22 PROCEDURE — 86803 HEPATITIS C AB TEST: CPT

## 2022-03-22 PROCEDURE — 82977 ASSAY OF GGT: CPT

## 2022-03-22 PROCEDURE — 83010 ASSAY OF HAPTOGLOBIN QUANT: CPT

## 2022-03-22 PROCEDURE — 99214 OFFICE O/P EST MOD 30 MIN: CPT | Performed by: INTERNAL MEDICINE

## 2022-03-22 PROCEDURE — 86705 HEP B CORE ANTIBODY IGM: CPT

## 2022-03-22 PROCEDURE — 87902 NFCT AGT GNTYP ALYS HEP C: CPT

## 2022-03-22 PROCEDURE — 36415 COLL VENOUS BLD VENIPUNCTURE: CPT

## 2022-03-22 PROCEDURE — 87522 HEPATITIS C REVRS TRNSCRPJ: CPT

## 2022-03-22 PROCEDURE — 86704 HEP B CORE ANTIBODY TOTAL: CPT

## 2022-03-22 PROCEDURE — 80053 COMPREHEN METABOLIC PANEL: CPT

## 2022-03-22 PROCEDURE — 87340 HEPATITIS B SURFACE AG IA: CPT

## 2022-03-22 PROCEDURE — 82247 BILIRUBIN TOTAL: CPT

## 2022-03-22 PROCEDURE — 85025 COMPLETE CBC W/AUTO DIFF WBC: CPT

## 2022-03-22 NOTE — PROGRESS NOTES
Follow-up Note -  [facility]  Fior Tapia 1960 58 y o  female     ASSESSMENT @ PLAN:   70-year-old female with hepatitis C newly diagnosed unknown genotype treatment naive who is here for discussion of treatment  1 will send for hepatitis C lab testing for treatment  2 we briefly reviewed current treatment regimens and the risks and benefits    3 will continue on her pantoprazole which is markedly improved her hiccups and dysphagia    She had endoscopy colonoscopy with me on September 27, 2021 that showed LA grade reflux esophagitis mild nonerosive gastritis and a normal colon with no polyps    Reason:  Hepatitis C    HPI:  She is a 70-year-old female with hepatitis C who is here for evaluation of treatment  She has never had a virus level or genotype done  She was neatly diagnosed  She reports that she has drug and alcohol free  Her AST was recently 50 her ALT was normal   She had endoscopy colonoscopy with me on September 27, 2021  The endoscopy showed LA grade a reflux esophagitis mild nonerosive gastritis and the colonoscopy was normal   She has a remote history of polyps 15 years ago  Her uncle had cirrhosis of the liver secondary to alcohol  REVIEW OF SYSTEMS:     CONSTITUTIONAL: Denies any fever, chills, or rigors  Good appetite, and no recent weight loss  HEENT: No earache or tinnitus  Denies hearing loss or visual disturbances  CARDIOVASCULAR: No chest pain or palpitations  RESPIRATORY: Denies any cough, hemoptysis, shortness of breath or dyspnea on exertion  GASTROINTESTINAL: As noted in the History of Present Illness  GENITOURINARY: No problems with urination  Denies any hematuria or dysuria  NEUROLOGIC: No dizziness or vertigo, denies headaches  MUSCULOSKELETAL: Denies any muscle or joint pain  SKIN: Denies skin rashes or itching  ENDOCRINE: Denies excessive thirst  Denies intolerance to heat or cold  PSYCHOSOCIAL: Denies depression or anxiety  Denies any recent memory loss  Past Medical History:   Diagnosis Date    Abnormal ECG     Denial     Heart aneurysm     Heart murmur     Hypertension     Hypothyroidism     Infectious viral hepatitis     hep c    PAD (peripheral artery disease) (HCC)       Past Surgical History:   Procedure Laterality Date    AUGMENTATION MAMMAPLASTY       SECTION      COLONOSCOPY  2021    ENDOSCOPY OF POUCH  2021     Social History     Socioeconomic History    Marital status:      Spouse name: Not on file    Number of children: Not on file    Years of education: Not on file    Highest education level: Not on file   Occupational History    Not on file   Tobacco Use    Smoking status: Current Every Day Smoker     Packs/day: 1 00     Years: 20 00     Pack years: 20      Types: Cigarettes    Smokeless tobacco: Never Used   Vaping Use    Vaping Use: Never used   Substance and Sexual Activity    Alcohol use:  Yes     Alcohol/week: 2 0 standard drinks     Types: 1 Glasses of wine, 1 Cans of beer per week    Drug use: No    Sexual activity: Yes     Partners: Male   Other Topics Concern    Not on file   Social History Narrative    Not on file     Social Determinants of Health     Financial Resource Strain: Not on file   Food Insecurity: Not on file   Transportation Needs: Not on file   Physical Activity: Inactive    Days of Exercise per Week: 0 days    Minutes of Exercise per Session: 0 min   Stress: Stress Concern Present    Feeling of Stress : Rather much   Social Connections: Not on file   Intimate Partner Violence: Not on file   Housing Stability: Not on file     Family History   Problem Relation Age of Onset    Coronary artery disease Father     Other Father         Denial    Other Mother         Denial    Vaginal cancer Mother 66    Celiac disease Son     Celiac disease Son      Penicillins  Current Outpatient Medications   Medication Sig Dispense Refill    albuterol (PROVENTIL HFA,VENTOLIN HFA) 90 mcg/act inhaler TAKE 2 PUFFS BY MOUTH EVERY 6 HOURS AS NEEDED FOR WHEEZE 18 g 3    aspirin 81 MG tablet Take 1 tablet by mouth daily      atorvastatin (LIPITOR) 20 mg tablet TAKE 1 TABLET BY MOUTH EVERY DAY 90 tablet 2    DULoxetine (CYMBALTA) 60 mg delayed release capsule Take 1 capsule (60 mg total) by mouth daily 90 capsule 2    gabapentin (NEURONTIN) 400 mg capsule Take 400 mg by mouth 2 (two) times a day      hydrochlorothiazide (HYDRODIURIL) 12 5 mg tablet Take 1 tablet (12 5 mg total) by mouth daily 90 tablet 2    ibuprofen (MOTRIN) 800 mg tablet Take 800 mg by mouth 3 (three) times a day as needed      Klor-Con M20 20 MEQ tablet TAKE 1 TABLET BY MOUTH EVERY DAY 30 tablet 5    levothyroxine 150 mcg tablet TAKE 1 TABLET BY MOUTH EVERY DAY 90 tablet 1    lisinopril (ZESTRIL) 10 mg tablet Take 1 tablet (10 mg total) by mouth daily 90 tablet 3    methocarbamol (ROBAXIN) 750 mg tablet TAKE 1 TABLET BY MOUTH FOUR TIMES A DAY AS NEEDED      pantoprazole (PROTONIX) 40 mg tablet TAKE 1 TABLET BY MOUTH EVERY DAY 30 tablet 2    pseudoephedrine (SUDAFED) 30 mg tablet Take 60 mg by mouth every 4 (four) hours as needed for congestion      SUBOXONE 8-2 MG USE 2 & 1/2 FILMS UNDER TONGUE DAILY  0     No current facility-administered medications for this visit  Blood pressure 148/90, pulse 66, height 5' 5" (1 651 m), weight 78 5 kg (173 lb)  PHYSICAL EXAM:     General Appearance:   Alert, cooperative, no distress, appears stated age    HEENT:   Normocephalic, atraumatic, anicteric      Neck:  Supple, symmetrical, trachea midline, no adenopathy;    thyroid: no enlargement/tenderness/nodules; no carotid  bruit or JVD    Lungs:   Clear to auscultation bilaterally; no rales, rhonchi or wheezing; respirations unlabored    Heart[de-identified]   S1 and S2 normal; regular rate and rhythm; no murmur, rub, or gallop     Abdomen:   Soft, non-tender, non-distended; normal bowel sounds; no masses, no organomegaly    Genitalia:   Deferred    Rectal:   Deferred    Extremities:  No cyanosis, clubbing or edema    Pulses:  2+ and symmetric all extremities    Skin:  Skin color, texture, turgor normal, no rashes or lesions    Lymph nodes:  No palpable cervical, axillary or inguinal lymphadenopathy        Lab Results   Component Value Date    WBC 7 41 02/21/2022    HGB 15 2 02/21/2022    HCT 44 7 02/21/2022    MCV 90 02/21/2022     02/21/2022     Lab Results   Component Value Date    CALCIUM 9 4 02/21/2022    K 4 0 02/21/2022    CO2 29 02/21/2022     02/21/2022    BUN 17 02/21/2022    CREATININE 0 57 (L) 02/21/2022     Lab Results   Component Value Date    ALT 65 02/21/2022    AST 50 (H) 02/21/2022    ALKPHOS 66 02/21/2022     No results found for: INR, PROTIME

## 2022-03-23 LAB
HBV CORE AB SER QL: ABNORMAL
HBV CORE IGM SER QL: ABNORMAL
HBV SURFACE AG SER QL: ABNORMAL
HCV AB SER QL: ABNORMAL
HCV RNA SERPL NAA+PROBE-ACNC: NORMAL IU/ML
HCV RNA SERPL NAA+PROBE-LOG IU: 5.96 LOG10 IU/ML
TEST INFORMATION: NORMAL

## 2022-03-25 LAB
A2 MACROGLOB SERPL-MCNC: 263 MG/DL (ref 110–276)
ALT SERPL W P-5'-P-CCNC: 50 IU/L (ref 0–40)
APO A-I SERPL-MCNC: 157 MG/DL (ref 116–209)
BILIRUB SERPL-MCNC: 0.5 MG/DL (ref 0–1.2)
COMMENT: ABNORMAL
FIBROSIS SCORING:: ABNORMAL
FIBROSIS STAGE SERPL QL: ABNORMAL
GGT SERPL-CCNC: 55 IU/L (ref 0–60)
HAPTOGLOB SERPL-MCNC: 121 MG/DL (ref 37–355)
HCV GENTYP SERPL NAA+PROBE: NORMAL
HCV PLEASE NOTE: NORMAL
INTERPRETATIONS: ABNORMAL
LIVER FIBR SCORE SERPL CALC.FIBROSURE: 0.38 (ref 0–0.21)
NECROINFLAMM ACTIVITY SCORING:: ABNORMAL
NECROINFLAMMATORY ACT GRADE SERPL QL: ABNORMAL
NECROINFLAMMATORY ACT SCORE SERPL: 0.31 (ref 0–0.17)
SERVICE CMNT-IMP: ABNORMAL

## 2022-03-29 DIAGNOSIS — B18.2 CHRONIC HEPATITIS C WITHOUT HEPATIC COMA (HCC): Primary | ICD-10-CM

## 2022-03-29 NOTE — TELEPHONE ENCOUNTER
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

## 2022-04-01 NOTE — TELEPHONE ENCOUNTER
Spoke with patient  Perform Specialty to fill patient mavyret x 8 weeks  Patient is aware to let us know when she starts therapy

## 2022-04-06 ENCOUNTER — TELEPHONE (OUTPATIENT)
Dept: GASTROENTEROLOGY | Facility: CLINIC | Age: 62
End: 2022-04-06

## 2022-04-06 NOTE — TELEPHONE ENCOUNTER
Jeyson Rubio from [facility] phoned     She has been trying to reach this patient so that her medication can be delivered  Maya ZHAO today for her to phone Jeyson Leahy

## 2022-04-28 DIAGNOSIS — K21.9 GASTROESOPHAGEAL REFLUX DISEASE WITHOUT ESOPHAGITIS: ICD-10-CM

## 2022-04-28 DIAGNOSIS — R13.19 ESOPHAGEAL DYSPHAGIA: ICD-10-CM

## 2022-04-28 DIAGNOSIS — F33.41 RECURRENT MAJOR DEPRESSIVE DISORDER, IN PARTIAL REMISSION (HCC): ICD-10-CM

## 2022-04-28 RX ORDER — DULOXETIN HYDROCHLORIDE 60 MG/1
CAPSULE, DELAYED RELEASE ORAL
Qty: 90 CAPSULE | Refills: 2 | Status: SHIPPED | OUTPATIENT
Start: 2022-04-28

## 2022-04-28 RX ORDER — PANTOPRAZOLE SODIUM 40 MG/1
TABLET, DELAYED RELEASE ORAL
Qty: 30 TABLET | Refills: 2 | Status: SHIPPED | OUTPATIENT
Start: 2022-04-28 | End: 2022-06-26

## 2022-05-27 ENCOUNTER — TELEPHONE (OUTPATIENT)
Dept: GASTROENTEROLOGY | Facility: CLINIC | Age: 62
End: 2022-05-27

## 2022-05-27 DIAGNOSIS — E03.9 ACQUIRED HYPOTHYROIDISM: ICD-10-CM

## 2022-05-27 NOTE — TELEPHONE ENCOUNTER
Called pt and left message on machine that we need to know when she started her Hepatitis C medication to determine when she needs bloodwork  Advised her to call back and let us know and then we can order bloodwork accordingly

## 2022-05-28 RX ORDER — LEVOTHYROXINE SODIUM 0.15 MG/1
TABLET ORAL
Qty: 90 TABLET | Refills: 1 | Status: SHIPPED | OUTPATIENT
Start: 2022-05-28

## 2022-06-01 NOTE — TELEPHONE ENCOUNTER
Codi - patient called lmom patient started her medication 7 weeks ago  Blood work was not done on week 4  Patient did not know she needed to have blood work   Please call Beronica Baron at [phone] ty

## 2022-06-10 ENCOUNTER — TELEPHONE (OUTPATIENT)
Dept: GASTROENTEROLOGY | Facility: CLINIC | Age: 62
End: 2022-06-10

## 2022-06-10 NOTE — TELEPHONE ENCOUNTER
Carl patient - Patient LMOM to FAX something , RTRN'd call , at least 3/4 of call was not understandable as it cut out  LMOM to have patient RTRN call to get clarification    Thx

## 2022-06-24 DIAGNOSIS — E87.6 HYPOKALEMIA: ICD-10-CM

## 2022-06-24 RX ORDER — POTASSIUM CHLORIDE 1500 MG/1
TABLET, EXTENDED RELEASE ORAL
Qty: 30 TABLET | Refills: 5 | Status: SHIPPED | OUTPATIENT
Start: 2022-06-24 | End: 2022-07-17

## 2022-06-25 DIAGNOSIS — I10 ESSENTIAL HYPERTENSION: ICD-10-CM

## 2022-06-26 DIAGNOSIS — K21.9 GASTROESOPHAGEAL REFLUX DISEASE WITHOUT ESOPHAGITIS: ICD-10-CM

## 2022-06-26 DIAGNOSIS — R13.19 ESOPHAGEAL DYSPHAGIA: ICD-10-CM

## 2022-06-26 RX ORDER — PANTOPRAZOLE SODIUM 40 MG/1
TABLET, DELAYED RELEASE ORAL
Qty: 30 TABLET | Refills: 2 | Status: SHIPPED | OUTPATIENT
Start: 2022-06-26

## 2022-06-27 RX ORDER — HYDROCHLOROTHIAZIDE 12.5 MG/1
TABLET ORAL
Qty: 90 TABLET | Refills: 2 | Status: SHIPPED | OUTPATIENT
Start: 2022-06-27

## 2022-07-09 ENCOUNTER — APPOINTMENT (OUTPATIENT)
Dept: LAB | Facility: CLINIC | Age: 62
End: 2022-07-09
Payer: COMMERCIAL

## 2022-07-09 DIAGNOSIS — R73.9 HYPERGLYCEMIA: ICD-10-CM

## 2022-07-09 DIAGNOSIS — E78.2 MIXED HYPERLIPIDEMIA: ICD-10-CM

## 2022-07-09 LAB
ALBUMIN SERPL BCP-MCNC: 3.4 G/DL (ref 3.5–5)
ALP SERPL-CCNC: 57 U/L (ref 46–116)
ALT SERPL W P-5'-P-CCNC: 25 U/L (ref 12–78)
ANION GAP SERPL CALCULATED.3IONS-SCNC: 1 MMOL/L (ref 4–13)
AST SERPL W P-5'-P-CCNC: 20 U/L (ref 5–45)
BASOPHILS # BLD AUTO: 0.04 THOUSANDS/ΜL (ref 0–0.1)
BASOPHILS NFR BLD AUTO: 1 % (ref 0–1)
BILIRUB SERPL-MCNC: 1.13 MG/DL (ref 0.2–1)
BUN SERPL-MCNC: 17 MG/DL (ref 5–25)
CALCIUM ALBUM COR SERPL-MCNC: 9.6 MG/DL (ref 8.3–10.1)
CALCIUM SERPL-MCNC: 9.1 MG/DL (ref 8.3–10.1)
CHLORIDE SERPL-SCNC: 109 MMOL/L (ref 100–108)
CHOLEST SERPL-MCNC: 112 MG/DL
CO2 SERPL-SCNC: 32 MMOL/L (ref 21–32)
CREAT SERPL-MCNC: 0.68 MG/DL (ref 0.6–1.3)
EOSINOPHIL # BLD AUTO: 0.18 THOUSAND/ΜL (ref 0–0.61)
EOSINOPHIL NFR BLD AUTO: 2 % (ref 0–6)
ERYTHROCYTE [DISTWIDTH] IN BLOOD BY AUTOMATED COUNT: 13.6 % (ref 11.6–15.1)
EST. AVERAGE GLUCOSE BLD GHB EST-MCNC: 117 MG/DL
GFR SERPL CREATININE-BSD FRML MDRD: 94 ML/MIN/1.73SQ M
GLUCOSE P FAST SERPL-MCNC: 107 MG/DL (ref 65–99)
HBA1C MFR BLD: 5.7 %
HCT VFR BLD AUTO: 45.8 % (ref 34.8–46.1)
HDLC SERPL-MCNC: 56 MG/DL
HGB BLD-MCNC: 15.1 G/DL (ref 11.5–15.4)
IMM GRANULOCYTES # BLD AUTO: 0.03 THOUSAND/UL (ref 0–0.2)
IMM GRANULOCYTES NFR BLD AUTO: 0 % (ref 0–2)
LDLC SERPL CALC-MCNC: 36 MG/DL (ref 0–100)
LYMPHOCYTES # BLD AUTO: 3.02 THOUSANDS/ΜL (ref 0.6–4.47)
LYMPHOCYTES NFR BLD AUTO: 36 % (ref 14–44)
MCH RBC QN AUTO: 31.9 PG (ref 26.8–34.3)
MCHC RBC AUTO-ENTMCNC: 33 G/DL (ref 31.4–37.4)
MCV RBC AUTO: 97 FL (ref 82–98)
MONOCYTES # BLD AUTO: 1.02 THOUSAND/ΜL (ref 0.17–1.22)
MONOCYTES NFR BLD AUTO: 12 % (ref 4–12)
NEUTROPHILS # BLD AUTO: 4.14 THOUSANDS/ΜL (ref 1.85–7.62)
NEUTS SEG NFR BLD AUTO: 49 % (ref 43–75)
NONHDLC SERPL-MCNC: 56 MG/DL
NRBC BLD AUTO-RTO: 0 /100 WBCS
PLATELET # BLD AUTO: 239 THOUSANDS/UL (ref 149–390)
PMV BLD AUTO: 9.9 FL (ref 8.9–12.7)
POTASSIUM SERPL-SCNC: 4.3 MMOL/L (ref 3.5–5.3)
PROT SERPL-MCNC: 7.3 G/DL (ref 6.4–8.2)
RBC # BLD AUTO: 4.74 MILLION/UL (ref 3.81–5.12)
SODIUM SERPL-SCNC: 142 MMOL/L (ref 136–145)
TRIGL SERPL-MCNC: 99 MG/DL
TSH SERPL DL<=0.05 MIU/L-ACNC: 0.52 UIU/ML (ref 0.45–4.5)
WBC # BLD AUTO: 8.43 THOUSAND/UL (ref 4.31–10.16)

## 2022-07-09 PROCEDURE — 80053 COMPREHEN METABOLIC PANEL: CPT

## 2022-07-09 PROCEDURE — 80061 LIPID PANEL: CPT

## 2022-07-09 PROCEDURE — 85025 COMPLETE CBC W/AUTO DIFF WBC: CPT

## 2022-07-09 PROCEDURE — 84443 ASSAY THYROID STIM HORMONE: CPT

## 2022-07-09 PROCEDURE — 36415 COLL VENOUS BLD VENIPUNCTURE: CPT

## 2022-07-09 PROCEDURE — 83036 HEMOGLOBIN GLYCOSYLATED A1C: CPT

## 2022-07-12 ENCOUNTER — VBI (OUTPATIENT)
Dept: ADMINISTRATIVE | Facility: OTHER | Age: 62
End: 2022-07-12

## 2022-07-14 ENCOUNTER — TELEPHONE (OUTPATIENT)
Dept: INTERNAL MEDICINE CLINIC | Facility: CLINIC | Age: 62
End: 2022-07-14

## 2022-07-17 DIAGNOSIS — E87.6 HYPOKALEMIA: ICD-10-CM

## 2022-07-17 RX ORDER — POTASSIUM CHLORIDE 1500 MG/1
TABLET, EXTENDED RELEASE ORAL
Qty: 90 TABLET | Refills: 2 | Status: SHIPPED | OUTPATIENT
Start: 2022-07-17

## 2022-08-16 DIAGNOSIS — R13.19 ESOPHAGEAL DYSPHAGIA: ICD-10-CM

## 2022-08-16 DIAGNOSIS — K21.9 GASTROESOPHAGEAL REFLUX DISEASE WITHOUT ESOPHAGITIS: ICD-10-CM

## 2022-08-16 RX ORDER — PANTOPRAZOLE SODIUM 40 MG/1
TABLET, DELAYED RELEASE ORAL
Qty: 90 TABLET | Refills: 1 | Status: SHIPPED | OUTPATIENT
Start: 2022-08-16

## 2022-09-20 ENCOUNTER — TELEMEDICINE (OUTPATIENT)
Dept: INTERNAL MEDICINE CLINIC | Facility: CLINIC | Age: 62
End: 2022-09-20
Payer: COMMERCIAL

## 2022-09-20 DIAGNOSIS — U07.1 COVID-19: Primary | ICD-10-CM

## 2022-09-20 PROCEDURE — G2012 BRIEF CHECK IN BY MD/QHP: HCPCS | Performed by: INTERNAL MEDICINE

## 2022-09-20 RX ORDER — NIRMATRELVIR AND RITONAVIR 300-100 MG
3 KIT ORAL 2 TIMES DAILY
Qty: 30 TABLET | Refills: 0 | Status: SHIPPED | OUTPATIENT
Start: 2022-09-20 | End: 2022-09-25

## 2022-09-20 NOTE — PROGRESS NOTES
COVID-19 Outpatient Progress Note    Assessment/Plan:    Problem List Items Addressed This Visit    None     Visit Diagnoses     COVID-19    -  Primary         Disposition:     I have spent 20 minutes directly with the patient  Encounter provider: Meera Mckeon MD     Provider located at: [address] [place] [address]     Recent Visits  No visits were found meeting these conditions  Showing recent visits within past 7 days and meeting all other requirements  Today's Visits  Date Type Provider Dept   09/20/22 Telemedicine Meera Mckeon MD [address] [place] [address] today's visits and meeting all other requirements  Future Appointments  No visits were found meeting these conditions  Showing future appointments within next 150 days and meeting all other requirements     This virtual check-in was done via telephone and she agrees to proceed  Patient agrees to participate in a virtual check in via telephone or video visit instead of presenting to the office to address urgent/immediate medical needs  Patient is aware this is a billable service  She acknowledged consent and understanding of privacy and security of the video platform  The patient has agreed to participate and understands they can discontinue the visit at any time  After connecting through Telephone, the patient was identified by name and date of birth  Ilsa Hairston was informed that this was a telemedicine visit and that the exam was being conducted confidentially over secure lines  My office door was closed  No one else was in the room  Ilsa Hairston acknowledged consent and understanding of privacy and security of the telemedicine visit  I informed the patient that I have reviewed her record in Epic and presented the opportunity for her to ask any questions regarding the visit today  The patient agreed to participate      It was my intent to perform this visit via video technology but the patient was not able to do a video connection so the visit was completed via audio telephone only  Verification of patient location:  Patient is located in the following state in which I hold an active license: [place]    Subjective:   Kiersten Diaz is a 58 y o  female who is concerned about COVID-19  Patient's symptoms include chills, fatigue, malaise and nasal congestion  Patient denies fever, rhinorrhea, sore throat, cough, shortness of breath, chest tightness, abdominal pain, nausea, vomiting, diarrhea, myalgias and headaches  - Date of symptom onset: 9/16/2022      COVID-19 vaccination status: Fully vaccinated (primary series)    Exposure:   Contact with a person who is under investigation (PUI) for or who is positive for COVID-19 within the last 14 days?: No    Hospitalized recently for fever and/or lower respiratory symptoms?: No      Currently a healthcare worker that is involved in direct patient care?: No      Works in a special setting where the risk of COVID-19 transmission may be high? (this may include long-term care, correctional and FPC facilities; homeless shelters; assisted-living facilities and group homes ): No      Resident in a special setting where the risk of COVID-19 transmission may be high? (this may include long-term care, correctional and FPC facilities; homeless shelters; assisted-living facilities and group homes ): No      No results found for: John Paul Taylor, [facility], [address] [facility], [facility], [address] [facility], [facility]    Review of Systems   Constitutional: Positive for chills and fatigue  Negative for diaphoresis and fever  HENT: Positive for congestion, ear pain and sinus pressure  Negative for ear discharge, hearing loss, postnasal drip, rhinorrhea, sinus pain, sneezing, sore throat and voice change  Eyes: Negative for pain, discharge, redness and visual disturbance     Respiratory: Negative for cough, chest tightness, shortness of breath and wheezing  Cardiovascular: Negative for chest pain, palpitations and leg swelling  Gastrointestinal: Negative for abdominal distention, abdominal pain, blood in stool, constipation, diarrhea, nausea and vomiting  Endocrine: Negative for cold intolerance, heat intolerance, polydipsia, polyphagia and polyuria  Genitourinary: Negative for dysuria, flank pain, frequency, hematuria and urgency  Musculoskeletal: Negative for arthralgias, back pain, gait problem, joint swelling, myalgias, neck pain and neck stiffness  Skin: Negative for rash  Neurological: Negative for dizziness, tremors, syncope, facial asymmetry, speech difficulty, weakness, light-headedness, numbness and headaches  Hematological: Does not bruise/bleed easily  Psychiatric/Behavioral: Negative for behavioral problems, confusion and sleep disturbance  The patient is not nervous/anxious        Current Outpatient Medications on File Prior to Visit   Medication Sig    albuterol (PROVENTIL HFA,VENTOLIN HFA) 90 mcg/act inhaler TAKE 2 PUFFS BY MOUTH EVERY 6 HOURS AS NEEDED FOR WHEEZE    aspirin 81 MG tablet Take 1 tablet by mouth daily    atorvastatin (LIPITOR) 20 mg tablet TAKE 1 TABLET BY MOUTH EVERY DAY    DULoxetine (CYMBALTA) 60 mg delayed release capsule TAKE 1 CAPSULE BY MOUTH EVERY DAY    gabapentin (NEURONTIN) 400 mg capsule Take 400 mg by mouth 2 (two) times a day    hydrochlorothiazide (HYDRODIURIL) 12 5 mg tablet TAKE 1 TABLET BY MOUTH EVERY DAY    ibuprofen (MOTRIN) 800 mg tablet Take 800 mg by mouth 3 (three) times a day as needed    Klor-Con M20 20 MEQ tablet TAKE 1 TABLET BY MOUTH EVERY DAY    levothyroxine 150 mcg tablet TAKE 1 TABLET BY MOUTH EVERY DAY    lisinopril (ZESTRIL) 10 mg tablet TAKE 1 TABLET BY MOUTH EVERY DAY    methocarbamol (ROBAXIN) 750 mg tablet TAKE 1 TABLET BY MOUTH FOUR TIMES A DAY AS NEEDED    pantoprazole (PROTONIX) 40 mg tablet TAKE 1 TABLET BY MOUTH EVERY DAY  pseudoephedrine (SUDAFED) 30 mg tablet Take 60 mg by mouth every 4 (four) hours as needed for congestion    SUBOXONE 8-2 MG USE 2 & 1/2 FILMS UNDER TONGUE DAILY       Objective: There were no vitals taken for this visit       Physical Exam   Exam could not be done    Meera Mckeon MD

## 2022-10-12 DIAGNOSIS — I10 ESSENTIAL HYPERTENSION: ICD-10-CM

## 2022-10-12 RX ORDER — LISINOPRIL 10 MG/1
TABLET ORAL
Qty: 90 TABLET | Refills: 1 | Status: SHIPPED | OUTPATIENT
Start: 2022-10-12

## 2022-12-13 ENCOUNTER — OFFICE VISIT (OUTPATIENT)
Dept: INTERNAL MEDICINE CLINIC | Facility: CLINIC | Age: 62
End: 2022-12-13

## 2022-12-13 ENCOUNTER — TELEPHONE (OUTPATIENT)
Dept: INTERNAL MEDICINE CLINIC | Facility: CLINIC | Age: 62
End: 2022-12-13

## 2022-12-13 VITALS
WEIGHT: 180.3 LBS | HEIGHT: 65 IN | OXYGEN SATURATION: 98 % | HEART RATE: 72 BPM | BODY MASS INDEX: 30.04 KG/M2 | SYSTOLIC BLOOD PRESSURE: 108 MMHG | DIASTOLIC BLOOD PRESSURE: 78 MMHG

## 2022-12-13 DIAGNOSIS — Z12.4 SCREENING FOR CERVICAL CANCER: ICD-10-CM

## 2022-12-13 DIAGNOSIS — H93.91 EAR PROBLEM, RIGHT: ICD-10-CM

## 2022-12-13 DIAGNOSIS — Z12.31 ENCOUNTER FOR SCREENING MAMMOGRAM FOR BREAST CANCER: ICD-10-CM

## 2022-12-13 DIAGNOSIS — M54.50 CHRONIC BILATERAL LOW BACK PAIN WITHOUT SCIATICA: ICD-10-CM

## 2022-12-13 DIAGNOSIS — Z23 ENCOUNTER FOR IMMUNIZATION: ICD-10-CM

## 2022-12-13 DIAGNOSIS — R21 RASH: ICD-10-CM

## 2022-12-13 DIAGNOSIS — G89.29 CHRONIC BILATERAL LOW BACK PAIN WITHOUT SCIATICA: ICD-10-CM

## 2022-12-13 DIAGNOSIS — M75.41 IMPINGEMENT SYNDROME OF RIGHT SHOULDER: Primary | ICD-10-CM

## 2022-12-13 DIAGNOSIS — F17.210 SMOKING GREATER THAN 20 PACK YEARS: ICD-10-CM

## 2022-12-13 RX ORDER — AMMONIUM LACTATE 12 G/100G
LOTION TOPICAL 2 TIMES DAILY PRN
Qty: 400 G | Refills: 0 | Status: SHIPPED | OUTPATIENT
Start: 2022-12-13

## 2022-12-13 RX ORDER — DIAPER,BRIEF,INFANT-TODD,DISP
EACH MISCELLANEOUS 4 TIMES DAILY PRN
Qty: 30 G | Refills: 0 | Status: SHIPPED | OUTPATIENT
Start: 2022-12-13

## 2022-12-13 NOTE — ASSESSMENT & PLAN NOTE
This is not a new problem, however patient reports having increased pain over the past week  He could benefit from physical therapy

## 2022-12-13 NOTE — TELEPHONE ENCOUNTER
Patient seen today by Gerardo Alejandro    Will be scheduling an appt w/ Dr Katelin Diaz and wanted to see if labs are needed   [phone]

## 2022-12-13 NOTE — ASSESSMENT & PLAN NOTE
Shoulder injury with elevation 3 months ago  Positive painful arc test tenderness of the lateral deltoid and right scapula  Neg Paresthesia or weakness  Patient is inquiring about cortisone injection

## 2022-12-13 NOTE — PROGRESS NOTES
Name: Hua Bullard      : 1960      MRN: [id number]  Encounter Provider: BONIFACIO Kim  Encounter Date: 2022   [facility]: [facility]   [address]     1  Impingement syndrome of right shoulder  Assessment & Plan:  Shoulder injury with elevation 3 months ago  Positive painful arc test tenderness of the lateral deltoid and right scapula  Neg Paresthesia or weakness  Patient is inquiring about cortisone injection  Orders:  -     Ambulatory Referral to Orthopedic Surgery; Future  -     Ambulatory Referral to Physical Therapy; Future  -     XR shoulder 2+ vw right; Future; Expected date: 2022    2  Rash  -     ammonium lactate (LAC-HYDRIN) 12 % lotion; Apply topically 2 (two) times a day as needed for dry skin  -     hydrocortisone 1 % cream; Apply topically 4 (four) times a day as needed for rash    3  Ear problem, right  Comments:  Abnormal sensation "numbness" ear Auricle & canal x 1 month  negative tinnitus, hearing loss or infection  Orders:  -     Ambulatory Referral to Otolaryngology; Future    4  Chronic bilateral low back pain without sciatica  Assessment & Plan: This is not a new problem, however patient reports having increased pain over the past week  He could benefit from physical therapy  Orders:  -     Ambulatory Referral to Orthopedic Surgery; Future    5  Smoking greater than 20 pack years  Comments:  Patient declined CT lung screen    6  Screening for cervical cancer  Comments:  Overdue for cervical cancer screening and mammogram  Orders:  -     Ambulatory Referral to Obstetrics / Gynecology; Future    7  Encounter for screening mammogram for breast cancer  -     Mammo screening bilateral w 3d & cad; Future; Expected date: 2023    8   Encounter for immunization  -     influenza vaccine, quadrivalent, recombinant, PF, 0 5 mL, for patients 18 yr+ (FLUBLOK)       She will follow up as needed  Subjective Jacky Moffett is here for evaluation of right shoulder and left hip pain  She reported a right shoulder injury  3 months ago while reaching with her arm above her head she heard a snap followed by pain  She did not seek medical attention at that time  She treated her injury with ibuprofen, rest and ice  Since then she has intermittent pain aggravated by overuse and activity  She is seeking medical attention today to discuss more long-term solution  Patient also reports left hip pain that radiates down her thigh to her knee  She describes the pain as constant aching and 6/10  She denies numbness, tingling, loss of bowel or bladder  Shoulder Pain   The pain is present in the right shoulder  This is a chronic problem  The current episode started more than 1 month ago  There has been a history of trauma  The problem occurs constantly  The problem has been waxing and waning  The pain is at a severity of 8/10  The pain is moderate  Associated symptoms include an inability to bear weight  Pertinent negatives include no joint swelling, numbness, stiffness or tingling  The symptoms are aggravated by activity  She has tried NSAIDS, heat and cold for the symptoms  The treatment provided mild relief  Family history includes rheumatoid arthritis  There is no history of diabetes  Hip Pain   The incident occurred more than 1 week ago  The incident occurred at home  There was no injury mechanism  The pain is present in the left hip  The quality of the pain is described as aching and burning  The pain is at a severity of 6/10  The pain is mild  The pain has been improving since onset  Associated symptoms include an inability to bear weight  Pertinent negatives include no numbness or tingling  She reports no foreign bodies present  The symptoms are aggravated by movement and weight bearing  She has tried NSAIDs for the symptoms  The treatment provided mild relief  Review of Systems   Constitutional: Negative      HENT: Positive for ear pain  Negative for ear discharge  Eyes: Negative  Negative for pain  Respiratory: Negative  Cardiovascular: Negative  Gastrointestinal: Negative  Endocrine: Negative  Genitourinary: Negative  Musculoskeletal: Positive for arthralgias and back pain  Negative for joint swelling, neck pain, neck stiffness and stiffness  Skin: Positive for rash  Neurological: Negative for tingling, weakness and numbness  Psychiatric/Behavioral: Negative for confusion         Current Outpatient Medications on File Prior to Visit   Medication Sig   • albuterol (PROVENTIL HFA,VENTOLIN HFA) 90 mcg/act inhaler TAKE 2 PUFFS BY MOUTH EVERY 6 HOURS AS NEEDED FOR WHEEZE   • aspirin 81 MG tablet Take 1 tablet by mouth daily   • atorvastatin (LIPITOR) 20 mg tablet TAKE 1 TABLET BY MOUTH EVERY DAY   • DULoxetine (CYMBALTA) 60 mg delayed release capsule TAKE 1 CAPSULE BY MOUTH EVERY DAY   • gabapentin (NEURONTIN) 400 mg capsule Take 400 mg by mouth 2 (two) times a day   • hydrochlorothiazide (HYDRODIURIL) 12 5 mg tablet TAKE 1 TABLET BY MOUTH EVERY DAY   • ibuprofen (MOTRIN) 800 mg tablet Take 800 mg by mouth 3 (three) times a day as needed   • Klor-Con M20 20 MEQ tablet TAKE 1 TABLET BY MOUTH EVERY DAY   • levothyroxine 150 mcg tablet TAKE 1 TABLET BY MOUTH EVERY DAY   • lisinopril (ZESTRIL) 10 mg tablet TAKE 1 TABLET BY MOUTH EVERY DAY   • methocarbamol (ROBAXIN) 750 mg tablet TAKE 1 TABLET BY MOUTH FOUR TIMES A DAY AS NEEDED   • pantoprazole (PROTONIX) 40 mg tablet TAKE 1 TABLET BY MOUTH EVERY DAY   • SUBOXONE 8-2 MG USE 2 & 1/2 FILMS UNDER TONGUE DAILY   • pseudoephedrine (SUDAFED) 30 mg tablet Take 60 mg by mouth every 4 (four) hours as needed for congestion (Patient not taking: Reported on 12/13/2022)       Objective     /78 (BP Location: Left arm, Patient Position: Sitting, Cuff Size: Standard)   Pulse 72   Ht 5' 5" (1 651 m)   Wt 81 8 kg (180 lb 4 8 oz)   SpO2 98%   BMI 30 00 kg/m² Physical Exam  Vitals and nursing note reviewed  Constitutional:       Appearance: Normal appearance  HENT:      Head: Normocephalic and atraumatic  Right Ear: Tympanic membrane, ear canal and external ear normal       Left Ear: Tympanic membrane, ear canal and external ear normal    Eyes:      Conjunctiva/sclera: Conjunctivae normal       Pupils: Pupils are equal, round, and reactive to light  Cardiovascular:      Rate and Rhythm: Normal rate and regular rhythm  Pulses: Normal pulses  Heart sounds: Normal heart sounds  Pulmonary:      Effort: Pulmonary effort is normal       Breath sounds: Normal breath sounds  Musculoskeletal:         General: Tenderness present  No swelling  Right shoulder: Tenderness present  No effusion or crepitus  Decreased range of motion  Normal strength  Left shoulder: Normal       Cervical back: Normal, normal range of motion and neck supple  No rigidity or tenderness  Thoracic back: Normal       Lumbar back: Normal       Right hip: Normal       Left hip: Tenderness present  No crepitus  Normal range of motion  Normal strength  Skin:     General: Skin is dry  Findings: Rash present  Neurological:      General: No focal deficit present  Mental Status: She is alert and oriented to person, place, and time  Motor: No weakness        Gait: Gait normal    Psychiatric:         Mood and Affect: Mood normal        BONIFACIO Hammond

## 2022-12-16 DIAGNOSIS — E78.2 MIXED HYPERLIPIDEMIA: ICD-10-CM

## 2022-12-16 RX ORDER — ATORVASTATIN CALCIUM 20 MG/1
TABLET, FILM COATED ORAL
Qty: 90 TABLET | Refills: 2 | Status: SHIPPED | OUTPATIENT
Start: 2022-12-16

## 2022-12-19 DIAGNOSIS — I10 ESSENTIAL HYPERTENSION: Primary | ICD-10-CM

## 2022-12-19 DIAGNOSIS — R73.9 HYPERGLYCEMIA: ICD-10-CM

## 2022-12-22 ENCOUNTER — VBI (OUTPATIENT)
Dept: ADMINISTRATIVE | Facility: OTHER | Age: 62
End: 2022-12-22

## 2022-12-29 ENCOUNTER — TELEPHONE (OUTPATIENT)
Dept: INTERNAL MEDICINE CLINIC | Facility: CLINIC | Age: 62
End: 2022-12-29

## 2022-12-29 ENCOUNTER — APPOINTMENT (OUTPATIENT)
Dept: LAB | Facility: CLINIC | Age: 62
End: 2022-12-29

## 2022-12-29 DIAGNOSIS — R73.9 HYPERGLYCEMIA: ICD-10-CM

## 2022-12-29 DIAGNOSIS — I10 ESSENTIAL HYPERTENSION: ICD-10-CM

## 2022-12-29 DIAGNOSIS — E03.9 ACQUIRED HYPOTHYROIDISM: ICD-10-CM

## 2022-12-29 LAB
ALBUMIN SERPL BCP-MCNC: 3.9 G/DL (ref 3.5–5)
ALP SERPL-CCNC: 56 U/L (ref 46–116)
ALT SERPL W P-5'-P-CCNC: 23 U/L (ref 12–78)
ANION GAP SERPL CALCULATED.3IONS-SCNC: 3 MMOL/L (ref 4–13)
AST SERPL W P-5'-P-CCNC: 22 U/L (ref 5–45)
BASOPHILS # BLD AUTO: 0.05 THOUSANDS/ÂΜL (ref 0–0.1)
BASOPHILS NFR BLD AUTO: 1 % (ref 0–1)
BILIRUB SERPL-MCNC: 1.02 MG/DL (ref 0.2–1)
BUN SERPL-MCNC: 14 MG/DL (ref 5–25)
CALCIUM SERPL-MCNC: 9.5 MG/DL (ref 8.3–10.1)
CHLORIDE SERPL-SCNC: 106 MMOL/L (ref 96–108)
CHOLEST SERPL-MCNC: 147 MG/DL
CO2 SERPL-SCNC: 32 MMOL/L (ref 21–32)
CREAT SERPL-MCNC: 0.74 MG/DL (ref 0.6–1.3)
EOSINOPHIL # BLD AUTO: 0.16 THOUSAND/ÂΜL (ref 0–0.61)
EOSINOPHIL NFR BLD AUTO: 2 % (ref 0–6)
ERYTHROCYTE [DISTWIDTH] IN BLOOD BY AUTOMATED COUNT: 13.6 % (ref 11.6–15.1)
EST. AVERAGE GLUCOSE BLD GHB EST-MCNC: 117 MG/DL
GFR SERPL CREATININE-BSD FRML MDRD: 87 ML/MIN/1.73SQ M
GLUCOSE P FAST SERPL-MCNC: 102 MG/DL (ref 65–99)
HBA1C MFR BLD: 5.7 %
HCT VFR BLD AUTO: 47.3 % (ref 34.8–46.1)
HDLC SERPL-MCNC: 52 MG/DL
HGB BLD-MCNC: 15.1 G/DL (ref 11.5–15.4)
IMM GRANULOCYTES # BLD AUTO: 0.03 THOUSAND/UL (ref 0–0.2)
IMM GRANULOCYTES NFR BLD AUTO: 0 % (ref 0–2)
LDLC SERPL CALC-MCNC: 57 MG/DL (ref 0–100)
LYMPHOCYTES # BLD AUTO: 2.82 THOUSANDS/ÂΜL (ref 0.6–4.47)
LYMPHOCYTES NFR BLD AUTO: 33 % (ref 14–44)
MCH RBC QN AUTO: 31.2 PG (ref 26.8–34.3)
MCHC RBC AUTO-ENTMCNC: 31.9 G/DL (ref 31.4–37.4)
MCV RBC AUTO: 98 FL (ref 82–98)
MONOCYTES # BLD AUTO: 0.96 THOUSAND/ÂΜL (ref 0.17–1.22)
MONOCYTES NFR BLD AUTO: 11 % (ref 4–12)
NEUTROPHILS # BLD AUTO: 4.58 THOUSANDS/ÂΜL (ref 1.85–7.62)
NEUTS SEG NFR BLD AUTO: 53 % (ref 43–75)
NONHDLC SERPL-MCNC: 95 MG/DL
NRBC BLD AUTO-RTO: 0 /100 WBCS
PLATELET # BLD AUTO: 253 THOUSANDS/UL (ref 149–390)
PMV BLD AUTO: 10.2 FL (ref 8.9–12.7)
POTASSIUM SERPL-SCNC: 4 MMOL/L (ref 3.5–5.3)
PROT SERPL-MCNC: 8 G/DL (ref 6.4–8.4)
RBC # BLD AUTO: 4.84 MILLION/UL (ref 3.81–5.12)
SODIUM SERPL-SCNC: 141 MMOL/L (ref 135–147)
TRIGL SERPL-MCNC: 189 MG/DL
TSH SERPL DL<=0.05 MIU/L-ACNC: 9.53 UIU/ML (ref 0.45–4.5)
WBC # BLD AUTO: 8.6 THOUSAND/UL (ref 4.31–10.16)

## 2022-12-29 RX ORDER — LEVOTHYROXINE SODIUM 175 UG/1
175 TABLET ORAL DAILY
Qty: 90 TABLET | Refills: 1 | Status: SHIPPED | OUTPATIENT
Start: 2022-12-29

## 2022-12-29 NOTE — TELEPHONE ENCOUNTER
----- Message from Asuncion Braun MD sent at 12/29/2022  5:17 PM EST -----   Please  the new dose of levothyroxine from the pharmacy and start taking it from tomorrow morning  ----- Message -----  From: Lab, Background User  Sent: 12/29/2022   2:29 PM EST  To: Asuncion Braun MD

## 2023-01-13 DIAGNOSIS — F33.41 RECURRENT MAJOR DEPRESSIVE DISORDER, IN PARTIAL REMISSION (HCC): ICD-10-CM

## 2023-01-13 RX ORDER — DULOXETIN HYDROCHLORIDE 60 MG/1
CAPSULE, DELAYED RELEASE ORAL
Qty: 30 CAPSULE | Refills: 8 | Status: SHIPPED | OUTPATIENT
Start: 2023-01-13

## 2023-01-24 ENCOUNTER — OFFICE VISIT (OUTPATIENT)
Dept: OBGYN CLINIC | Age: 63
End: 2023-01-24

## 2023-01-24 VITALS
WEIGHT: 183.4 LBS | DIASTOLIC BLOOD PRESSURE: 80 MMHG | BODY MASS INDEX: 30.56 KG/M2 | HEIGHT: 65 IN | SYSTOLIC BLOOD PRESSURE: 122 MMHG

## 2023-01-24 DIAGNOSIS — Z12.4 SCREENING FOR CERVICAL CANCER: ICD-10-CM

## 2023-01-24 DIAGNOSIS — Z11.3 SCREEN FOR STD (SEXUALLY TRANSMITTED DISEASE): ICD-10-CM

## 2023-01-24 DIAGNOSIS — Z12.31 ENCOUNTER FOR SCREENING MAMMOGRAM FOR MALIGNANT NEOPLASM OF BREAST: ICD-10-CM

## 2023-01-24 DIAGNOSIS — Z01.419 ENCOUNTER FOR GYNECOLOGICAL EXAMINATION (GENERAL) (ROUTINE) WITHOUT ABNORMAL FINDINGS: Primary | ICD-10-CM

## 2023-01-24 NOTE — PROGRESS NOTES
Aline Lamb   1960    CC:  Yearly exam    A:  Yearly exam      Problem List Items Addressed This Visit    None  Visit Diagnoses     Encounter for gynecological examination (general) (routine) without abnormal findings    -  Primary    Relevant Orders    Liquid-based pap, screening    Encounter for screening mammogram for malignant neoplasm of breast        Screening for cervical cancer        Overdue for cervical cancer screening and mammogram    Screen for STD (sexually transmitted disease)        Relevant Orders    Chlamydia/GC amplified DNA by PCR    Trichomonas Vaginalis, FLORY          P:   Pap collected today  We reviewed [facility]P guidelines for Pap testing  Mammo slip given   Colonoscopy due    ELOY: recommend Kegel exercised, discussed and handout given  RTO if not improved w 3mo of exercises for discussion regarding surgical intervention    RTO one year for yearly exam or sooner as needed  S:  61 y o  female here for yearly exam  She is postmenopausal and has had no vaginal bleeding  She denies vaginal discharge, itching, odor or dryness  Sexual activity: She is not sexually active as her fiancee is unable, but they do external stimulation without pain, bleeding or dryness  STD testing: She does not want STD testing today  Menopausal    Last Pap: 10/2017 NILM   - history HPV ?  Last Mammo: 2021 BIRADS 2  Last Colonoscopy: 2021 x5yr recall     Current daily smoker, social drinker  Exercises irregularly    Family hx of breast cancer: Mat   Aunt   Family hx of ovarian cancer: denies   Family hx of colon cancer: denies      Current Outpatient Medications:   •  albuterol (PROVENTIL HFA,VENTOLIN HFA) 90 mcg/act inhaler, TAKE 2 PUFFS BY MOUTH EVERY 6 HOURS AS NEEDED FOR WHEEZE, Disp: 18 g, Rfl: 3  •  ammonium lactate (LAC-HYDRIN) 12 % lotion, Apply topically 2 (two) times a day as needed for dry skin, Disp: 400 g, Rfl: 0  •  aspirin 81 MG tablet, Take 1 tablet by mouth daily, Disp: , Rfl:   •  atorvastatin (LIPITOR) 20 mg tablet, TAKE 1 TABLET BY MOUTH EVERY DAY, Disp: 90 tablet, Rfl: 2  •  DULoxetine (CYMBALTA) 60 mg delayed release capsule, TAKE 1 CAPSULE BY MOUTH EVERY DAY, Disp: 30 capsule, Rfl: 8  •  gabapentin (NEURONTIN) 400 mg capsule, Take 400 mg by mouth 2 (two) times a day, Disp: , Rfl:   •  hydrochlorothiazide (HYDRODIURIL) 12 5 mg tablet, TAKE 1 TABLET BY MOUTH EVERY DAY, Disp: 90 tablet, Rfl: 2  •  hydrocortisone 1 % cream, Apply topically 4 (four) times a day as needed for rash, Disp: 30 g, Rfl: 0  •  ibuprofen (MOTRIN) 800 mg tablet, Take 800 mg by mouth 3 (three) times a day as needed, Disp: , Rfl:   •  Klor-Con M20 20 MEQ tablet, TAKE 1 TABLET BY MOUTH EVERY DAY, Disp: 90 tablet, Rfl: 2  •  levothyroxine 175 mcg tablet, Take 1 tablet (175 mcg total) by mouth daily, Disp: 90 tablet, Rfl: 1  •  lisinopril (ZESTRIL) 10 mg tablet, TAKE 1 TABLET BY MOUTH EVERY DAY, Disp: 90 tablet, Rfl: 1  •  pantoprazole (PROTONIX) 40 mg tablet, TAKE 1 TABLET BY MOUTH EVERY DAY, Disp: 90 tablet, Rfl: 1  •  SUBOXONE 8-2 MG, USE 2 & 1/2 FILMS UNDER TONGUE DAILY, Disp: , Rfl: 0  •  methocarbamol (ROBAXIN) 750 mg tablet, TAKE 1 TABLET BY MOUTH FOUR TIMES A DAY AS NEEDED (Patient not taking: Reported on 1/24/2023), Disp: , Rfl:   •  pseudoephedrine (SUDAFED) 30 mg tablet, Take 60 mg by mouth every 4 (four) hours as needed for congestion (Patient not taking: Reported on 1/24/2023), Disp: , Rfl:   Social History     Socioeconomic History   • Marital status:      Spouse name: Not on file   • Number of children: Not on file   • Years of education: Not on file   • Highest education level: Not on file   Occupational History   • Not on file   Tobacco Use   • Smoking status: Every Day     Packs/day: 1 00     Years: 20 00     Pack years: 20 00     Types: Cigarettes   • Smokeless tobacco: Never   Vaping Use   • Vaping Use: Every day   • Substances: Nicotine   Substance and Sexual Activity • Alcohol use: Yes     Alcohol/week: 2 0 standard drinks     Types: 1 Glasses of wine, 1 Cans of beer per week   • Drug use: No   • Sexual activity: Not Currently     Partners: Male   Other Topics Concern   • Not on file   Social History Narrative   • Not on file     Social Determinants of Health     Financial Resource Strain: Not on file   Food Insecurity: Not on file   Transportation Needs: Not on file   Physical Activity: Not on file   Stress: Not on file   Social Connections: Not on file   Intimate Partner Violence: Not on file   Housing Stability: Not on file     Family History   Problem Relation Age of Onset   • Other Mother         Denial   • Vaginal cancer Mother 66   • Coronary artery disease Father    • Other Father         Denial   • Celiac disease Son    • Celiac disease Son    • Breast cancer Maternal Aunt      Past Medical History:   Diagnosis Date   • Abnormal ECG    • Abnormal Pap smear of cervix    • Denial    • Heart aneurysm    • Heart murmur    • Hypertension    • Hypothyroidism    • Infectious viral hepatitis     hep c   • [facility]         Review of Systems   Respiratory: Negative  Cardiovascular: Negative  Gastrointestinal: Negative for constipation and diarrhea  Genitourinary: Negative for difficulty urinating, pelvic pain, vaginal bleeding, vaginal discharge, itching or odor  +ELOY    O:  Blood pressure 122/80, height 5' 5" (1 651 m), weight 83 2 kg (183 lb 6 4 oz)  Patient appears well and is not in distress  Neck is supple without masses  Breasts are s/p augmentation  Limited exam reveals they are symmetrical without mass, tenderness, nipple discharge, skin changes or adenopathy  Abdomen is distended with gas, though soft and nontender without masses  Vulva without lesions or rashes  Urethral meatus and urethra are normal  Bladder is normal to palpation  Vagina is normal without discharge or bleeding     Cervix is retracted, unable to visualize fully     Uterus and adnexa are normal, mobile, nontender without palpable mass    Rectovaginal exam declined today

## 2023-01-25 LAB
C TRACH DNA SPEC QL NAA+PROBE: NEGATIVE
HPV HR 12 DNA CVX QL NAA+PROBE: POSITIVE
HPV16 DNA CVX QL NAA+PROBE: NEGATIVE
HPV18 DNA CVX QL NAA+PROBE: NEGATIVE
N GONORRHOEA DNA SPEC QL NAA+PROBE: NEGATIVE

## 2023-01-26 ENCOUNTER — OFFICE VISIT (OUTPATIENT)
Dept: INTERNAL MEDICINE CLINIC | Facility: CLINIC | Age: 63
End: 2023-01-26

## 2023-01-26 VITALS
TEMPERATURE: 97.4 F | HEIGHT: 65 IN | SYSTOLIC BLOOD PRESSURE: 130 MMHG | WEIGHT: 187.8 LBS | BODY MASS INDEX: 31.29 KG/M2 | OXYGEN SATURATION: 96 % | HEART RATE: 73 BPM | DIASTOLIC BLOOD PRESSURE: 80 MMHG

## 2023-01-26 DIAGNOSIS — F17.210 CIGARETTE NICOTINE DEPENDENCE WITHOUT COMPLICATION: ICD-10-CM

## 2023-01-26 DIAGNOSIS — R06.02 SOB (SHORTNESS OF BREATH): ICD-10-CM

## 2023-01-26 DIAGNOSIS — R20.0 NUMBNESS OF FACE: ICD-10-CM

## 2023-01-26 DIAGNOSIS — M54.50 CHRONIC BILATERAL LOW BACK PAIN WITHOUT SCIATICA: ICD-10-CM

## 2023-01-26 DIAGNOSIS — I10 ESSENTIAL HYPERTENSION: Primary | ICD-10-CM

## 2023-01-26 DIAGNOSIS — G89.29 CHRONIC BILATERAL LOW BACK PAIN WITHOUT SCIATICA: ICD-10-CM

## 2023-01-26 DIAGNOSIS — R73.9 HYPERGLYCEMIA: ICD-10-CM

## 2023-01-26 DIAGNOSIS — E66.9 OBESITY (BMI 30-39.9): ICD-10-CM

## 2023-01-26 DIAGNOSIS — R14.0 ABDOMINAL DISTENSION: ICD-10-CM

## 2023-01-26 DIAGNOSIS — Z12.2 ENCOUNTER FOR SCREENING FOR LUNG CANCER: ICD-10-CM

## 2023-01-26 DIAGNOSIS — F33.41 RECURRENT MAJOR DEPRESSIVE DISORDER, IN PARTIAL REMISSION (HCC): ICD-10-CM

## 2023-01-26 DIAGNOSIS — F17.210 SMOKING GREATER THAN 20 PACK YEARS: ICD-10-CM

## 2023-01-26 DIAGNOSIS — K21.9 GASTROESOPHAGEAL REFLUX DISEASE WITHOUT ESOPHAGITIS: ICD-10-CM

## 2023-01-26 DIAGNOSIS — E03.9 ACQUIRED HYPOTHYROIDISM: ICD-10-CM

## 2023-01-26 DIAGNOSIS — E78.2 MIXED HYPERLIPIDEMIA: ICD-10-CM

## 2023-01-26 DIAGNOSIS — B18.2 CHRONIC HEPATITIS C WITHOUT HEPATIC COMA (HCC): ICD-10-CM

## 2023-01-26 PROBLEM — E66.3 OVERWEIGHT: Status: RESOLVED | Noted: 2019-07-11 | Resolved: 2023-01-26

## 2023-01-26 LAB — T VAGINALIS DNA SPEC QL NAA+PROBE: NEGATIVE

## 2023-01-26 RX ORDER — GABAPENTIN 300 MG/1
600 CAPSULE ORAL 2 TIMES DAILY
Qty: 360 CAPSULE | Refills: 2 | Status: SHIPPED | OUTPATIENT
Start: 2023-01-26

## 2023-01-26 RX ORDER — HYDROCHLOROTHIAZIDE 25 MG/1
25 TABLET ORAL DAILY
Qty: 90 TABLET | Refills: 2 | Status: SHIPPED | OUTPATIENT
Start: 2023-01-26

## 2023-01-26 RX ORDER — ALBUTEROL SULFATE 90 UG/1
1 AEROSOL, METERED RESPIRATORY (INHALATION) EVERY 6 HOURS PRN
Qty: 18 G | Refills: 3 | Status: SHIPPED | OUTPATIENT
Start: 2023-01-26

## 2023-01-26 NOTE — PROGRESS NOTES
INTERNAL MEDICINE OFFICE VISIT  [facility], [address]  Tel: [phone]      NAME: Brenda Mercado  AGE: 61 y o  SEX: female  : 1960   MRN: [id number]    DATE: 2023  TIME: 9:35 AM      Assessment and Plan:  1  Essential hypertension  Her blood pressure is fairly controlled but has a lot of swelling in her legs and hands  The dose of hydrochlorothiazide was increased to 25 mg daily  She was told to cut down on the salt intake in her diet as much as possible  - hydrochlorothiazide (HYDRODIURIL) 25 mg tablet; Take 1 tablet (25 mg total) by mouth daily  Dispense: 90 tablet; Refill: 2    2  Numbness of face  Will get back to her with the results of the MRI  - MRI brain w wo contrast; Future    3  Abdominal distension  She complains of abdominal distention and lower abdominal pain  We will get an ultrasound done  - US abdomen complete; Future    4  Mixed hyperlipidemia  Continue atorvastatin  - CBC and differential; Future  - Comprehensive metabolic panel; Future  - Lipid panel; Future  - TSH, 3rd generation; Future    5  Acquired hypothyroidism  Continue levothyroxine 175 mcg daily    6  Hyperglycemia  Was advised a low carbohydrate diet  - Hemoglobin A1C; Future    7  Chronic hepatitis C without hepatic coma (HCC)  Was advised to follow-up with GI for further evaluation and treatment    8  Gastroesophageal reflux disease without esophagitis  Pantoprazole    9  Recurrent major depressive disorder, in partial remission (HCC)  Duloxetine    10  SOB (shortness of breath)    - albuterol (PROVENTIL HFA,VENTOLIN HFA) 90 mcg/act inhaler; Inhale 1 puff every 6 (six) hours as needed for wheezing  Dispense: 18 g; Refill: 3    11  Chronic bilateral low back pain without sciatica  The dose of gabapentin was increased to 600 mg twice a day  - gabapentin (NEURONTIN) 300 mg capsule;  Take 2 capsules (600 mg total) by mouth 2 (two) times a day  Dispense: 360 capsule; Refill: 2    12  Cigarette nicotine dependence without complication  Was counseled to quit smoking    13  Obesity (BMI 30-39  9)  BMI Counseling: Body mass index is 31 25 kg/m²  The BMI is above normal  Nutrition recommendations include decreasing portion sizes, encouraging healthy choices of fruits and vegetables and moderation in carbohydrate intake  Exercise recommendations include moderate physical activity 150 minutes/week  Rationale for BMI follow-up plan is due to patient being overweight or obese  Tobacco Cessation Counseling: Tobacco cessation counseling was provided  The patient is sincerely urged to quit consumption of tobacco  She is not ready to quit tobacco          14  Encounter for screening for lung cancer    - [place] lung screening program; Future    15  Smoking greater than 20 pack years    - CT lung screening program; Future      - Counseling Documentation: patient was counseled regarding: diagnostic results, instructions for management, risk factor reductions, prognosis, patient and family education, risks and benefits of treatment options and importance of compliance with treatment  - Medication Side Effects: Adverse side effects of medications were reviewed with the patient/guardian today  Return for follow up visit in 4 months or earlier, if needed  Chief Complaint:  Chief Complaint   Patient presents with   • Follow-up         History of Present Illness:   Patient has multiple medical problems  She complains of numbness of the right ear and the area surrounding it for the last 3 to 4 days  She does not have any pain in the area  She denies any tingling  She does not have any weakness in her right side  Blood pressure is controlled but she has swelling in her ankles    She has abdominal distention which is out of proportion to the normal   She denies any pain in her abdomen  The cholesterol and TSH as well as the A1c are within normal range  She was treated for hepatitis C but never went back for follow-up with GI  GERD has been stable  She takes Cymbalta for the depression symptoms and they are fairly controlled  She continues to have pain in her lower back  She continues to smoke despite being told several times to quit  Needs to lose weight          Active Problem List:  Patient Active Problem List   Diagnosis   • Acquired hypothyroidism   • Essential hypertension   • Recurrent major depressive disorder, in partial remission ([facility] )   • Mixed hyperlipidemia   • Gastroesophageal reflux disease without esophagitis   • Opioid dependence ([facility] )   • Hyperglycemia   • Cigarette nicotine dependence without complication   • Chronic pain of right knee   • Chronic bilateral low back pain without sciatica   • Peripheral vascular disease ([facility] )   • SOB (shortness of breath)   • Esophageal dysphagia   • History of colon polyps   • DDD (degenerative disc disease), cervical   • Chronic hepatitis C without hepatic coma (HCC)   • Impingement syndrome of right shoulder   • Obesity (BMI 30-39  9)   • Numbness of face         Past Medical History:  Past Medical History:   Diagnosis Date   • Abnormal ECG    • Abnormal Pap smear of cervix    • Denial    • Heart aneurysm    • Heart murmur    • Hypertension    • Hypothyroidism    • Infectious viral hepatitis     hep c   • Overweight 2019   • PAD (peripheral artery disease) (HCC)          Past Surgical History:  Past Surgical History:   Procedure Laterality Date   • AUGMENTATION MAMMAPLASTY     •  SECTION     • COLONOSCOPY  2021   • ENDOSCOPY OF POUCH  2021         Family History:  Family History   Problem Relation Age of Onset   • Other Mother         Denial   • Vaginal cancer Mother 66   • Coronary artery disease Father    • Other Father         Denial   • Celiac disease Son    • Celiac disease Son    • Breast cancer Maternal Aunt          Social History:  Social History     Socioeconomic History   • Marital status:      Spouse name: None   • Number of children: None   • Years of education: None   • Highest education level: None   Occupational History   • None   Tobacco Use   • Smoking status: Every Day     Packs/day: 1 00     Years: 20 00     Pack years: 20 00     Types: Cigarettes   • Smokeless tobacco: Never   Vaping Use   • Vaping Use: Every day   • Substances: Nicotine   Substance and Sexual Activity   • Alcohol use: Yes     Alcohol/week: 2 0 standard drinks     Types: 1 Glasses of wine, 1 Cans of beer per week   • Drug use: No   • Sexual activity: Not Currently     Partners: Male   Other Topics Concern   • None   Social History Narrative   • None     Social Determinants of Health     Financial Resource Strain: Not on file   Food Insecurity: Not on file   Transportation Needs: Not on file   Physical Activity: Not on file   Stress: Not on file   Social Connections: Not on file   Intimate Partner Violence: Not on file   Housing Stability: Not on file         Allergies:   Allergies   Allergen Reactions   • Penicillins Itching         Medications:    Current Outpatient Medications:   •  albuterol (PROVENTIL HFA,VENTOLIN HFA) 90 mcg/act inhaler, Inhale 1 puff every 6 (six) hours as needed for wheezing, Disp: 18 g, Rfl: 3  •  ammonium lactate (LAC-HYDRIN) 12 % lotion, Apply topically 2 (two) times a day as needed for dry skin, Disp: 400 g, Rfl: 0  •  aspirin 81 MG tablet, Take 1 tablet by mouth daily, Disp: , Rfl:   •  atorvastatin (LIPITOR) 20 mg tablet, TAKE 1 TABLET BY MOUTH EVERY DAY, Disp: 90 tablet, Rfl: 2  •  DULoxetine (CYMBALTA) 60 mg delayed release capsule, TAKE 1 CAPSULE BY MOUTH EVERY DAY, Disp: 30 capsule, Rfl: 8  •  gabapentin (NEURONTIN) 300 mg capsule, Take 2 capsules (600 mg total) by mouth 2 (two) times a day, Disp: 360 capsule, Rfl: 2  •  hydrochlorothiazide (HYDRODIURIL) 25 mg tablet, Take 1 tablet (25 mg total) by mouth daily, Disp: 90 tablet, Rfl: 2  •  hydrocortisone 1 % cream, Apply topically 4 (four) times a day as needed for rash, Disp: 30 g, Rfl: 0  •  ibuprofen (MOTRIN) 800 mg tablet, Take 800 mg by mouth 3 (three) times a day as needed, Disp: , Rfl:   •  Klor-Con M20 20 MEQ tablet, TAKE 1 TABLET BY MOUTH EVERY DAY, Disp: 90 tablet, Rfl: 2  •  levothyroxine 175 mcg tablet, Take 1 tablet (175 mcg total) by mouth daily, Disp: 90 tablet, Rfl: 1  •  lisinopril (ZESTRIL) 10 mg tablet, TAKE 1 TABLET BY MOUTH EVERY DAY, Disp: 90 tablet, Rfl: 1  •  pantoprazole (PROTONIX) 40 mg tablet, TAKE 1 TABLET BY MOUTH EVERY DAY, Disp: 90 tablet, Rfl: 1  •  pseudoephedrine (SUDAFED) 30 mg tablet, Take 60 mg by mouth every 4 (four) hours as needed for congestion, Disp: , Rfl:   •  SUBOXONE 8-2 MG, USE 2 & 1/2 FILMS UNDER TONGUE DAILY, Disp: , Rfl: 0      The following portions of the patient's history were reviewed and updated as appropriate: past medical history, past surgical history, family history, social history, allergies, current medications and active problem list       Review of Systems:  Constitutional: Denies fever, chills, weight gain, weight loss, complains of fatigue  Eyes: Denies eye redness, eye discharge, double vision, change in visual acuity  ENT: Denies hearing loss, tinnitus, sneezing, nasal congestion, nasal discharge, sore throat   Respiratory: Denies cough, expectoration, hemoptysis, shortness of breath, wheezing  Cardiovascular: Denies chest pain, palpitations, lower extremity swelling, orthopnea, PND  Gastrointestinal: Complains of abdominal distention    Denies abdominal pain, heartburn, nausea, vomiting, hematemesis, diarrhea, bloody stools  Genito-Urinary: Denies dysuria, frequency, difficulty in micturition, nocturia, incontinence  Musculoskeletal: Has back pain, joint pain, muscle pain  Neurologic: Denies confusion, lightheadedness, syncope, headache, focal weakness, has numbness around the right ear  Endocrine: Denies polyuria, polydipsia, temperature intolerance  Allergy and Immunology: Denies hives, insect bite sensitivity  Hematological and Lymphatic: Denies bleeding problems, swollen glands   Psychological: Denies depression, suicidal ideation, anxiety, panic, mood swings  Dermatological: Denies pruritus, rash, skin lesion changes      Vitals:  Vitals:    01/26/23 0847   BP: 130/80   Pulse: 73   Temp: (!) 97 4 °F (36 3 °C)   SpO2: 96%       Body mass index is 31 25 kg/m²  Weight (last 2 days)     Date/Time Weight    01/26/23 0847 85 2 (187 8)            Physical Examination:  General: Patient is not in acute distress  Awake, alert, responding to commands  No weight gain or loss  Head: Normocephalic  Atraumatic  Eyes: Conjunctiva and lids with no swelling, erythema or discharge  Both pupils normal sized, round and reactive to light  Sclera nonicteric  ENT: External examination of nose and ear normal  Otoscopic examination shows translucent tympanic membranes with patent canals without erythema  Oropharynx moist with no erythema, edema, exudate or lesions  Neck: Supple  JVP not raised  Trachea midline  No masses  No thyromegaly  Lungs: No signs of increased work of breathing or respiratory distress  Bilateral bronchovascular breath sounds with no crackles or rhonchi  Chest wall: No tenderness  Cardiovascular: Normal PMI  No thrills  Regular rate and rhythm  S1 and S2 normal  No murmur, rub or gallop  Gastrointestinal: Abdomen soft, nontender  No guarding or rigidity  Liver and spleen not palpable  Bowel sounds present  Neurologic: Cranial nerves II-XII intact   Cortical functions normal  Motor system - Reflexes 2+ and symmetrical  Sensations normal  Musculoskeletal: Gait normal  No joint tenderness  Integumentary: Skin normal with no rash or lesions  Lymphatic: No palpable lymph nodes in neck, axilla or groin  Extremities: No clubbing, cyanosis, edema or varicosities  Psychological: Judgement and insight normal   Depressed      Laboratory Results:  CBC with diff:   Lab Results   Component Value Date    WBC 8 60 12/29/2022    RBC 4 84 12/29/2022    HGB 15 1 12/29/2022    HCT 47 3 (H) 12/29/2022    MCV 98 12/29/2022    MCH 31 2 12/29/2022    RDW 13 6 12/29/2022     12/29/2022       CMP:  Lab Results   Component Value Date    CREATININE 0 74 12/29/2022    BUN 14 12/29/2022    K 4 0 12/29/2022     12/29/2022    CO2 32 12/29/2022    ALKPHOS 56 12/29/2022    ALT 23 12/29/2022    AST 22 12/29/2022       Lab Results   Component Value Date    HGBA1C 5 7 (H) 12/29/2022       No results found for: TROPONINI, CKMB, CKTOTAL    Lipid Profile:   No results found for: CHOL  Lab Results   Component Value Date    HDL 52 12/29/2022    HDL 56 07/09/2022     Lab Results   Component Value Date    LDLCALC 57 12/29/2022    LDLCALC 36 07/09/2022     Lab Results   Component Value Date    TRIG 189 (H) 12/29/2022    TRIG 99 07/09/2022       Imaging Results:  Colonoscopy  Narrative:  [facility] [place] [address]  [phone]    DATE OF SERVICE:  9/27/21    PHYSICIAN(S):  Tiana Chinchilla MD - Attending Physician    INDICATION:  History of colon polyps  Colonoscopy performed for a diagnostic indication  POST-OP DIAGNOSIS:  See the impression below  HISTORY:  Prior colonoscopy: More than 10 years ago  BOWEL PREPARATION:  Miralax/Dulcolax    PREPROCEDURE:  Informed consent was obtained for the procedure, including sedation  Risks   including but not limited to bleeding, infection, perforation, adverse   drug reaction and aspiration were explained in detail  Also explained   about less than 100% sensitivity with the exam and other alternatives  The   patient was placed in the left lateral decubitus position  DETAILS OF PROCEDURE:  Patient was taken to the procedure room where a time out was performed to   confirm correct patient and correct procedure   The patient underwent   monitored anesthesia care, which was administered by an anesthesia   professional  The patient's blood pressure, heart rate, level of   consciousness, oxygen and respirations were monitored throughout the   procedure  A digital rectal exam was performed  The scope was introduced   through the anus and advanced to the cecum  Retroflexion was performed in   the rectum  The quality of bowel preparation was evaluated using the   [facility] Bowel Preparation Scale with scores of: right colon = 2, transverse   colon = 2, left colon = 2  The total BBPS score was 6  Bowel prep was   adequate  The patient experienced no blood loss  The procedure was not   difficult  The patient tolerated the procedure well  There were no   apparent complications  ANESTHESIA INFORMATION:  ASA: III  Anesthesia Type: IV Sedation with Anesthesia    MEDICATIONS:  lactated ringers infusion 500 mL*    *From user-documented volume   (Totals for administrations occurring from 0805 to 0826 on 09/27/21)     FINDINGS:  All observed locations appeared normal, including the cecum, ascending   colon, hepatic flexure, transverse colon, splenic flexure, descending   colon, sigmoid colon, rectosigmoid and rectum  EVENTS:  Procedure Events   Event Event Time   ENDO CECUM REACHED 9/27/2021  8:21 AM   ENDO SCOPE OUT TIME 9/27/2021  8:26 AM     SPECIMENS:  ID Type Source Tests Collected by Time Destination   1 :  Tissue Duodenum TISSUE EXAM Darren Thompson MD 9/27/2021  8:13   AM    2 :  Tissue Stomach TISSUE EXAM Darren Thompson MD 9/27/2021  8:14   AM    3 :  Tissue Esophagus TISSUE EXAM Darren Thompson MD 9/27/2021  8:14   AM      EQUIPMENT:  Colonoscope -[id number]   Impression: No polyps  Normal colonoscopy    RECOMMENDATION:  Repeat colonoscopy in 5 years due to a personal history of colon polyps    High-fiber diet    Darren Thompson MD  EGD  Narrative:  [facility]  [facility]  [phone]    DATE OF SERVICE:  9/27/21    PHYSICIAN(S):  Gilmer Barba III, MD - Attending Physician    INDICATION:  Esophageal dysphagia, Gastroesophageal reflux disease without esophagitis    POST-OP DIAGNOSIS:  See the impression below  PREPROCEDURE:  Informed consent was obtained for the procedure, including sedation  Risks of perforation, hemorrhage, adverse drug reaction and aspiration   were discussed  The patient was placed in the left lateral decubitus   position  Patient was explained about the risks and benefits of the procedure  Risks   including but not limited to bleeding, infection, and perforation were   explained in detail  Also explained about less than 100% sensitivity with   the exam and other alternatives  DETAILS OF PROCEDURE:  Patient was taken to the procedure room where a time out was performed to   confirm correct patient and correct procedure  The patient underwent   monitored anesthesia care, which was administered by an anesthesia   professional  The patient's blood pressure, heart rate, level of   consciousness, respirations and oxygen were monitored throughout the   procedure  The scope was advanced to the second part of the duodenum  Retroflexion was performed in the fundus  The patient experienced no blood   loss  The procedure was not difficult  The patient tolerated the procedure   well  There were no apparent complications  ANESTHESIA INFORMATION:  ASA: III  Anesthesia Type: IV Sedation with Anesthesia    MEDICATIONS:  No administrations occurring from 0805 to 0813 on 09/27/21     FINDINGS:  The upper third of the esophagus, middle third of the esophagus and lower   third of the esophagus appeared normal  Performed random biopsy    Grade A esophagitis with mucosal breaks measuring less than 5 mm not   continuous between folds, covering less than 75% of the circumference in   the GE junction  Mild, patchy erythematous and friable mucosa in the fundus of the stomach,   body of the stomach and antrum; performed cold forceps biopsy to rule out   H  pylori  The duodenal bulb, 1st part of the duodenum and 2nd part of the duodenum   appeared normal  Performed random biopsy      SPECIMENS:  * No specimens in log *  Impression: LA grade a reflux esophagitis  Mild nonerosive gastritis    RECOMMENDATION:  Protonix 40 mg daily for 8-12 weeks  Reflux precautions and weight loss  Await pathology     Guanakito Rivera MD       Health Maintenance:  Health Maintenance   Topic Date Due   • Hepatitis A Vaccine (1 of 2 - Risk 2-dose series) Never done   • Pneumococcal Vaccine: Pediatrics (0 to 5 Years) and At-Risk Patients (6 to 59 Years) (1 - PCV) 01/14/1966   • DTaP,Tdap,and Td Vaccines (1 - Tdap) 01/14/1981   • Lung Cancer Screening  Never done   • Hepatitis B Vaccine (1 of 3 - Risk 3-dose series) Never done   • Cervical Cancer Screening  05/22/2020   • COVID-19 Vaccine (3 - Booster for Pfizer series) 07/29/2021   • Breast Cancer Screening: Mammogram  08/24/2022   • BMI: Followup Plan  02/21/2023   • Depression Remission PHQ  06/13/2023   • Annual Physical  01/24/2024   • BMI: Adult  01/26/2024   • Colorectal Cancer Screening  09/26/2026   • HIV Screening  Completed   • Influenza Vaccine  Completed   • HIB Vaccine  Aged Out   • IPV Vaccine  Aged Out   • Meningococcal ACWY Vaccine  Aged Out   • HPV Vaccine  Aged Out   • Hepatitis C Screening  Discontinued     Immunization History   Administered Date(s) Administered   • COVID-19 PFIZER VACCINE 0 3 ML IM 05/12/2021, 06/03/2021   • INFLUENZA 11/16/2018   • Influenza, recombinant, quadrivalent,injectable, preservative free 11/16/2018, 09/23/2020, 10/04/2021, 12/13/2022   • Influenza, seasonal, injectable 1960   • Pneumococcal Polysaccharide PPV23 1960   • Tdap 1960   • Zoster 1960         Alvin Almazan MD  1/26/2023,9:35 AM

## 2023-01-27 ENCOUNTER — TELEPHONE (OUTPATIENT)
Dept: INTERNAL MEDICINE CLINIC | Facility: CLINIC | Age: 63
End: 2023-01-27

## 2023-01-27 NOTE — TELEPHONE ENCOUNTER
Patient has all her test schedule for March 1st just want to know if that was ok being it is out that far      Would like a call at [phone] to let her know if it is ok

## 2023-01-31 ENCOUNTER — OFFICE VISIT (OUTPATIENT)
Dept: OBGYN CLINIC | Facility: CLINIC | Age: 63
End: 2023-01-31

## 2023-01-31 VITALS
HEIGHT: 65 IN | BODY MASS INDEX: 29.99 KG/M2 | SYSTOLIC BLOOD PRESSURE: 147 MMHG | DIASTOLIC BLOOD PRESSURE: 89 MMHG | WEIGHT: 180 LBS | HEART RATE: 73 BPM

## 2023-01-31 DIAGNOSIS — M75.41 IMPINGEMENT SYNDROME OF RIGHT SHOULDER: ICD-10-CM

## 2023-01-31 DIAGNOSIS — S46.011A ROTATOR CUFF STRAIN, RIGHT, INITIAL ENCOUNTER: Primary | ICD-10-CM

## 2023-01-31 DIAGNOSIS — M67.813 TENDINOSIS OF RIGHT SHOULDER: ICD-10-CM

## 2023-01-31 RX ADMIN — TRIAMCINOLONE ACETONIDE 40 MG: 40 INJECTION, SUSPENSION INTRA-ARTICULAR; INTRAMUSCULAR at 16:26

## 2023-01-31 RX ADMIN — BUPIVACAINE HYDROCHLORIDE 4 ML: 2.5 INJECTION, SOLUTION INFILTRATION; PERINEURAL at 16:26

## 2023-01-31 RX ADMIN — BUPIVACAINE HYDROCHLORIDE 2 ML: 2.5 INJECTION, SOLUTION INFILTRATION; PERINEURAL at 16:26

## 2023-01-31 NOTE — PROGRESS NOTES
Assessment/Plan:  Assessment/Plan   Diagnoses and all orders for this visit:    Rotator cuff strain, right, initial encounter  -     Large joint arthrocentesis: R subacromial bursa    Impingement syndrome of right shoulder  -     XR shoulder 2+ vw right    Tendinosis of right shoulder  -     Large joint arthrocentesis: R subacromial bursa      80-year-old right-hand-dominant female with right shoulder pain more than 4 months duration  Discussed with patient physical exam, prior imaging studies, impression, and plan  MRI right shoulder noted for moderate supraspinatus and infraspinatus tendinosis without tear, mild glenohumeral joint osteoarthritis with several small full-thickness chondral defect humeral head superiorly  Physical exam right shoulder noted for tenderness anterior and lateral aspects  She has range of motion limited to forward flexion of 90 degrees, abduction 70 degrees, and internal rotation to the PSIS  She had normal strength in the rotator cuff  Empty can testing is unremarkable  There is pain with Duran  Clinical impression is that she is symptomatic from rotator cuff tendinopathy  I offered patient steroid injection to which she agreed  I administered mixture 3 cc 0 25% bupivacaine and 1 cc Kenalog to the right shoulder subacromial space without complication  She will follow-up as needed  Subjective:   Patient ID: Thiago Murrieta is a 61 y o  female  Chief Complaint   Patient presents with   • Right Shoulder - Pain       80-year-old right-hand-dominant female presents for right shoulder pain of more than 4 months duration  She states she was in a car and she grabbed onto the handlebar above the door frame when she felt a pull in the shoulder    She had pain described as sudden in onset, generalized to the shoulder worse at the anterior lateral aspect, radiating distally along the arm to the level of the elbow and proximally along the trapezius to the neck, worse with moving the arm, associated with limited range of motion, and improved with resting  She applies Tiger balm to help with symptoms  She saw primary care provider and was referred for x-rays and referred to orthopedic care  Shoulder Pain  This is a new problem  The current episode started more than 1 month ago  The problem occurs daily  The problem has been gradually worsening  Associated symptoms include arthralgias  Pertinent negatives include no abdominal pain, chest pain, chills, fever, joint swelling, numbness, rash, sore throat or weakness  Exacerbated by: Arm movement  She has tried rest and position changes (Tiger balm) for the symptoms  The treatment provided mild relief  The following portions of the patient's history were reviewed and updated as appropriate: She  has a past medical history of Abnormal ECG, Abnormal Pap smear of cervix, Denial, Heart aneurysm, Heart murmur, Hypertension, Hypothyroidism, Infectious viral hepatitis, Overweight (2019), and PAD (peripheral artery disease) ([facility] [address] )  She  has a past surgical history that includes  section; Augmentation mammaplasty; Colonoscopy (2021); and ENDOSCOPY OF POUCH (2021)  Her family history includes Breast cancer in her maternal aunt; Celiac disease in her son and son; Coronary artery disease in her father; Other in her father and mother; Vaginal cancer (age of onset: 66) in her mother  She  reports that she has been smoking cigarettes  She has a 20 00 pack-year smoking history  She has never used smokeless tobacco  She reports current alcohol use of about 2 0 standard drinks per week  She reports that she does not use drugs  She is allergic to penicillins       Review of Systems   Constitutional: Negative for chills and fever  HENT: Negative for sore throat  Eyes: Negative for visual disturbance  Respiratory: Negative for shortness of breath  Cardiovascular: Negative for chest pain     Gastrointestinal: Negative for abdominal pain  Genitourinary: Negative for flank pain  Musculoskeletal: Positive for arthralgias  Negative for joint swelling  Skin: Negative for rash and wound  Neurological: Negative for weakness and numbness  Hematological: Does not bruise/bleed easily  Psychiatric/Behavioral: Negative for self-injury  Objective:  Vitals:    01/31/23 1549   BP: 147/89   Pulse: 73   Weight: 81 6 kg (180 lb)   Height: 5' 5" (1 651 m)     Right Shoulder Exam     Tenderness   Right shoulder tenderness location: Anterior, lateral     Range of Motion   Active abduction: 70   Forward flexion: 90   Right shoulder internal rotation 0 degrees: PSIS  Muscle Strength   Abduction: 5/5   Internal rotation: 5/5   External rotation: 5/5   Supraspinatus: 5/5     Tests   Duran test: positive    Comments:  Negative empty can test            Physical Exam  Vitals and nursing note reviewed  Constitutional:       General: She is not in acute distress  Appearance: She is well-developed  She is not ill-appearing or diaphoretic  HENT:      Head: Normocephalic  Right Ear: External ear normal       Left Ear: External ear normal    Eyes:      Conjunctiva/sclera: Conjunctivae normal    Neck:      Trachea: No tracheal deviation  Cardiovascular:      Rate and Rhythm: Normal rate  Pulmonary:      Effort: Pulmonary effort is normal  No respiratory distress  Abdominal:      General: There is no distension  Musculoskeletal:         General: Tenderness present  No swelling, deformity or signs of injury  Skin:     General: Skin is warm and dry  Coloration: Skin is not jaundiced or pale  Neurological:      Mental Status: She is alert and oriented to person, place, and time  Psychiatric:         Mood and Affect: Mood normal          Behavior: Behavior normal          Thought Content:  Thought content normal          Judgment: Judgment normal          I have personally reviewed pertinent films in PACS and my interpretation is No appreciable tendon retraction  Large joint arthrocentesis: R subacromial bursa  Universal Protocol:  Consent: Verbal consent obtained  Risks and benefits: risks, benefits and alternatives were discussed  Consent given by: patient  Time out: Immediately prior to procedure a "time out" was called to verify the correct patient, procedure, equipment, support staff and site/side marked as required  Patient understanding: patient states understanding of the procedure being performed  Patient consent: the patient's understanding of the procedure matches consent given  Procedure consent: procedure consent matches procedure scheduled  Relevant documents: relevant documents present and verified  Test results: test results available and properly labeled  Site marked: the operative site was marked  Radiology Images displayed and confirmed   If images not available, report reviewed: imaging studies available  Required items: required blood products, implants, devices, and special equipment available  Patient identity confirmed: verbally with patient    Supporting Documentation  Indications: pain   Procedure Details  Location: shoulder - R subacromial bursa  Preparation: Patient was prepped and draped in the usual sterile fashion  Needle size: 22 G  Ultrasound guidance: no  Approach: lateral  Medications administered: 4 mL bupivacaine 0 25 %; 2 mL bupivacaine 0 25 %; 40 mg triamcinolone acetonide 40 mg/mL    Patient tolerance: patient tolerated the procedure well with no immediate complications  Dressing:  Sterile dressing applied

## 2023-01-31 NOTE — LETTER
February 2, 2023     [facility], [facility] [place]    Patient: Shukri Chavarria   YOB: 1960   Date of Visit: 1/31/2023       Dear Dr Navneet Pretty: Thank you for referring Shukri Chavarria to me for evaluation  Below are my notes for this consultation  If you have questions, please do not hesitate to call me  I look forward to following your patient along with you  Sincerely,        [employer], DO        CC: No Recipients  C JANETT Rosa, DO  2/2/2023 10:03 AM  Sign when Signing Visit  Assessment/Plan:  Assessment/Plan   Diagnoses and all orders for this visit:    Rotator cuff strain, right, initial encounter  -     Large joint arthrocentesis: R subacromial bursa    Impingement syndrome of right shoulder  -     XR shoulder 2+ vw right    Tendinosis of right shoulder  -     Large joint arthrocentesis: R subacromial bursa      70-year-old right-hand-dominant female with right shoulder pain more than 4 months duration  Discussed with patient physical exam, prior imaging studies, impression, and plan  MRI right shoulder noted for moderate supraspinatus and infraspinatus tendinosis without tear, mild glenohumeral joint osteoarthritis with several small full-thickness chondral defect humeral head superiorly  Physical exam right shoulder noted for tenderness anterior and lateral aspects  She has range of motion limited to forward flexion of 90 degrees, abduction 70 degrees, and internal rotation to the PSIS  She had normal strength in the rotator cuff  Empty can testing is unremarkable  There is pain with Duran  Clinical impression is that she is symptomatic from rotator cuff tendinopathy  I offered patient steroid injection to which she agreed  I administered mixture 3 cc 0 25% bupivacaine and 1 cc Kenalog to the right shoulder subacromial space without complication    She will follow-up as needed  Subjective:   Patient ID: Aline Lamb is a 61 y o  female  Chief Complaint   Patient presents with   • Right Shoulder - Pain       12-year-old right-hand-dominant female presents for right shoulder pain of more than 4 months duration  She states she was in a car and she grabbed onto the handlebar above the door frame when she felt a pull in the shoulder  She had pain described as sudden in onset, generalized to the shoulder worse at the anterior lateral aspect, radiating distally along the arm to the level of the elbow and proximally along the trapezius to the neck, worse with moving the arm, associated with limited range of motion, and improved with resting  She applies Tiger balm to help with symptoms  She saw primary care provider and was referred for x-rays and referred to orthopedic care  Shoulder Pain  This is a new problem  The current episode started more than 1 month ago  The problem occurs daily  The problem has been gradually worsening  Associated symptoms include arthralgias  Pertinent negatives include no abdominal pain, chest pain, chills, fever, joint swelling, numbness, rash, sore throat or weakness  Exacerbated by: Arm movement  She has tried rest and position changes (Tiger balm) for the symptoms  The treatment provided mild relief  The following portions of the patient's history were reviewed and updated as appropriate: She  has a past medical history of Abnormal ECG, Abnormal Pap smear of cervix, Denial, Heart aneurysm, Heart murmur, Hypertension, Hypothyroidism, Infectious viral hepatitis, Overweight (2019), and PAD (peripheral artery disease) ([facility] [address] )  She  has a past surgical history that includes  section; Augmentation mammaplasty; Colonoscopy (2021); and ENDOSCOPY OF POUCH (2021)  Her family history includes Breast cancer in her maternal aunt; Celiac disease in her son and son; Coronary artery disease in her father;  Other in her father and mother; Vaginal cancer (age of onset: 66) in her mother  She  reports that she has been smoking cigarettes  She has a 20 00 pack-year smoking history  She has never used smokeless tobacco  She reports current alcohol use of about 2 0 standard drinks per week  She reports that she does not use drugs  She is allergic to penicillins       Review of Systems   Constitutional: Negative for chills and fever  HENT: Negative for sore throat  Eyes: Negative for visual disturbance  Respiratory: Negative for shortness of breath  Cardiovascular: Negative for chest pain  Gastrointestinal: Negative for abdominal pain  Genitourinary: Negative for flank pain  Musculoskeletal: Positive for arthralgias  Negative for joint swelling  Skin: Negative for rash and wound  Neurological: Negative for weakness and numbness  Hematological: Does not bruise/bleed easily  Psychiatric/Behavioral: Negative for self-injury  Objective:  Vitals:    01/31/23 1549   BP: 147/89   Pulse: 73   Weight: 81 6 kg (180 lb)   Height: 5' 5" (1 651 m)     Right Shoulder Exam     Tenderness   Right shoulder tenderness location: Anterior, lateral     Range of Motion   Active abduction: 70   Forward flexion: 90   Right shoulder internal rotation 0 degrees: PSIS  Muscle Strength   Abduction: 5/5   Internal rotation: 5/5   External rotation: 5/5   Supraspinatus: 5/5     Tests   Duran test: positive    Comments:  Negative empty can test            Physical Exam  Vitals and nursing note reviewed  Constitutional:       General: She is not in acute distress  Appearance: She is well-developed  She is not ill-appearing or diaphoretic  HENT:      Head: Normocephalic  Right Ear: External ear normal       Left Ear: External ear normal    Eyes:      Conjunctiva/sclera: Conjunctivae normal    Neck:      Trachea: No tracheal deviation  Cardiovascular:      Rate and Rhythm: Normal rate     Pulmonary:      Effort: Pulmonary effort is normal  No respiratory distress  Abdominal:      General: There is no distension  Musculoskeletal:         General: Tenderness present  No swelling, deformity or signs of injury  Skin:     General: Skin is warm and dry  Coloration: Skin is not jaundiced or pale  Neurological:      Mental Status: She is alert and oriented to person, place, and time  Psychiatric:         Mood and Affect: Mood normal          Behavior: Behavior normal          Thought Content: Thought content normal          Judgment: Judgment normal          I have personally reviewed pertinent films in PACS and my interpretation is No appreciable tendon retraction  Large joint arthrocentesis: R subacromial bursa  Universal Protocol:  Consent: Verbal consent obtained  Risks and benefits: risks, benefits and alternatives were discussed  Consent given by: patient  Time out: Immediately prior to procedure a "time out" was called to verify the correct patient, procedure, equipment, support staff and site/side marked as required  Patient understanding: patient states understanding of the procedure being performed  Patient consent: the patient's understanding of the procedure matches consent given  Procedure consent: procedure consent matches procedure scheduled  Relevant documents: relevant documents present and verified  Test results: test results available and properly labeled  Site marked: the operative site was marked  Radiology Images displayed and confirmed   If images not available, report reviewed: imaging studies available  Required items: required blood products, implants, devices, and special equipment available  Patient identity confirmed: verbally with patient    Supporting Documentation  Indications: pain   Procedure Details  Location: shoulder - R subacromial bursa  Preparation: Patient was prepped and draped in the usual sterile fashion  Needle size: 22 G  Ultrasound guidance: no  Approach: lateral  Medications administered: 4 mL bupivacaine 0 25 %; 2 mL bupivacaine 0 25 %; 40 mg triamcinolone acetonide 40 mg/mL    Patient tolerance: patient tolerated the procedure well with no immediate complications  Dressing:  Sterile dressing applied Patient/Caregiver provided printed discharge information.

## 2023-02-01 ENCOUNTER — TELEPHONE (OUTPATIENT)
Dept: OBGYN CLINIC | Facility: CLINIC | Age: 63
End: 2023-02-01

## 2023-02-01 LAB
LAB AP GYN PRIMARY INTERPRETATION: ABNORMAL
Lab: ABNORMAL
PATH INTERP SPEC-IMP: ABNORMAL

## 2023-02-01 NOTE — TELEPHONE ENCOUNTER
----- Message from Armando Harper MD sent at 2/1/2023 11:28 AM EST -----  Please call Sarah Paul regarding her abnormal result   She will need a colpo please

## 2023-02-02 RX ORDER — BUPIVACAINE HYDROCHLORIDE 2.5 MG/ML
4 INJECTION, SOLUTION INFILTRATION; PERINEURAL
Status: COMPLETED | OUTPATIENT
Start: 2023-01-31 | End: 2023-01-31

## 2023-02-02 RX ORDER — TRIAMCINOLONE ACETONIDE 40 MG/ML
40 INJECTION, SUSPENSION INTRA-ARTICULAR; INTRAMUSCULAR
Status: COMPLETED | OUTPATIENT
Start: 2023-01-31 | End: 2023-01-31

## 2023-02-02 RX ORDER — BUPIVACAINE HYDROCHLORIDE 2.5 MG/ML
2 INJECTION, SOLUTION INFILTRATION; PERINEURAL
Status: COMPLETED | OUTPATIENT
Start: 2023-01-31 | End: 2023-01-31

## 2023-02-07 DIAGNOSIS — F33.41 RECURRENT MAJOR DEPRESSIVE DISORDER, IN PARTIAL REMISSION (HCC): ICD-10-CM

## 2023-02-07 RX ORDER — DULOXETIN HYDROCHLORIDE 60 MG/1
CAPSULE, DELAYED RELEASE ORAL
Qty: 90 CAPSULE | Refills: 3 | Status: SHIPPED | OUTPATIENT
Start: 2023-02-07

## 2023-02-11 DIAGNOSIS — R13.19 ESOPHAGEAL DYSPHAGIA: ICD-10-CM

## 2023-02-11 DIAGNOSIS — K21.9 GASTROESOPHAGEAL REFLUX DISEASE WITHOUT ESOPHAGITIS: ICD-10-CM

## 2023-02-11 RX ORDER — PANTOPRAZOLE SODIUM 40 MG/1
TABLET, DELAYED RELEASE ORAL
Qty: 30 TABLET | Refills: 5 | Status: SHIPPED | OUTPATIENT
Start: 2023-02-11

## 2023-02-20 ENCOUNTER — OFFICE VISIT (OUTPATIENT)
Dept: INTERNAL MEDICINE CLINIC | Facility: CLINIC | Age: 63
End: 2023-02-20

## 2023-02-20 VITALS
WEIGHT: 180 LBS | SYSTOLIC BLOOD PRESSURE: 138 MMHG | BODY MASS INDEX: 29.99 KG/M2 | HEART RATE: 69 BPM | TEMPERATURE: 97.5 F | RESPIRATION RATE: 20 BRPM | OXYGEN SATURATION: 98 % | HEIGHT: 65 IN | DIASTOLIC BLOOD PRESSURE: 80 MMHG

## 2023-02-20 DIAGNOSIS — E66.3 OVERWEIGHT: Primary | ICD-10-CM

## 2023-02-20 PROBLEM — E66.9 OBESITY (BMI 30-39.9): Status: RESOLVED | Noted: 2023-01-26 | Resolved: 2023-02-20

## 2023-02-20 NOTE — PROGRESS NOTES
INTERNAL MEDICINE FOLLOW-UP OFFICE VISIT  [facility]    NAME: Dov Ricketts  AGE: 61 y o  SEX: female  : 1960   MRN: [id number]    DATE: 2023  TIME: 11:26 AM    Assessment and Plan     Diagnoses and all orders for this visit:    Overweight  -     Semaglutide-Weight Management (WEGOVY) 0 25 MG/0 5ML; Inject 0 5 mL (0 25 mg total) under the skin once a week        - Counseling Documentation: patient was counseled regarding: instructions for management, risk factor reductions, prognosis, patient and family education, risks and benefits of treatment options and importance of compliance with treatment  - Medication Side Effects: Adverse side effects of medications were reviewed with the patient/guardian today  Return to office in: As needed    Chief Complaint     Chief Complaint   Patient presents with   • Weight Loss       History of Present Illness     HPI  Patient was here to get treated for overweight  I discussed all the side effects and black warning on the medication before prescribing but she said she wanted to try it      The following portions of the patient's history were reviewed and updated as appropriate: allergies, current medications, past family history, past medical history, past social history, past surgical history and problem list     Review of Systems     Review of Systems   Constitutional: Negative for chills, diaphoresis, fatigue and fever  HENT: Negative for congestion, ear discharge, ear pain, hearing loss, postnasal drip, rhinorrhea, sinus pressure, sinus pain, sneezing, sore throat and voice change  Eyes: Negative for pain, discharge, redness and visual disturbance  Respiratory: Negative for cough, chest tightness, shortness of breath and wheezing  Cardiovascular: Negative for chest pain, palpitations and leg swelling     Gastrointestinal: Negative for abdominal distention, abdominal pain, blood in stool, constipation, diarrhea, nausea and vomiting  Endocrine: Negative for cold intolerance, heat intolerance, polydipsia, polyphagia and polyuria  Genitourinary: Negative for dysuria, flank pain, frequency, hematuria and urgency  Musculoskeletal: Negative for arthralgias, back pain, gait problem, joint swelling, myalgias, neck pain and neck stiffness  Skin: Negative for rash  Neurological: Negative for dizziness, tremors, syncope, facial asymmetry, speech difficulty, weakness, light-headedness, numbness and headaches  Hematological: Does not bruise/bleed easily  Psychiatric/Behavioral: Negative for behavioral problems, confusion and sleep disturbance  The patient is not nervous/anxious  Active Problem List     Patient Active Problem List   Diagnosis   • Acquired hypothyroidism   • Essential hypertension   • Recurrent major depressive disorder, in partial remission (HCC)   • Mixed hyperlipidemia   • Gastroesophageal reflux disease without esophagitis   • Opioid dependence ([address] )   • Hyperglycemia   • Overweight   • Cigarette nicotine dependence without complication   • Chronic pain of right knee   • Chronic bilateral low back pain without sciatica   • Peripheral vascular disease (HCC)   • SOB (shortness of breath)   • Esophageal dysphagia   • History of colon polyps   • DDD (degenerative disc disease), cervical   • Chronic hepatitis C without hepatic coma (HCC)   • Impingement syndrome of right shoulder   • Numbness of face       Objective     /80 (BP Location: Left arm, Patient Position: Sitting, Cuff Size: Standard)   Pulse 69   Temp 97 5 °F (36 4 °C) (Temporal)   Resp 20   Ht 5' 5" (1 651 m)   Wt 81 6 kg (180 lb)   SpO2 98%   BMI 29 95 kg/m²     Physical Exam  Constitutional:       General: She is not in acute distress  Appearance: She is well-developed  She is not diaphoretic  HENT:      Head: Normocephalic and atraumatic        Right Ear: External ear normal       Left Ear: External ear normal  Nose: Nose normal    Eyes:      General: No scleral icterus  Right eye: No discharge  Left eye: No discharge  Conjunctiva/sclera: Conjunctivae normal    Neck:      Thyroid: No thyromegaly  Vascular: No JVD  Trachea: No tracheal deviation  Cardiovascular:      Rate and Rhythm: Normal rate and regular rhythm  Heart sounds: Normal heart sounds  No murmur heard  No friction rub  No gallop  Pulmonary:      Effort: Pulmonary effort is normal  No respiratory distress  Breath sounds: Normal breath sounds  No wheezing or rales  Chest:      Chest wall: No tenderness  Abdominal:      General: Bowel sounds are normal  There is no distension  Palpations: Abdomen is soft  Tenderness: There is no abdominal tenderness  There is no guarding or rebound  Musculoskeletal:         General: No tenderness  Normal range of motion  Cervical back: Normal range of motion and neck supple  Lymphadenopathy:      Cervical: No cervical adenopathy  Skin:     General: Skin is warm and dry  Findings: No erythema or rash  Neurological:      Mental Status: She is alert and oriented to person, place, and time  Cranial Nerves: No cranial nerve deficit  Motor: No abnormal muscle tone        Coordination: Coordination normal    Psychiatric:         Judgment: Judgment normal            Current Medications       Current Outpatient Medications:   •  albuterol (PROVENTIL HFA,VENTOLIN HFA) 90 mcg/act inhaler, Inhale 1 puff every 6 (six) hours as needed for wheezing, Disp: 18 g, Rfl: 3  •  ammonium lactate (LAC-HYDRIN) 12 % lotion, Apply topically 2 (two) times a day as needed for dry skin, Disp: 400 g, Rfl: 0  •  aspirin 81 MG tablet, Take 1 tablet by mouth daily, Disp: , Rfl:   •  atorvastatin (LIPITOR) 20 mg tablet, TAKE 1 TABLET BY MOUTH EVERY DAY, Disp: 90 tablet, Rfl: 2  •  DULoxetine (CYMBALTA) 60 mg delayed release capsule, TAKE 1 CAPSULE BY MOUTH EVERY DAY, Disp: 90 capsule, Rfl: 3  •  gabapentin (NEURONTIN) 300 mg capsule, Take 2 capsules (600 mg total) by mouth 2 (two) times a day, Disp: 360 capsule, Rfl: 2  •  hydrochlorothiazide (HYDRODIURIL) 25 mg tablet, Take 1 tablet (25 mg total) by mouth daily, Disp: 90 tablet, Rfl: 2  •  hydrocortisone 1 % cream, Apply topically 4 (four) times a day as needed for rash, Disp: 30 g, Rfl: 0  •  ibuprofen (MOTRIN) 800 mg tablet, Take 800 mg by mouth 3 (three) times a day as needed, Disp: , Rfl:   •  Klor-Con M20 20 MEQ tablet, TAKE 1 TABLET BY MOUTH EVERY DAY, Disp: 90 tablet, Rfl: 2  •  levothyroxine 175 mcg tablet, Take 1 tablet (175 mcg total) by mouth daily, Disp: 90 tablet, Rfl: 1  •  lisinopril (ZESTRIL) 10 mg tablet, TAKE 1 TABLET BY MOUTH EVERY DAY, Disp: 90 tablet, Rfl: 1  •  pantoprazole (PROTONIX) 40 mg tablet, TAKE 1 TABLET BY MOUTH EVERY DAY, Disp: 30 tablet, Rfl: 5  •  pseudoephedrine (SUDAFED) 30 mg tablet, Take 60 mg by mouth every 4 (four) hours as needed for congestion, Disp: , Rfl:   •  SUBOXONE 8-2 MG, USE 2 & 1/2 FILMS UNDER TONGUE DAILY, Disp: , Rfl: 0    Health Maintenance     Health Maintenance   Topic Date Due   • Hepatitis A Vaccine (1 of 2 - Risk 2-dose series) Never done   • Pneumococcal Vaccine: Pediatrics (0 to 5 Years) and At-Risk Patients (6 to 64 Years) (1 - PCV) 01/14/1966   • DTaP,Tdap,and Td Vaccines (1 - Tdap) 01/14/1981   • Lung Cancer Screening  Never done   • Hepatitis B Vaccine (1 of 3 - Risk 3-dose series) Never done   • COVID-19 Vaccine (3 - Booster for Pfizer series) 07/29/2021   • Breast Cancer Screening: Mammogram  08/24/2022   • Depression Remission PHQ  06/13/2023   • Annual Physical  01/24/2024   • BMI: Followup Plan  01/26/2024   • BMI: Adult  02/20/2024   • Cervical Cancer Screening  01/24/2026   • Colorectal Cancer Screening  09/26/2026   • HIV Screening  Completed   • Influenza Vaccine  Completed   • HIB Vaccine  Aged Out   • IPV Vaccine  Aged Out   • Meningococcal ACWY Vaccine  Aged Out   • HPV Vaccine  Aged Out   • Hepatitis C Screening  Discontinued     Immunization History   Administered Date(s) Administered   • COVID-19 PFIZER VACCINE 0 3 ML IM 05/12/2021, 06/03/2021   • INFLUENZA 11/16/2018   • Influenza, recombinant, quadrivalent,injectable, preservative free 11/16/2018, 09/23/2020, 10/04/2021, 12/13/2022   • Influenza, seasonal, injectable 1960   • Pneumococcal Polysaccharide PPV23 1960   • Tdap 1960   • Zoster 1960         Miguel Watkins MD  [id number] [facility]

## 2023-03-01 ENCOUNTER — HOSPITAL ENCOUNTER (OUTPATIENT)
Dept: CT IMAGING | Facility: CLINIC | Age: 63
Discharge: HOME | End: 2023-03-01

## 2023-03-01 ENCOUNTER — HOSPITAL ENCOUNTER (OUTPATIENT)
Dept: MRI IMAGING | Facility: CLINIC | Age: 63
Discharge: HOME | End: 2023-03-01

## 2023-03-01 ENCOUNTER — HOSPITAL ENCOUNTER (OUTPATIENT)
Dept: ULTRASOUND IMAGING | Facility: CLINIC | Age: 63
Discharge: HOME | End: 2023-03-01

## 2023-03-01 ENCOUNTER — TELEPHONE (OUTPATIENT)
Dept: INTERNAL MEDICINE CLINIC | Facility: CLINIC | Age: 63
End: 2023-03-01

## 2023-03-01 DIAGNOSIS — F17.210 SMOKING GREATER THAN 20 PACK YEARS: ICD-10-CM

## 2023-03-01 DIAGNOSIS — K86.2 PANCREATIC CYST: Primary | ICD-10-CM

## 2023-03-01 DIAGNOSIS — Z12.2 ENCOUNTER FOR SCREENING FOR LUNG CANCER: ICD-10-CM

## 2023-03-01 DIAGNOSIS — R14.0 ABDOMINAL DISTENSION: ICD-10-CM

## 2023-03-01 DIAGNOSIS — K83.8 COMMON BILE DUCT DILATATION: ICD-10-CM

## 2023-03-01 DIAGNOSIS — R20.0 NUMBNESS OF FACE: ICD-10-CM

## 2023-03-01 RX ADMIN — GADOBUTROL 8 ML: 604.72 INJECTION INTRAVENOUS at 09:40

## 2023-03-03 ENCOUNTER — TELEPHONE (OUTPATIENT)
Dept: INTERNAL MEDICINE CLINIC | Facility: CLINIC | Age: 63
End: 2023-03-03

## 2023-03-03 NOTE — TELEPHONE ENCOUNTER
----- Message from Brad Scanlon DO sent at 3/3/2023 11:41 AM EST -----  MRI brain show chronic changes in the brain due to hypertension but no acute changes or reason to cause numbness   Pt is to follow up with PCP in 2-3 weeks for next steps if there is a continued concern

## 2023-03-08 ENCOUNTER — TELEPHONE (OUTPATIENT)
Dept: INTERNAL MEDICINE CLINIC | Facility: CLINIC | Age: 63
End: 2023-03-08

## 2023-03-08 NOTE — TELEPHONE ENCOUNTER
Waiting to hear about a prior auth for  Semaglutide-Weight Management (WEGOVY) 0 25 MG/0 5ML    States its been 2 1/2 weeks

## 2023-03-13 NOTE — TELEPHONE ENCOUNTER
Prior Authorization started for Semaglutide-Weight Management (WEGOVY) 0 25 MG/0 5ML      Via covermymeds, Key: [id number]  Waiting for payor determination

## 2023-03-18 ENCOUNTER — TELEPHONE (OUTPATIENT)
Dept: OTHER | Facility: OTHER | Age: 63
End: 2023-03-18

## 2023-03-18 NOTE — TELEPHONE ENCOUNTER
PT  Called in to notify she received a letter in the mail stating she needed a MRI urgently  PT has not been able to get in contact with office all week  She is requesting a script sent into diagnostics center for MRI and then a call back to confirm

## 2023-03-20 ENCOUNTER — TELEPHONE (OUTPATIENT)
Dept: OTHER | Facility: OTHER | Age: 63
End: 2023-03-20

## 2023-03-21 ENCOUNTER — PROCEDURE VISIT (OUTPATIENT)
Dept: OBGYN CLINIC | Age: 63
End: 2023-03-21

## 2023-03-21 VITALS
BODY MASS INDEX: 30.49 KG/M2 | DIASTOLIC BLOOD PRESSURE: 84 MMHG | SYSTOLIC BLOOD PRESSURE: 162 MMHG | WEIGHT: 183 LBS | HEIGHT: 65 IN

## 2023-03-21 DIAGNOSIS — R87.810 ASCUS WITH POSITIVE HIGH RISK HPV CERVICAL: Primary | ICD-10-CM

## 2023-03-21 DIAGNOSIS — R87.610 ASCUS WITH POSITIVE HIGH RISK HPV CERVICAL: Primary | ICD-10-CM

## 2023-03-21 NOTE — PROGRESS NOTES
Colposcopy     Date/Time 3/21/2023 11:04 AM     Universal Protocol   Consent: Verbal consent obtained  Risks and benefits: risks, benefits and alternatives were discussed  Consent given by: patient  Time out: Immediately prior to procedure a "time out" was called to verify the correct patient, procedure, equipment, support staff and site/side marked as required  Patient understanding: patient states understanding of the procedure being performed  Test results: test results available and properly labeled  Required items: required blood products, implants, devices, and special equipment available  Patient identity confirmed: verbally with patient       Performed by  Sherrie Hogue MD     Authorized by Sherrie Hogue MD        Pre-procedure details     Pre-procedure timeout performed: yes      Prepped with: acetic acid       Indication    ASC-US     Procedure Details   Procedure: Colposcopy w/ cervical biopsy and ECC      [place] speculum was placed in the vagina: yes      Under colposcopic examination the transition zone was seen in entirety: no      Endocervix was curetted using a Kevorkian curette: yes      Cervical biopsy performed with a cervical biopsy punch: yes      Monsel's solution was applied: yes      Biopsy(s): yes      Location:  3, ECC    Specimen to pathology: yes       Post-procedure      Impression: Low grade cervical dysplasia      Patient tolerance of procedure:   Tolerated well, no immediate complications

## 2023-03-21 NOTE — PROGRESS NOTES
Assessment/Plan:       Problem List Items Addressed This Visit    None      - etiology, progression, management of cervical dysplasia reviewed  - impression normal  - discussed that, with CIN1 on biopsies, would continue Pap  With VELVET 2-3, would recommend excision   - recommend smoking cessation      Subjective:      Patient ID: Forrest Camp is a 61 y o  female  HPI  She presents today for colposcopy  Was very surprised by her abnormal pap as she has had normal Paps  Upset by presence of HPV, concerned about getting cancer  She has never had a colposcopy before  The following portions of the patient's history were reviewed and updated as appropriate: allergies, current medications, past family history, past medical history, past social history, past surgical history and problem list     Review of Systems  neg as reviewed    Objective:  /84 (BP Location: Right arm, Patient Position: Sitting, Cuff Size: Large)   Ht 5' 5" (1 651 m)   Wt 83 kg (183 lb)   BMI 30 45 kg/m²      Physical Exam  Vitals reviewed  Constitutional:       Appearance: She is well-developed  HENT:      Head: Normocephalic  Cardiovascular:      Rate and Rhythm: Normal rate  Pulmonary:      Effort: Pulmonary effort is normal    Abdominal:      General: There is no distension  Palpations: Abdomen is soft  Tenderness: There is no abdominal tenderness  There is no guarding or rebound  Genitourinary:     General: Normal vulva  Exam position: Lithotomy position  Vagina: No bleeding  Cervix: No cervical motion tenderness  Comments: Cervix flush with vaginal wall  tz not seen at all  Fine vessels noted at 3 o clock  Musculoskeletal:         General: Normal range of motion  Cervical back: Normal range of motion  Skin:     General: Skin is warm and dry  Neurological:      Mental Status: She is alert and oriented to person, place, and time     Psychiatric:         Behavior: Behavior normal

## 2023-03-29 ENCOUNTER — TELEPHONE (OUTPATIENT)
Dept: OBGYN CLINIC | Facility: CLINIC | Age: 63
End: 2023-03-29

## 2023-04-08 DIAGNOSIS — E87.6 HYPOKALEMIA: ICD-10-CM

## 2023-04-08 RX ORDER — POTASSIUM CHLORIDE 1500 MG/1
TABLET, EXTENDED RELEASE ORAL
Qty: 90 TABLET | Refills: 2 | Status: SHIPPED | OUTPATIENT
Start: 2023-04-08

## 2023-04-24 ENCOUNTER — OFFICE VISIT (OUTPATIENT)
Age: 63
End: 2023-04-24

## 2023-04-24 VITALS
HEIGHT: 65 IN | SYSTOLIC BLOOD PRESSURE: 140 MMHG | WEIGHT: 173.2 LBS | BODY MASS INDEX: 28.86 KG/M2 | RESPIRATION RATE: 17 BRPM | OXYGEN SATURATION: 97 % | DIASTOLIC BLOOD PRESSURE: 82 MMHG | TEMPERATURE: 97.2 F | HEART RATE: 62 BPM

## 2023-04-24 DIAGNOSIS — E78.2 MIXED HYPERLIPIDEMIA: ICD-10-CM

## 2023-04-24 DIAGNOSIS — E66.3 OVERWEIGHT: Primary | ICD-10-CM

## 2023-04-24 DIAGNOSIS — R73.9 HYPERGLYCEMIA: ICD-10-CM

## 2023-04-24 PROBLEM — K86.2 PANCREATIC CYST: Status: ACTIVE | Noted: 2023-04-24

## 2023-04-24 NOTE — PROGRESS NOTES
INTERNAL MEDICINE FOLLOW-UP OFFICE VISIT  [facility]    NAME: Matt Luke  AGE: 61 y o  SEX: female  : 1960   MRN: [id number]    DATE: 2023  TIME: 12:09 PM    Assessment and Plan     Diagnoses and all orders for this visit:    Overweight  -     Semaglutide-Weight Management (WEGOVY) 0 5 MG/0 5ML; Inject 0 5 mL (0 5 mg total) under the skin once a week  Will increase the dose of semaglutide to 0 5 mg weekly  Will reevaluate the weight loss in 2 months    Hyperglycemia  -     Hemoglobin A1C; Future    Mixed hyperlipidemia  -     CBC and differential; Future  -     Comprehensive metabolic panel; Future  -     Lipid panel; Future  -     TSH, 3rd generation; Future        - Counseling Documentation: patient was counseled regarding: diagnostic results, instructions for management, risk factor reductions, prognosis, patient and family education, risks and benefits of treatment options and importance of compliance with treatment  - Medication Side Effects: Adverse side effects of medications were reviewed with the patient/guardian today  Return to office in: 2 months    Chief Complaint     Chief Complaint   Patient presents with   • Follow-up     2 month        History of Present Illness     HPI  Patient is here for follow-up of her weight loss  She was started on semaglutide about 2 months ago and has lost 10 pounds  The following portions of the patient's history were reviewed and updated as appropriate: allergies, current medications, past family history, past medical history, past social history, past surgical history and problem list     Review of Systems     Review of Systems   Constitutional: Negative for chills, diaphoresis, fatigue and fever  HENT: Negative for congestion, ear discharge, ear pain, hearing loss, postnasal drip, rhinorrhea, sinus pressure, sinus pain, sneezing, sore throat and voice change      Eyes: Negative for pain, discharge, redness "and visual disturbance  Respiratory: Negative for cough, chest tightness, shortness of breath and wheezing  Cardiovascular: Negative for chest pain, palpitations and leg swelling  Gastrointestinal: Negative for abdominal distention, abdominal pain, blood in stool, constipation, diarrhea, nausea and vomiting  Endocrine: Negative for cold intolerance, heat intolerance, polydipsia, polyphagia and polyuria  Genitourinary: Negative for dysuria, flank pain, frequency, hematuria and urgency  Musculoskeletal: Negative for arthralgias, back pain, gait problem, joint swelling, myalgias, neck pain and neck stiffness  Skin: Negative for rash  Neurological: Negative for dizziness, tremors, syncope, facial asymmetry, speech difficulty, weakness, light-headedness, numbness and headaches  Hematological: Does not bruise/bleed easily  Psychiatric/Behavioral: Negative for behavioral problems, confusion and sleep disturbance  The patient is not nervous/anxious          Active Problem List     Patient Active Problem List   Diagnosis   • Acquired hypothyroidism   • Essential hypertension   • Recurrent major depressive disorder, in partial remission (HCC)   • Mixed hyperlipidemia   • Gastroesophageal reflux disease without esophagitis   • Opioid dependence ([facility] [address] )   • Hyperglycemia   • Overweight   • Cigarette nicotine dependence without complication   • Chronic pain of right knee   • Chronic bilateral low back pain without sciatica   • Peripheral vascular disease (HCC)   • SOB (shortness of breath)   • Esophageal dysphagia   • History of colon polyps   • DDD (degenerative disc disease), cervical   • Chronic hepatitis C without hepatic coma (HCC)   • Impingement syndrome of right shoulder   • Numbness of face   • Pancreatic cyst       Objective     /82 (BP Location: Right arm, Patient Position: Sitting, Cuff Size: Standard)   Pulse 62   Temp (!) 97 2 °F (36 2 °C) (Tympanic)   Resp 17   Ht 5' 5\" (1 651 m)   Wt " 78 6 kg (173 lb 3 2 oz)   SpO2 97%   BMI 28 82 kg/m²     Physical Exam  Constitutional:       General: She is not in acute distress  Appearance: She is well-developed  She is not diaphoretic  HENT:      Head: Normocephalic and atraumatic  Right Ear: External ear normal       Left Ear: External ear normal       Nose: Nose normal    Eyes:      General: No scleral icterus  Right eye: No discharge  Left eye: No discharge  Conjunctiva/sclera: Conjunctivae normal    Neck:      Thyroid: No thyromegaly  Vascular: No JVD  Trachea: No tracheal deviation  Cardiovascular:      Rate and Rhythm: Normal rate and regular rhythm  Heart sounds: Normal heart sounds  No murmur heard  No friction rub  No gallop  Pulmonary:      Effort: Pulmonary effort is normal  No respiratory distress  Breath sounds: Normal breath sounds  No wheezing or rales  Chest:      Chest wall: No tenderness  Abdominal:      General: Bowel sounds are normal  There is no distension  Palpations: Abdomen is soft  Tenderness: There is no abdominal tenderness  There is no guarding or rebound  Musculoskeletal:         General: No tenderness  Normal range of motion  Cervical back: Normal range of motion and neck supple  Lymphadenopathy:      Cervical: No cervical adenopathy  Skin:     General: Skin is warm and dry  Findings: No erythema or rash  Neurological:      Mental Status: She is alert and oriented to person, place, and time  Cranial Nerves: No cranial nerve deficit  Motor: No abnormal muscle tone        Coordination: Coordination normal    Psychiatric:         Judgment: Judgment normal              Current Medications       Current Outpatient Medications:   •  albuterol (PROVENTIL HFA,VENTOLIN HFA) 90 mcg/act inhaler, Inhale 1 puff every 6 (six) hours as needed for wheezing, Disp: 18 g, Rfl: 3  •  ammonium lactate (LAC-HYDRIN) 12 % lotion, Apply topically 2 (two) times a day as needed for dry skin, Disp: 400 g, Rfl: 0  •  aspirin 81 MG tablet, Take 1 tablet by mouth daily, Disp: , Rfl:   •  atorvastatin (LIPITOR) 20 mg tablet, TAKE 1 TABLET BY MOUTH EVERY DAY, Disp: 90 tablet, Rfl: 2  •  DULoxetine (CYMBALTA) 60 mg delayed release capsule, TAKE 1 CAPSULE BY MOUTH EVERY DAY, Disp: 90 capsule, Rfl: 3  •  gabapentin (NEURONTIN) 300 mg capsule, Take 2 capsules (600 mg total) by mouth 2 (two) times a day, Disp: 360 capsule, Rfl: 2  •  hydrochlorothiazide (HYDRODIURIL) 25 mg tablet, Take 1 tablet (25 mg total) by mouth daily, Disp: 90 tablet, Rfl: 2  •  hydrocortisone 1 % cream, Apply topically 4 (four) times a day as needed for rash, Disp: 30 g, Rfl: 0  •  levothyroxine 175 mcg tablet, Take 1 tablet (175 mcg total) by mouth daily, Disp: 90 tablet, Rfl: 1  •  lisinopril (ZESTRIL) 10 mg tablet, TAKE 1 TABLET BY MOUTH EVERY DAY, Disp: 90 tablet, Rfl: 1  •  pantoprazole (PROTONIX) 40 mg tablet, TAKE 1 TABLET BY MOUTH EVERY DAY, Disp: 90 tablet, Rfl: 1  •  pseudoephedrine (SUDAFED) 30 mg tablet, Take 60 mg by mouth every 4 (four) hours as needed for congestion, Disp: , Rfl:   •  Semaglutide-Weight Management (WEGOVY) 0 5 MG/0 5ML, Inject 0 5 mL (0 5 mg total) under the skin once a week, Disp: 2 mL, Rfl: 2  •  SUBOXONE 8-2 MG, USE 2 & 1/2 FILMS UNDER TONGUE DAILY, Disp: , Rfl: 0    Health Maintenance     Health Maintenance   Topic Date Due   • Hepatitis A Vaccine (1 of 2 - Risk 2-dose series) Never done   • Pneumococcal Vaccine: Pediatrics (0 to 5 Years) and At-Risk Patients (6 to 64 Years) (1 - PCV) 01/14/1966   • DTaP,Tdap,and Td Vaccines (1 - Tdap) 01/14/1981   • Hepatitis B Vaccine (1 of 3 - Risk 3-dose series) Never done   • COVID-19 Vaccine (3 - Booster for Pfizer series) 07/29/2021   • Breast Cancer Screening: Mammogram  08/24/2022   • Depression Remission PHQ  06/13/2023   • Annual Physical  01/24/2024   • BMI: Followup Plan  01/26/2024   • Lung Cancer Screening  03/01/2024   • BMI: Adult  03/21/2024   • Cervical Cancer Screening  01/24/2026   • Colorectal Cancer Screening  09/26/2026   • HIV Screening  Completed   • Influenza Vaccine  Completed   • HIB Vaccine  Aged Out   • IPV Vaccine  Aged Out   • Meningococcal ACWY Vaccine  Aged Out   • HPV Vaccine  Aged Out   • Hepatitis C Screening  Discontinued     Immunization History   Administered Date(s) Administered   • COVID-19 PFIZER VACCINE 0 3 ML IM 05/12/2021, 06/03/2021   • INFLUENZA 11/16/2018   • Influenza, recombinant, quadrivalent,injectable, preservative free 11/16/2018, 09/23/2020, 10/04/2021, 12/13/2022   • Influenza, seasonal, injectable 1960   • Pneumococcal Polysaccharide PPV23 1960   • Tdap 1960   • Zoster 1960         MD Hosea [facility]el[facility]

## 2023-05-03 ENCOUNTER — TELEPHONE (OUTPATIENT)
Age: 63
End: 2023-05-03

## 2023-05-10 ENCOUNTER — TELEPHONE (OUTPATIENT)
Age: 63
End: 2023-05-10

## 2023-05-10 NOTE — TELEPHONE ENCOUNTER
Left voice mssg in regards to Semaglutide-Weight Management (WEGOVY) 0 5 MG/0 5ML        Is approved from 3/21/2023 to 9/21/2023

## 2023-05-10 NOTE — TELEPHONE ENCOUNTER
Patient following up on prior auth for rx Semaglutide-Weight Management 0 5mg/0 5ml    Please contact pt when done      Ph# [phone]

## 2023-05-18 ENCOUNTER — TELEPHONE (OUTPATIENT)
Age: 63
End: 2023-05-18

## 2023-05-19 DIAGNOSIS — E66.3 OVERWEIGHT: ICD-10-CM

## 2023-05-19 NOTE — TELEPHONE ENCOUNTER
Patient LVM that med is still not at the pharmacy  But [address] [facility] was approved  Missed today's dose

## 2023-05-26 ENCOUNTER — APPOINTMENT (OUTPATIENT)
Age: 63
End: 2023-05-26

## 2023-05-26 DIAGNOSIS — E78.2 MIXED HYPERLIPIDEMIA: ICD-10-CM

## 2023-05-26 DIAGNOSIS — R73.9 HYPERGLYCEMIA: ICD-10-CM

## 2023-05-26 LAB
ALBUMIN SERPL BCP-MCNC: 3.9 G/DL (ref 3.5–5)
ALP SERPL-CCNC: 54 U/L (ref 46–116)
ALT SERPL W P-5'-P-CCNC: 29 U/L (ref 12–78)
ANION GAP SERPL CALCULATED.3IONS-SCNC: 0 MMOL/L (ref 4–13)
AST SERPL W P-5'-P-CCNC: 27 U/L (ref 5–45)
BASOPHILS # BLD AUTO: 0.04 THOUSANDS/ÂΜL (ref 0–0.1)
BASOPHILS NFR BLD AUTO: 1 % (ref 0–1)
BILIRUB SERPL-MCNC: 0.98 MG/DL (ref 0.2–1)
BUN SERPL-MCNC: 16 MG/DL (ref 5–25)
CALCIUM SERPL-MCNC: 9.7 MG/DL (ref 8.3–10.1)
CHLORIDE SERPL-SCNC: 103 MMOL/L (ref 96–108)
CHOLEST SERPL-MCNC: 100 MG/DL
CO2 SERPL-SCNC: 32 MMOL/L (ref 21–32)
CREAT SERPL-MCNC: 0.71 MG/DL (ref 0.6–1.3)
EOSINOPHIL # BLD AUTO: 0.07 THOUSAND/ÂΜL (ref 0–0.61)
EOSINOPHIL NFR BLD AUTO: 1 % (ref 0–6)
ERYTHROCYTE [DISTWIDTH] IN BLOOD BY AUTOMATED COUNT: 12 % (ref 11.6–15.1)
EST. AVERAGE GLUCOSE BLD GHB EST-MCNC: 108 MG/DL
GFR SERPL CREATININE-BSD FRML MDRD: 90 ML/MIN/1.73SQ M
GLUCOSE P FAST SERPL-MCNC: 127 MG/DL (ref 65–99)
HBA1C MFR BLD: 5.4 %
HCT VFR BLD AUTO: 46 % (ref 34.8–46.1)
HDLC SERPL-MCNC: 46 MG/DL
HGB BLD-MCNC: 15.9 G/DL (ref 11.5–15.4)
IMM GRANULOCYTES # BLD AUTO: 0.02 THOUSAND/UL (ref 0–0.2)
IMM GRANULOCYTES NFR BLD AUTO: 0 % (ref 0–2)
LDLC SERPL CALC-MCNC: 33 MG/DL (ref 0–100)
LYMPHOCYTES # BLD AUTO: 2.63 THOUSANDS/ÂΜL (ref 0.6–4.47)
LYMPHOCYTES NFR BLD AUTO: 30 % (ref 14–44)
MCH RBC QN AUTO: 32.2 PG (ref 26.8–34.3)
MCHC RBC AUTO-ENTMCNC: 34.6 G/DL (ref 31.4–37.4)
MCV RBC AUTO: 93 FL (ref 82–98)
MONOCYTES # BLD AUTO: 0.78 THOUSAND/ÂΜL (ref 0.17–1.22)
MONOCYTES NFR BLD AUTO: 9 % (ref 4–12)
NEUTROPHILS # BLD AUTO: 5.26 THOUSANDS/ÂΜL (ref 1.85–7.62)
NEUTS SEG NFR BLD AUTO: 59 % (ref 43–75)
NONHDLC SERPL-MCNC: 54 MG/DL
NRBC BLD AUTO-RTO: 0 /100 WBCS
PLATELET # BLD AUTO: 244 THOUSANDS/UL (ref 149–390)
PMV BLD AUTO: 10.6 FL (ref 8.9–12.7)
POTASSIUM SERPL-SCNC: 3.1 MMOL/L (ref 3.5–5.3)
PROT SERPL-MCNC: 7.7 G/DL (ref 6.4–8.4)
RBC # BLD AUTO: 4.94 MILLION/UL (ref 3.81–5.12)
SODIUM SERPL-SCNC: 135 MMOL/L (ref 135–147)
TRIGL SERPL-MCNC: 104 MG/DL
TSH SERPL DL<=0.05 MIU/L-ACNC: 0.07 UIU/ML (ref 0.45–4.5)
WBC # BLD AUTO: 8.8 THOUSAND/UL (ref 4.31–10.16)

## 2023-05-30 DIAGNOSIS — F33.41 RECURRENT MAJOR DEPRESSIVE DISORDER, IN PARTIAL REMISSION (HCC): ICD-10-CM

## 2023-05-30 RX ORDER — DULOXETIN HYDROCHLORIDE 60 MG/1
CAPSULE, DELAYED RELEASE ORAL
Qty: 90 CAPSULE | Refills: 4 | Status: SHIPPED | OUTPATIENT
Start: 2023-05-30

## 2023-06-01 ENCOUNTER — OFFICE VISIT (OUTPATIENT)
Age: 63
End: 2023-06-01

## 2023-06-01 VITALS
SYSTOLIC BLOOD PRESSURE: 140 MMHG | TEMPERATURE: 97.3 F | BODY MASS INDEX: 28.69 KG/M2 | WEIGHT: 172.2 LBS | DIASTOLIC BLOOD PRESSURE: 76 MMHG | HEART RATE: 64 BPM | HEIGHT: 65 IN

## 2023-06-01 DIAGNOSIS — F17.210 CIGARETTE NICOTINE DEPENDENCE WITHOUT COMPLICATION: ICD-10-CM

## 2023-06-01 DIAGNOSIS — E03.9 ACQUIRED HYPOTHYROIDISM: ICD-10-CM

## 2023-06-01 DIAGNOSIS — E78.2 MIXED HYPERLIPIDEMIA: ICD-10-CM

## 2023-06-01 DIAGNOSIS — I10 ESSENTIAL HYPERTENSION: Primary | ICD-10-CM

## 2023-06-01 DIAGNOSIS — R73.9 HYPERGLYCEMIA: ICD-10-CM

## 2023-06-01 DIAGNOSIS — L98.9 SKIN LESION: ICD-10-CM

## 2023-06-01 DIAGNOSIS — F33.41 RECURRENT MAJOR DEPRESSIVE DISORDER, IN PARTIAL REMISSION (HCC): ICD-10-CM

## 2023-06-01 NOTE — PROGRESS NOTES
INTERNAL MEDICINE OFFICE VISIT  [facility] AT [facility]  [address]  Tel: [phone]      NAME: Vera Cai  AGE: 61 y o  SEX: female  : 1960   MRN: [id number]    DATE: 2023  TIME: 8:51 AM      Assessment and Plan:  1  Essential hypertension  Continue lisinopril, blood pressure is stable    2  Mixed hyperlipidemia  Continue atorvastatin  - CBC and differential; Future  - Comprehensive metabolic panel; Future  - Lipid panel; Future  - TSH, 3rd generation; Future    3  Hyperglycemia  Was advised to continue a low carbohydrate diet  - Hemoglobin A1C; Future    4  Acquired hypothyroidism  TSH is lower this time but she does not want to change the dose of the levothyroxine  We will continue the same dose for now and repeat labs in 4 months  5  Recurrent major depressive disorder, in partial remission (HCC)  Continue Cymbalta    6  Cigarette nicotine dependence without complication    Tobacco Cessation Counseling: Tobacco cessation counseling was provided  The patient is sincerely urged to quit consumption of tobacco  She is not ready to quit tobacco             - Counseling Documentation: patient was counseled regarding: diagnostic results, instructions for management, risk factor reductions, prognosis, patient and family education, risks and benefits of treatment options and importance of compliance with treatment  - Medication Side Effects: Adverse side effects of medications were reviewed with the patient/guardian today  Return for follow up visit in 4 months or earlier, if needed  Chief Complaint:  Chief Complaint   Patient presents with   • Follow-up     REVIEW LABS         History of Present Illness:   Blood pressure is stable but her potassium is low this time and she was advised to take the potassium pills which she has not been taking regularly    Cholesterol is stable and A1c is well controlled  TSH is on the lower side but she does not want me to change the dose of the medication  She continues to smoke  She had a skin lesion on the right thigh and needs to see the dermatologist   She is upset because Montey Pippins is on backorder and she wants to take it to lose weight        Active Problem List:  Patient Active Problem List   Diagnosis   • Acquired hypothyroidism   • Essential hypertension   • Recurrent major depressive disorder, in partial remission (HCC)   • Mixed hyperlipidemia   • Gastroesophageal reflux disease without esophagitis   • Opioid dependence ([address] )   • Hyperglycemia   • Overweight   • Cigarette nicotine dependence without complication   • Chronic pain of right knee   • Chronic bilateral low back pain without sciatica   • Peripheral vascular disease (HCC)   • SOB (shortness of breath)   • Esophageal dysphagia   • History of colon polyps   • DDD (degenerative disc disease), cervical   • Chronic hepatitis C without hepatic coma (HCC)   • Impingement syndrome of right shoulder   • Numbness of face   • Pancreatic cyst         Past Medical History:  Past Medical History:   Diagnosis Date   • Abnormal ECG    • Abnormal Pap smear of cervix    • Denial    • Heart aneurysm    • Heart murmur    • Hypertension    • Hypothyroidism    • Infectious viral hepatitis     hep c   • Obesity (BMI 30-39 9) 2023   • Overweight 2019   • PAD (peripheral artery disease) (HCC)          Past Surgical History:  Past Surgical History:   Procedure Laterality Date   • AUGMENTATION MAMMAPLASTY     •  SECTION     • COLONOSCOPY  2021   • ENDOSCOPY OF POUCH  2021         Family History:  Family History   Problem Relation Age of Onset   • Other Mother         Denial   • Vaginal cancer Mother 66   • Coronary artery disease Father    • Other Father         Denial   • Celiac disease Son    • Celiac disease Son    • Breast cancer Maternal Aunt          Social History:  Social History     Socioeconomic History   • Marital status:      Spouse name: None   • Number of children: None   • Years of education: None   • Highest education level: None   Occupational History   • None   Tobacco Use   • Smoking status: Every Day     Packs/day: 1 00     Years: 20 00     Total pack years: 20 00     Types: Cigarettes   • Smokeless tobacco: Never   Vaping Use   • Vaping Use: Every day   • Substances: Nicotine   Substance and Sexual Activity   • Alcohol use: Yes     Alcohol/week: 2 0 standard drinks of alcohol     Types: 1 Glasses of wine, 1 Cans of beer per week   • Drug use: No   • Sexual activity: Not Currently     Partners: Male   Other Topics Concern   • None   Social History Narrative   • None     Social Determinants of Health     Financial Resource Strain: Not on file   Food Insecurity: Not on file   Transportation Needs: Not on file   Physical Activity: Inactive (10/4/2021)    Exercise Vital Sign    • Days of Exercise per Week: 0 days    • Minutes of Exercise per Session: 0 min   Stress: Stress Concern Present (10/4/2021)    [address]    • Feeling of Stress : Rather much   Social Connections: Not on file   Intimate Partner Violence: Not on file   Housing Stability: Not on file         Allergies:   Allergies   Allergen Reactions   • Penicillins Itching         Medications:    Current Outpatient Medications:   •  albuterol (PROVENTIL HFA,VENTOLIN HFA) 90 mcg/act inhaler, Inhale 1 puff every 6 (six) hours as needed for wheezing, Disp: 18 g, Rfl: 3  •  ammonium lactate (LAC-HYDRIN) 12 % lotion, Apply topically 2 (two) times a day as needed for dry skin, Disp: 400 g, Rfl: 0  •  aspirin 81 MG tablet, Take 1 tablet by mouth daily, Disp: , Rfl:   •  atorvastatin (LIPITOR) 20 mg tablet, TAKE 1 TABLET BY MOUTH EVERY DAY, Disp: 90 tablet, Rfl: 2  •  DULoxetine (CYMBALTA) 60 mg delayed release capsule, TAKE 1 CAPSULE BY MOUTH EVERY DAY, Disp: 90 capsule, Rfl: 4  •  gabapentin (NEURONTIN) 300 mg capsule, Take 2 capsules (600 mg total) by mouth 2 (two) times a day, Disp: 360 capsule, Rfl: 2  •  hydrochlorothiazide (HYDRODIURIL) 25 mg tablet, Take 1 tablet (25 mg total) by mouth daily, Disp: 90 tablet, Rfl: 2  •  hydrocortisone 1 % cream, Apply topically 4 (four) times a day as needed for rash, Disp: 30 g, Rfl: 0  •  levothyroxine 175 mcg tablet, Take 1 tablet (175 mcg total) by mouth daily, Disp: 90 tablet, Rfl: 1  •  lisinopril (ZESTRIL) 10 mg tablet, TAKE 1 TABLET BY MOUTH EVERY DAY, Disp: 90 tablet, Rfl: 1  •  pantoprazole (PROTONIX) 40 mg tablet, TAKE 1 TABLET BY MOUTH EVERY DAY, Disp: 90 tablet, Rfl: 1  •  pseudoephedrine (SUDAFED) 30 mg tablet, Take 60 mg by mouth every 4 (four) hours as needed for congestion, Disp: , Rfl:   •  Semaglutide-Weight Management (WEGOVY) 0 5 MG/0 5ML, Inject 0 5 mL (0 5 mg total) under the skin once a week, Disp: 2 mL, Rfl: 2  •  SUBOXONE 8-2 MG, USE 2 & 1/2 FILMS UNDER TONGUE DAILY, Disp: , Rfl: 0      The following portions of the patient's history were reviewed and updated as appropriate: past medical history, past surgical history, family history, social history, allergies, current medications and active problem list       Review of Systems:  Constitutional: Denies fever, chills, weight gain, weight loss, fatigue  Eyes: Denies eye redness, eye discharge, double vision, change in visual acuity  ENT: Denies hearing loss, tinnitus, sneezing, nasal congestion, nasal discharge, sore throat   Respiratory: Denies cough, expectoration, hemoptysis, shortness of breath, wheezing  Cardiovascular: Denies chest pain, palpitations, lower extremity swelling, orthopnea, PND  Gastrointestinal: Denies abdominal pain, heartburn, nausea, vomiting, hematemesis, diarrhea, bloody stools  Genito-Urinary: Denies dysuria, frequency, difficulty in micturition, nocturia, incontinence  Musculoskeletal: Denies back pain, joint pain,has muscle pain  Neurologic: Denies confusion, lightheadedness, syncope, headache, focal weakness, sensory changes, seizures  Endocrine: Denies polyuria, polydipsia, temperature intolerance  Allergy and Immunology: Denies hives, insect bite sensitivity  Hematological and Lymphatic: Denies bleeding problems, swollen glands   Psychological: Denies depression, suicidal ideation, anxiety, panic, mood swings  Dermatological: Denies pruritus, rash, skin lesion changes      Vitals:  Vitals:    06/01/23 0836   BP: 140/76   Pulse: 64   Temp: (!) 97 3 °F (36 3 °C)       Body mass index is 28 66 kg/m²  Weight (last 2 days)     Date/Time Weight    06/01/23 0836 78 1 (172 2)            Physical Examination:  General: Patient is not in acute distress  Awake, alert, responding to commands  No weight gain or loss  Head: Normocephalic  Atraumatic  Eyes: Conjunctiva and lids with no swelling, erythema or discharge  Both pupils normal sized, round and reactive to light  Sclera nonicteric  ENT: External examination of nose and ear normal  Otoscopic examination shows translucent tympanic membranes with patent canals without erythema  Oropharynx moist with no erythema, edema, exudate or lesions  Neck: Supple  JVP not raised  Trachea midline  No masses  No thyromegaly  Lungs: No signs of increased work of breathing or respiratory distress  Bilateral bronchovascular breath sounds with no crackles or rhonchi  Chest wall: No tenderness  Cardiovascular: Normal PMI  No thrills  Regular rate and rhythm  S1 and S2 normal  No murmur, rub or gallop  Gastrointestinal: Abdomen soft, nontender  No guarding or rigidity  Liver and spleen not palpable  Bowel sounds present  Neurologic: Cranial nerves II-XII intact   Cortical functions normal  Motor system - Reflexes 2+ and symmetrical  Sensations normal  Musculoskeletal: Gait normal  No joint tenderness  Integumentary: Skin normal with no rash or lesions  Lymphatic: No palpable lymph nodes in neck, axilla or groin  Extremities: No clubbing, "cyanosis, edema or varicosities  Psychological: Judgement and insight normal  Mood and affect normal      Laboratory Results:  CBC with diff:   Lab Results   Component Value Date    HCT 46 0 05/26/2023    HGB 15 9 (H) 05/26/2023    MCH 32 2 05/26/2023    MCV 93 05/26/2023     05/26/2023    RBC 4 94 05/26/2023    RDW 12 0 05/26/2023    WBC 8 80 05/26/2023       CMP:  Lab Results   Component Value Date    ALKPHOS 54 05/26/2023    ALT 29 05/26/2023    AST 27 05/26/2023    BUN 16 05/26/2023     05/26/2023    CO2 32 05/26/2023    CREATININE 0 71 05/26/2023    K 3 1 (L) 05/26/2023       Lab Results   Component Value Date    HGBA1C 5 4 05/26/2023       No results found for: \"CKMB\", \"CKTOTAL\", \"TROPONINI\"    Lipid Profile:   No results found for: \"CHOL\"  Lab Results   Component Value Date    HDL 46 (L) 05/26/2023    HDL 52 12/29/2022     Lab Results   Component Value Date    LDLCALC 33 05/26/2023    LDLCALC 57 12/29/2022     Lab Results   Component Value Date    TRIG 104 05/26/2023    TRIG 189 (H) 12/29/2022       Imaging Results:  CT lung screening program  Narrative: CT CHEST LUNG CANCER SCREENING WITHOUT IV CONTRAST    INDICATION:   Z12 2: Encounter for screening for malignant neoplasm of respiratory organs  F17 210: Nicotine dependence, cigarettes, uncomplicated  COMPARISON: None  TECHNIQUE:  Unenhanced CT examination of the chest was performed utilizing a low dose protocol  Reformatted images were created in axial, sagittal, and coronal planes  Radiation dose length product (DLP) for this visit:  136 mGy-cm   This examination, like all CT scans performed in the [facility], was performed utilizing techniques to minimize radiation dose exposure, including the use of iterative   reconstruction and automated exposure control  FINDINGS:    LUNGS:  3 mm right upper lobe nodule (series 207, image 55) 2 mm left upper lobe nodule (series 207, image 27)     There is no tracheal or " endobronchial lesion  PLEURA:  Unremarkable  HEART/GREAT VESSELS: Heart is unremarkable for patient's age  No thoracic aortic aneurysm  MEDIASTINUM AND RAYSHAWN:  Unremarkable  CHEST WALL AND LOWER NECK:  Unremarkable  VISUALIZED STRUCTURES IN THE UPPER ABDOMEN:  Unremarkable  OSSEOUS STRUCTURES:  No acute fracture or destructive osseous lesion  Impression: No nodules and/or definitely benign nodules  Lung-RADS2, benign appearance or behavior  Continue annual screening with LDCT in 12 months       Workstation performed: [id number]       Health Maintenance:  Health Maintenance   Topic Date Due   • Pneumococcal Vaccine: Pediatrics (0 to 5 Years) and At-Risk Patients (6 to 59 Years) (1 - PCV) 01/14/1966   • Hepatitis A Vaccine (1 of 2 - Risk 2-dose series) Never done   • DTaP,Tdap,and Td Vaccines (1 - Tdap) 01/14/1981   • Hepatitis B Vaccine (1 of 3 - Risk 3-dose series) Never done   • COVID-19 Vaccine (3 - Pfizer series) 07/29/2021   • Breast Cancer Screening: Mammogram  08/24/2022   • Depression Remission PHQ  12/01/2023   • Annual Physical  01/24/2024   • BMI: Followup Plan  01/26/2024   • Lung Cancer Screening  03/01/2024   • BMI: Adult  04/24/2024   • Cervical Cancer Screening  01/24/2026   • Colorectal Cancer Screening  09/26/2026   • HIV Screening  Completed   • Influenza Vaccine  Completed   • HIB Vaccine  Aged Out   • IPV Vaccine  Aged Out   • Meningococcal ACWY Vaccine  Aged Out   • HPV Vaccine  Aged Out   • Hepatitis C Screening  Discontinued     Immunization History   Administered Date(s) Administered   • COVID-19 PFIZER VACCINE 0 3 ML IM 05/12/2021, 06/03/2021   • INFLUENZA 11/16/2018   • Influenza, recombinant, quadrivalent,injectable, preservative free 11/16/2018, 09/23/2020, 10/04/2021, 12/13/2022   • Influenza, seasonal, injectable 1960   • Pneumococcal Polysaccharide PPV23 1960   • Tdap 1960   • Zoster 1960         Jean Whitney MD  6/1/2023,8:51 AM

## 2023-07-06 DIAGNOSIS — E03.9 ACQUIRED HYPOTHYROIDISM: ICD-10-CM

## 2023-07-06 RX ORDER — LEVOTHYROXINE SODIUM 175 UG/1
TABLET ORAL
Qty: 90 TABLET | Refills: 1 | Status: SHIPPED | OUTPATIENT
Start: 2023-07-06

## 2023-07-14 RX ORDER — SEMAGLUTIDE 0.25 MG/.5ML
INJECTION, SOLUTION SUBCUTANEOUS
COMMUNITY
Start: 2023-04-18

## 2023-07-18 ENCOUNTER — LAB (OUTPATIENT)
Dept: LAB | Facility: HOSPITAL | Age: 63
End: 2023-07-18
Payer: COMMERCIAL

## 2023-07-18 ENCOUNTER — OFFICE VISIT (OUTPATIENT)
Dept: GASTROENTEROLOGY | Facility: CLINIC | Age: 63
End: 2023-07-18
Payer: COMMERCIAL

## 2023-07-18 VITALS
SYSTOLIC BLOOD PRESSURE: 126 MMHG | OXYGEN SATURATION: 96 % | HEIGHT: 65 IN | HEART RATE: 85 BPM | BODY MASS INDEX: 28.66 KG/M2 | WEIGHT: 172 LBS | DIASTOLIC BLOOD PRESSURE: 74 MMHG

## 2023-07-18 DIAGNOSIS — B18.2 CHRONIC HEPATITIS C WITHOUT HEPATIC COMA (HCC): Primary | ICD-10-CM

## 2023-07-18 DIAGNOSIS — E78.2 MIXED HYPERLIPIDEMIA: ICD-10-CM

## 2023-07-18 DIAGNOSIS — K83.8 COMMON BILE DUCT DILATATION: ICD-10-CM

## 2023-07-18 DIAGNOSIS — K86.2 PANCREATIC CYST: ICD-10-CM

## 2023-07-18 DIAGNOSIS — K21.9 GASTROESOPHAGEAL REFLUX DISEASE WITHOUT ESOPHAGITIS: ICD-10-CM

## 2023-07-18 DIAGNOSIS — R73.9 HYPERGLYCEMIA: ICD-10-CM

## 2023-07-18 DIAGNOSIS — B18.2 CHRONIC HEPATITIS C WITHOUT HEPATIC COMA (HCC): ICD-10-CM

## 2023-07-18 LAB
ALBUMIN SERPL BCP-MCNC: 4.5 G/DL (ref 3.5–5)
ALP SERPL-CCNC: 56 U/L (ref 34–104)
ALT SERPL W P-5'-P-CCNC: 17 U/L (ref 7–52)
ANION GAP SERPL CALCULATED.3IONS-SCNC: 4 MMOL/L
AST SERPL W P-5'-P-CCNC: 22 U/L (ref 13–39)
BASOPHILS # BLD AUTO: 0.03 THOUSANDS/ÂΜL (ref 0–0.1)
BASOPHILS NFR BLD AUTO: 0 % (ref 0–1)
BILIRUB SERPL-MCNC: 1.13 MG/DL (ref 0.2–1)
BUN SERPL-MCNC: 13 MG/DL (ref 5–25)
CALCIUM SERPL-MCNC: 10 MG/DL (ref 8.4–10.2)
CHLORIDE SERPL-SCNC: 104 MMOL/L (ref 96–108)
CHOLEST SERPL-MCNC: 117 MG/DL
CO2 SERPL-SCNC: 31 MMOL/L (ref 21–32)
CREAT SERPL-MCNC: 0.63 MG/DL (ref 0.6–1.3)
EOSINOPHIL # BLD AUTO: 0.11 THOUSAND/ÂΜL (ref 0–0.61)
EOSINOPHIL NFR BLD AUTO: 1 % (ref 0–6)
ERYTHROCYTE [DISTWIDTH] IN BLOOD BY AUTOMATED COUNT: 12.5 % (ref 11.6–15.1)
EST. AVERAGE GLUCOSE BLD GHB EST-MCNC: 117 MG/DL
GFR SERPL CREATININE-BSD FRML MDRD: 95 ML/MIN/1.73SQ M
GLUCOSE P FAST SERPL-MCNC: 116 MG/DL (ref 65–99)
HBA1C MFR BLD: 5.7 %
HCT VFR BLD AUTO: 46.1 % (ref 34.8–46.1)
HDLC SERPL-MCNC: 46 MG/DL
HGB BLD-MCNC: 15.7 G/DL (ref 11.5–15.4)
IMM GRANULOCYTES # BLD AUTO: 0.02 THOUSAND/UL (ref 0–0.2)
IMM GRANULOCYTES NFR BLD AUTO: 0 % (ref 0–2)
LDLC SERPL CALC-MCNC: 50 MG/DL (ref 0–100)
LYMPHOCYTES # BLD AUTO: 2.76 THOUSANDS/ÂΜL (ref 0.6–4.47)
LYMPHOCYTES NFR BLD AUTO: 35 % (ref 14–44)
MCH RBC QN AUTO: 31.4 PG (ref 26.8–34.3)
MCHC RBC AUTO-ENTMCNC: 34.1 G/DL (ref 31.4–37.4)
MCV RBC AUTO: 92 FL (ref 82–98)
MONOCYTES # BLD AUTO: 0.74 THOUSAND/ÂΜL (ref 0.17–1.22)
MONOCYTES NFR BLD AUTO: 9 % (ref 4–12)
NEUTROPHILS # BLD AUTO: 4.18 THOUSANDS/ÂΜL (ref 1.85–7.62)
NEUTS SEG NFR BLD AUTO: 55 % (ref 43–75)
NONHDLC SERPL-MCNC: 71 MG/DL
NRBC BLD AUTO-RTO: 0 /100 WBCS
PLATELET # BLD AUTO: 241 THOUSANDS/UL (ref 149–390)
PMV BLD AUTO: 9.7 FL (ref 8.9–12.7)
POTASSIUM SERPL-SCNC: 3.6 MMOL/L (ref 3.5–5.3)
PROT SERPL-MCNC: 7.8 G/DL (ref 6.4–8.4)
RBC # BLD AUTO: 5 MILLION/UL (ref 3.81–5.12)
SODIUM SERPL-SCNC: 139 MMOL/L (ref 135–147)
TRIGL SERPL-MCNC: 104 MG/DL
TSH SERPL DL<=0.05 MIU/L-ACNC: 0.09 UIU/ML (ref 0.45–4.5)
WBC # BLD AUTO: 7.84 THOUSAND/UL (ref 4.31–10.16)

## 2023-07-18 PROCEDURE — 87521 HEPATITIS C PROBE&RVRS TRNSC: CPT

## 2023-07-18 PROCEDURE — 80053 COMPREHEN METABOLIC PANEL: CPT

## 2023-07-18 PROCEDURE — 80061 LIPID PANEL: CPT

## 2023-07-18 PROCEDURE — 87522 HEPATITIS C REVRS TRNSCRPJ: CPT

## 2023-07-18 PROCEDURE — 83036 HEMOGLOBIN GLYCOSYLATED A1C: CPT

## 2023-07-18 PROCEDURE — 36415 COLL VENOUS BLD VENIPUNCTURE: CPT

## 2023-07-18 PROCEDURE — 85025 COMPLETE CBC W/AUTO DIFF WBC: CPT

## 2023-07-18 PROCEDURE — 99214 OFFICE O/P EST MOD 30 MIN: CPT | Performed by: INTERNAL MEDICINE

## 2023-07-18 PROCEDURE — 84443 ASSAY THYROID STIM HORMONE: CPT

## 2023-07-18 NOTE — PROGRESS NOTES
Follow-up Note -  [facility]  Olman Duncan 1960 61 y.o. female     ASSESSMENT @ PLAN:   She is a 80-year-old female with hepatitis C status post 8-week treatment with Mavyret needs to have SVR testing. In addition she had an ultrasound that showed possible uncinate pancreas cyst.    1 we will order MRCP MRI for her pancreatic cyst.  This will also allow us to look at her CBD. Its that it was mildly dilated at 9 mm. 2 check hepatitis C viral RNA level to determine if she has had an SVR    3 she will continue on her pantoprazole for treatment of her GERD. She had endoscopy colonoscopy with me in September 2021 the endoscopy showed LA grade a reflux esophagitis mild nonerosive gastritis the colonoscopy showed no polyps normal colonoscopy. Reason: GERD and hepatitis C and pancreas cyst    Present illness:    She is a 80-year-old female who is referred here for evaluation of pancreas cyst.  She had an abdominal ultrasound that showed a possible cyst in her pancreas and the uncinate process. It also showed a CBD diameter of 9 mm which would be just a little bit dilated for her age group. The patient denies abdominal pain. She denies heartburn regurgitation dysphagia she denies nausea or vomiting. She denies diarrhea constipation melena or hematochezia. She has GERD and is on pantoprazole and it is quiescent at present. She endoscopy colonoscopy with me on September 27, 2021. The endoscopy showed LA grade a reflux esophagitis mild nonerosive gastritis. Biopsies were negative for select and H. pylori. She had a colonoscopy in the same day that showed no polyps normal colon. She also has a history of genotype 1a hepatitis C FibroSure F1 F2 disease. She was treated with 8 weeks of Kari Stai and never went for her SVR viral RNA testing. REVIEW OF SYSTEMS:     CONSTITUTIONAL: Denies any fever, chills, or rigors. Good appetite, and no recent weight loss.   HEENT: No earache or tinnitus. Denies hearing loss or visual disturbances. CARDIOVASCULAR: No chest pain or palpitations. RESPIRATORY: Denies any cough, hemoptysis, shortness of breath or dyspnea on exertion. GASTROINTESTINAL: As noted in the History of Present Illness. GENITOURINARY: No problems with urination. Denies any hematuria or dysuria. NEUROLOGIC: No dizziness or vertigo, denies headaches. MUSCULOSKELETAL: Denies any muscle or joint pain. SKIN: Denies skin rashes or itching. ENDOCRINE: Denies excessive thirst. Denies intolerance to heat or cold. PSYCHOSOCIAL: Denies depression or anxiety. Denies any recent memory loss. Past Medical History:   Diagnosis Date   • Abnormal ECG    • Abnormal Pap smear of cervix    • Denial    • Heart aneurysm    • Heart murmur    • Hypertension    • Hypothyroidism    • Infectious viral hepatitis     hep c   • Obesity (BMI 30-39.9) 2023   • Overweight 2019   • PAD (peripheral artery disease) (HCC)       Past Surgical History:   Procedure Laterality Date   • AUGMENTATION MAMMAPLASTY     •  SECTION     • COLONOSCOPY  2021   • ENDOSCOPY OF [facility]  2021     Social History     Socioeconomic History   • Marital status:      Spouse name: Not on file   • Number of children: Not on file   • Years of education: Not on file   • Highest education level: Not on file   Occupational History   • Not on file   Tobacco Use   • Smoking status: Every Day     Packs/day: 1.00     Years: 20.00     Total pack years: 20.00     Types: Cigarettes   • Smokeless tobacco: Never   Vaping Use   • Vaping Use: Every day   • Substances: Nicotine   Substance and Sexual Activity   • Alcohol use:  Yes     Alcohol/week: 2.0 standard drinks of alcohol     Types: 1 Glasses of wine, 1 Cans of beer per week   • Drug use: No   • Sexual activity: Not Currently     Partners: Male   Other Topics Concern   • Not on file   Social History Narrative   • Not on file     Social Determinants of Health     Financial Resource Strain: Not on file   Food Insecurity: Not on file   Transportation Needs: Not on file   Physical Activity: Inactive (10/4/2021)    Exercise Vital Sign    • Days of Exercise per Week: 0 days    • Minutes of Exercise per Session: 0 min   Stress: Stress Concern Present (10/4/2021)    [address] [facility]    • Feeling of Stress : Rather much   Social Connections: Not on file   Intimate Partner Violence: Not on file   Housing Stability: Not on file     Family History   Problem Relation Age of Onset   • Other Mother         Denial   • Vaginal cancer Mother 66   • Coronary artery disease Father    • Other Father         Denial   • Celiac disease Son    • Celiac disease Son    • Breast cancer Maternal Aunt      Penicillins  Current Outpatient Medications   Medication Sig Dispense Refill   • albuterol (PROVENTIL HFA,VENTOLIN HFA) 90 mcg/act inhaler Inhale 1 puff every 6 (six) hours as needed for wheezing 18 g 3   • ammonium lactate (LAC-HYDRIN) 12 % lotion Apply topically 2 (two) times a day as needed for dry skin 400 g 0   • aspirin 81 MG tablet Take 1 tablet by mouth daily     • atorvastatin (LIPITOR) 20 mg tablet TAKE 1 TABLET BY MOUTH EVERY DAY 90 tablet 2   • DULoxetine (CYMBALTA) 60 mg delayed release capsule TAKE 1 CAPSULE BY MOUTH EVERY DAY 90 capsule 4   • gabapentin (NEURONTIN) 300 mg capsule Take 2 capsules (600 mg total) by mouth 2 (two) times a day 360 capsule 2   • hydrochlorothiazide (HYDRODIURIL) 25 mg tablet Take 1 tablet (25 mg total) by mouth daily 90 tablet 2   • hydrocortisone 1 % cream Apply topically 4 (four) times a day as needed for rash 30 g 0   • levothyroxine 175 mcg tablet TAKE 1 TABLET BY MOUTH EVERY DAY 90 tablet 1   • lisinopril (ZESTRIL) 10 mg tablet TAKE 1 TABLET BY MOUTH EVERY DAY 90 tablet 1   • pantoprazole (PROTONIX) 40 mg tablet TAKE 1 TABLET BY MOUTH EVERY DAY 90 tablet 1   • pseudoephedrine (SUDAFED) 30 mg tablet Take 60 mg by mouth every 4 (four) hours as needed for congestion     • Semaglutide-Weight Management (Wegovy) 0.25 MG/0.5ML INJECT 0.5 ML (0.25 MG TOTAL) UNDER THE SKIN ONCE A WEEK     • SUBOXONE 8-2 MG USE 2 & 1/2 FILMS UNDER TONGUE DAILY  0     No current facility-administered medications for this visit. Blood pressure 126/74, pulse 85, height 5' 5" (1.651 m), weight 78 kg (172 lb), SpO2 96 %. PHYSICAL EXAM:     General Appearance:   Alert, cooperative, no distress, appears stated age    HEENT:   Normocephalic, atraumatic, anicteric.     Neck:  Supple, symmetrical, trachea midline, no adenopathy;    thyroid: no enlargement/tenderness/nodules; no carotid  bruit or JVD    Lungs:   Clear to auscultation bilaterally; no rales, rhonchi or wheezing; respirations unlabored    Heart[de-identified]   S1 and S2 normal; regular rate and rhythm; no murmur, rub, or gallop.    Abdomen:   Soft, non-tender, non-distended; normal bowel sounds; no masses, no organomegaly    Genitalia:   Deferred    Rectal:   Deferred    Extremities:  No cyanosis, clubbing or edema    Pulses:  2+ and symmetric all extremities    Skin:  Skin color, texture, turgor normal, no rashes or lesions    Lymph nodes:  No palpable cervical, axillary or inguinal lymphadenopathy        Lab Results   Component Value Date    WBC 8.80 05/26/2023    HGB 15.9 (H) 05/26/2023    HCT 46.0 05/26/2023    MCV 93 05/26/2023     05/26/2023     Lab Results   Component Value Date    CALCIUM 9.7 05/26/2023    K 3.1 (L) 05/26/2023    CO2 32 05/26/2023     05/26/2023    BUN 16 05/26/2023    CREATININE 0.71 05/26/2023     Lab Results   Component Value Date    ALT 29 05/26/2023    AST 27 05/26/2023    GGT 55 03/22/2022    ALKPHOS 54 05/26/2023     No results found for: "INR", "PROTIME"

## 2023-07-19 ENCOUNTER — TELEPHONE (OUTPATIENT)
Dept: GASTROENTEROLOGY | Facility: CLINIC | Age: 63
End: 2023-07-19

## 2023-07-19 LAB — HCV RNA SERPL NAA+PROBE-ACNC: NOT DETECTED K[IU]/ML

## 2023-07-19 NOTE — RESULT ENCOUNTER NOTE
Please tell her that the hepatitis C virus level is negative. This means that she has been cured from her hepatitis C. Please tell her that I am very happy for her.

## 2023-07-19 NOTE — TELEPHONE ENCOUNTER
----- Message from Young Henry III, MD sent at 7/19/2023  1:03 PM EDT -----  Please tell her that the hepatitis C virus level is negative.  This means that she has been cured from her hepatitis C.  Please tell her that I am very happy for her.

## 2023-07-20 ENCOUNTER — TELEPHONE (OUTPATIENT)
Age: 63
End: 2023-07-20

## 2023-08-11 ENCOUNTER — HOSPITAL ENCOUNTER (OUTPATIENT)
Dept: MRI IMAGING | Facility: CLINIC | Age: 63
Discharge: HOME | End: 2023-08-11
Payer: COMMERCIAL

## 2023-08-11 DIAGNOSIS — K86.2 PANCREATIC CYST: ICD-10-CM

## 2023-08-11 DIAGNOSIS — K83.8 COMMON BILE DUCT DILATATION: ICD-10-CM

## 2023-08-11 PROCEDURE — G1004 CDSM NDSC: HCPCS

## 2023-08-11 PROCEDURE — 74183 MRI ABD W/O CNTR FLWD CNTR: CPT

## 2023-08-11 PROCEDURE — 76377 3D RENDER W/INTRP POSTPROCES: CPT

## 2023-08-11 PROCEDURE — A9585 GADOBUTROL INJECTION: HCPCS | Performed by: INTERNAL MEDICINE

## 2023-08-11 RX ORDER — GADOBUTROL 604.72 MG/ML
7 INJECTION INTRAVENOUS
Status: COMPLETED | OUTPATIENT
Start: 2023-08-11 | End: 2023-08-11

## 2023-08-11 RX ADMIN — GADOBUTROL 7 ML: 604.72 INJECTION INTRAVENOUS at 13:48

## 2023-08-22 ENCOUNTER — TELEPHONE (OUTPATIENT)
Age: 63
End: 2023-08-22

## 2023-08-22 NOTE — TELEPHONE ENCOUNTER
Will forward to Dr. Bentley Rodriguez to review, marked significant as it requires a follow-up. Thanks!

## 2023-08-22 NOTE — TELEPHONE ENCOUNTER
Patients GI provider:  Dr. Juan Carlos Vergara    Number to return call: [phone]    Reason for call: Felisa Cerda from radiology called with significant findings on the pt's MRI.  Tiger Text Sent    Scheduled procedure/appointment date if applicable: Appt 7/28/94 (dr Wilberto Black)

## 2023-08-25 ENCOUNTER — TELEPHONE (OUTPATIENT)
Dept: GASTROENTEROLOGY | Facility: CLINIC | Age: 63
End: 2023-08-25

## 2023-08-25 NOTE — TELEPHONE ENCOUNTER
I spoke to her at length.  We reviewed the MRI findings.  She has no weight loss no significant abdominal pain.  Her liver function tests are normal.    The MRI was ordered to follow-up on an ultrasound which showed a possible pancreatic cyst.  There is no pancreatic cyst seen.  Her duct is of normal size for her age.  It is upper limit of normal.  At present I do not think she needs to have an endoscopic ultrasound.

## 2023-08-25 NOTE — TELEPHONE ENCOUNTER
Called and spoke to patient.let her know that Dr Henry has been trying to call. She did not recognize #. She stated that Dr henry can call and she will answer phone will route a message to Dr henry.

## 2023-09-15 DIAGNOSIS — E78.2 MIXED HYPERLIPIDEMIA: ICD-10-CM

## 2023-09-15 RX ORDER — ATORVASTATIN CALCIUM 20 MG/1
TABLET, FILM COATED ORAL
Qty: 90 TABLET | Refills: 2 | Status: SHIPPED | OUTPATIENT
Start: 2023-09-15

## 2023-09-28 ENCOUNTER — TELEPHONE (OUTPATIENT)
Age: 63
End: 2023-09-28

## 2023-09-28 NOTE — TELEPHONE ENCOUNTER
Patient LVM she has a bad chest infection, mucus is very dark    Wants antibiotics called in, too sick to come in for an appt     My name is Ruddy Tamayo. My birthday is [birth year]. I'm a patient of Doctor Soto. I have a bad chest infection. My mucus is very dark. I want Doctor Wiliam Ackerman to call me in an antibiotic at [facility]. I'm actually really too sick to even come in. So if she could call that in and you could call me back at [phone], I'd appreciate it. Thank you.

## 2023-09-29 NOTE — TELEPHONE ENCOUNTER
I have no other suggestions; she is requesting a service that we cannot provide. We don't blindly prescribe antibiotics.

## 2023-10-01 ENCOUNTER — OFFICE VISIT (OUTPATIENT)
Age: 63
End: 2023-10-01
Payer: COMMERCIAL

## 2023-10-01 VITALS
TEMPERATURE: 97.7 F | RESPIRATION RATE: 18 BRPM | HEART RATE: 66 BPM | WEIGHT: 174 LBS | HEIGHT: 65 IN | DIASTOLIC BLOOD PRESSURE: 78 MMHG | BODY MASS INDEX: 28.99 KG/M2 | SYSTOLIC BLOOD PRESSURE: 126 MMHG | OXYGEN SATURATION: 97 %

## 2023-10-01 DIAGNOSIS — B97.89 ACUTE VIRAL SINUSITIS: Primary | ICD-10-CM

## 2023-10-01 DIAGNOSIS — J01.90 ACUTE VIRAL SINUSITIS: Primary | ICD-10-CM

## 2023-10-01 PROCEDURE — 99213 OFFICE O/P EST LOW 20 MIN: CPT | Performed by: STUDENT IN AN ORGANIZED HEALTH CARE EDUCATION/TRAINING PROGRAM

## 2023-10-01 RX ORDER — CETIRIZINE HYDROCHLORIDE 10 MG/1
10 TABLET ORAL DAILY
Qty: 10 TABLET | Refills: 0 | Status: SHIPPED | OUTPATIENT
Start: 2023-10-01 | End: 2023-10-11

## 2023-10-01 RX ORDER — BENZONATATE 100 MG/1
100 CAPSULE ORAL 3 TIMES DAILY PRN
Qty: 20 CAPSULE | Refills: 0 | Status: SHIPPED | OUTPATIENT
Start: 2023-10-01

## 2023-10-01 RX ORDER — FLUTICASONE PROPIONATE 50 MCG
1 SPRAY, SUSPENSION (ML) NASAL 2 TIMES DAILY
Qty: 11.1 ML | Refills: 0 | Status: SHIPPED | OUTPATIENT
Start: 2023-10-01

## 2023-10-01 RX ORDER — PSEUDOEPHEDRINE HCL 120 MG/1
120 TABLET, FILM COATED, EXTENDED RELEASE ORAL 2 TIMES DAILY
Qty: 10 TABLET | Refills: 0 | Status: SHIPPED | OUTPATIENT
Start: 2023-10-01

## 2023-10-01 NOTE — PROGRESS NOTES
[facility] Now        NAME: Abel Tabares is a 61 y.o. female  : 1960    MRN: [id number]  DATE: 2023  TIME: 10:17 AM    Assessment and Orders   Acute viral sinusitis [J01.90, B97.89]  1. Acute viral sinusitis  pseudoephedrine (SUDAFED) 120 MG 12 hr tablet    cetirizine (ZyrTEC) 10 mg tablet    fluticasone (FLONASE) 50 mcg/act nasal spray    benzonatate (TESSALON PERLES) 100 mg capsule            Plan and Discussion      Symptoms and exam consistent with acute viral infection. Will treat symptomatically with medications listed above. AAFP Article from - Clinical Diagnosis of Acute Bacterial Rhinosinusitis  SEBASTIAN LEUNG, [facility], [facility], AND CAROLYN CORTEZ MD, [facility] [employer] Board, [address] [place] [address] of Surgeons in [facility], [facility], [facility]     Acute rhinosinusitis in adults is defined as sinonasal inflammation that lasts less than four weeks and is associated with the sudden onset of symptoms. 1 In the 2012 [address], 12% of respondents reported being diagnosed with rhinosinusitis in the previous 12 months. 2 In 2016, there were 8 million [place] ambulatory visits for acute sinusitis. 3 Acute bacterial rhinosinusitis develops in only 0.5% to 2% of all upper respiratory tract infections. 4  A 2018 [facility] review demonstrated that the potential benefit of antibiotics for the treatment of acute rhinosinusitis, diagnosed clinically or confirmed with imaging, is marginal.5 Without antibiotics, rhinosinusitis resolved in 46% of patients after one week and in 64% of patients after 14 days. 5 Antibiotics can shorten time to resolution but in only five to 11 more people per 100 compared with placebo or no treatment. Discussed ED precautions including (but not limited to)  • Difficultly breathing or shortness of breath  • Chest pain  • Acutely worsening symptoms. Risks and benefits discussed. Patient understands and agrees with the plan. Follow up with PCP.      Chief Complaint     Chief Complaint   Patient presents with   • Cold Like Symptoms     Started 6 days ago, patient complains of fever, chills, clogged ears, congestion, cough, and post nasal drip. History of Present Illness       Sinusitis  This is a new problem. The current episode started in the past 7 days. The problem is unchanged. There has been no fever (has not checked). Associated symptoms include chills and congestion. Review of Systems   Review of Systems   Constitutional: Positive for chills. HENT: Positive for congestion.           Current Medications       Current Outpatient Medications:   •  albuterol (PROVENTIL HFA,VENTOLIN HFA) 90 mcg/act inhaler, Inhale 1 puff every 6 (six) hours as needed for wheezing, Disp: 18 g, Rfl: 3  •  ammonium lactate (LAC-HYDRIN) 12 % lotion, Apply topically 2 (two) times a day as needed for dry skin, Disp: 400 g, Rfl: 0  •  aspirin 81 MG tablet, Take 1 tablet by mouth daily, Disp: , Rfl:   •  atorvastatin (LIPITOR) 20 mg tablet, TAKE 1 TABLET BY MOUTH EVERY DAY, Disp: 90 tablet, Rfl: 2  •  benzonatate (TESSALON PERLES) 100 mg capsule, Take 1 capsule (100 mg total) by mouth 3 (three) times a day as needed for cough, Disp: 20 capsule, Rfl: 0  •  cetirizine (ZyrTEC) 10 mg tablet, Take 1 tablet (10 mg total) by mouth daily for 10 days, Disp: 10 tablet, Rfl: 0  •  DULoxetine (CYMBALTA) 60 mg delayed release capsule, TAKE 1 CAPSULE BY MOUTH EVERY DAY, Disp: 90 capsule, Rfl: 4  •  fluticasone (FLONASE) 50 mcg/act nasal spray, 1 spray into each nostril 2 (two) times a day, Disp: 11.1 mL, Rfl: 0  •  gabapentin (NEURONTIN) 300 mg capsule, Take 2 capsules (600 mg total) by mouth 2 (two) times a day, Disp: 360 capsule, Rfl: 2  •  hydrochlorothiazide (HYDRODIURIL) 25 mg tablet, Take 1 tablet (25 mg total) by mouth daily, Disp: 90 tablet, Rfl: 2  •  hydrocortisone 1 % cream, Apply topically 4 (four) times a day as needed for rash, Disp: 30 g, Rfl: 0  •  levothyroxine 175 mcg tablet, TAKE 1 TABLET BY MOUTH EVERY DAY, Disp: 90 tablet, Rfl: 1  •  lisinopril (ZESTRIL) 10 mg tablet, TAKE 1 TABLET BY MOUTH EVERY DAY, Disp: 90 tablet, Rfl: 1  •  pantoprazole (PROTONIX) 40 mg tablet, TAKE 1 TABLET BY MOUTH EVERY DAY, Disp: 90 tablet, Rfl: 1  •  pseudoephedrine (SUDAFED) 120 MG 12 hr tablet, Take 1 tablet (120 mg total) by mouth 2 (two) times a day, Disp: 10 tablet, Rfl: 0  •  pseudoephedrine (SUDAFED) 30 mg tablet, Take 60 mg by mouth every 4 (four) hours as needed for congestion, Disp: , Rfl:   •  Semaglutide-Weight Management (Wegovy) 0.25 MG/0.5ML, INJECT 0.5 ML (0.25 MG TOTAL) UNDER THE SKIN ONCE A WEEK, Disp: , Rfl:   •  SUBOXONE 8-2 MG, USE 2 & 1/2 FILMS UNDER TONGUE DAILY, Disp: , Rfl: 0    Current Allergies     Allergies as of 10/01/2023 - Reviewed 10/01/2023   Allergen Reaction Noted   • Penicillins Itching 2017            The following portions of the patient's history were reviewed and updated as appropriate: allergies, current medications, past family history, past medical history, past social history, past surgical history and problem list.     Past Medical History:   Diagnosis Date   • Abnormal ECG    • Abnormal Pap smear of cervix    • Denial    • Heart aneurysm    • Heart murmur    • Hypertension    • Hypothyroidism    • Infectious viral hepatitis     hep c   • Obesity (BMI 30-39.9) 2023   • Overweight 2019   • PAD (peripheral artery disease) (HCC)        Past Surgical History:   Procedure Laterality Date   • AUGMENTATION MAMMAPLASTY     •  SECTION     • COLONOSCOPY  2021   • ENDOSCOPY OF [facility]  2021       Family History   Problem Relation Age of Onset   • Other Mother         Denial   • Vaginal cancer Mother 66   • Coronary artery disease Father    • Other Father         Denial   • Celiac disease Son    • Celiac disease Son    • Breast cancer Maternal Aunt          Medications have been verified.         Objective   /78   Pulse 66   Temp 97.7 °F (36.5 °C)   Resp 18   Ht 5' 5" (1.651 m)   Wt 78.9 kg (174 lb)   SpO2 97%   BMI 28.96 kg/m²   No LMP recorded. Patient is postmenopausal.       Physical Exam     Physical Exam  Constitutional:       General: She is not in acute distress. Appearance: She is not ill-appearing or toxic-appearing. HENT:      Head: Normocephalic and atraumatic. Right Ear: Tympanic membrane and external ear normal.      Left Ear: Tympanic membrane and external ear normal.      Nose: Congestion present. Comments: Boggy nasal turbinates     Mouth/Throat:      Pharynx: Posterior oropharyngeal erythema present. Comments: Cobblestone appearance in posterior pharynx  Cardiovascular:      Rate and Rhythm: Normal rate and regular rhythm. Pulmonary:      Effort: Pulmonary effort is normal. No respiratory distress. Breath sounds: Rhonchi (cleared with cough) present. No wheezing. Neurological:      General: No focal deficit present. Mental Status: She is alert and oriented to person, place, and time. Psychiatric:         Mood and Affect: Mood normal.         Behavior: Behavior normal.         Thought Content:  Thought content normal.         Judgment: Judgment normal.               Vinayak Junior DO

## 2023-10-12 ENCOUNTER — TELEPHONE (OUTPATIENT)
Age: 63
End: 2023-10-12

## 2023-10-12 DIAGNOSIS — J20.9 ACUTE BRONCHITIS, UNSPECIFIED ORGANISM: Primary | ICD-10-CM

## 2023-10-12 NOTE — TELEPHONE ENCOUNTER
Patient just called said she has been sick for 17 days with a cold, cough & sinus issues she went to urgent care on 10/1 and we have no open appts for days.

## 2023-10-13 DIAGNOSIS — I10 ESSENTIAL HYPERTENSION: ICD-10-CM

## 2023-10-13 RX ORDER — LISINOPRIL 10 MG/1
TABLET ORAL
Qty: 90 TABLET | Refills: 1 | Status: SHIPPED | OUTPATIENT
Start: 2023-10-13

## 2023-10-19 DIAGNOSIS — I10 ESSENTIAL HYPERTENSION: ICD-10-CM

## 2023-10-19 RX ORDER — HYDROCHLOROTHIAZIDE 25 MG/1
25 TABLET ORAL DAILY
Qty: 90 TABLET | Refills: 2 | Status: SHIPPED | OUTPATIENT
Start: 2023-10-19

## 2023-10-20 ENCOUNTER — VBI (OUTPATIENT)
Dept: ADMINISTRATIVE | Facility: OTHER | Age: 63
End: 2023-10-20

## 2023-10-27 ENCOUNTER — OFFICE VISIT (OUTPATIENT)
Age: 63
End: 2023-10-27
Payer: COMMERCIAL

## 2023-10-27 VITALS
RESPIRATION RATE: 18 BRPM | WEIGHT: 176.6 LBS | TEMPERATURE: 97.7 F | OXYGEN SATURATION: 97 % | DIASTOLIC BLOOD PRESSURE: 60 MMHG | HEART RATE: 82 BPM | SYSTOLIC BLOOD PRESSURE: 112 MMHG | BODY MASS INDEX: 29.39 KG/M2

## 2023-10-27 DIAGNOSIS — E03.9 ACQUIRED HYPOTHYROIDISM: ICD-10-CM

## 2023-10-27 DIAGNOSIS — G89.29 CHRONIC BILATERAL LOW BACK PAIN WITHOUT SCIATICA: ICD-10-CM

## 2023-10-27 DIAGNOSIS — R06.02 SOB (SHORTNESS OF BREATH): ICD-10-CM

## 2023-10-27 DIAGNOSIS — M54.50 CHRONIC BILATERAL LOW BACK PAIN WITHOUT SCIATICA: ICD-10-CM

## 2023-10-27 DIAGNOSIS — R73.9 HYPERGLYCEMIA: ICD-10-CM

## 2023-10-27 DIAGNOSIS — M79.675 PAIN OF LEFT GREAT TOE: ICD-10-CM

## 2023-10-27 DIAGNOSIS — I10 ESSENTIAL HYPERTENSION: Primary | ICD-10-CM

## 2023-10-27 DIAGNOSIS — J20.9 ACUTE BRONCHITIS, UNSPECIFIED ORGANISM: ICD-10-CM

## 2023-10-27 DIAGNOSIS — F33.41 RECURRENT MAJOR DEPRESSIVE DISORDER, IN PARTIAL REMISSION (HCC): ICD-10-CM

## 2023-10-27 DIAGNOSIS — E78.2 MIXED HYPERLIPIDEMIA: ICD-10-CM

## 2023-10-27 PROCEDURE — 99214 OFFICE O/P EST MOD 30 MIN: CPT | Performed by: INTERNAL MEDICINE

## 2023-10-27 RX ORDER — ALBUTEROL SULFATE 90 UG/1
1 AEROSOL, METERED RESPIRATORY (INHALATION) EVERY 6 HOURS PRN
Qty: 18 G | Refills: 3 | Status: SHIPPED | OUTPATIENT
Start: 2023-10-27

## 2023-10-27 RX ORDER — AZITHROMYCIN 250 MG/1
TABLET, FILM COATED ORAL
Qty: 6 TABLET | Refills: 0 | Status: SHIPPED | OUTPATIENT
Start: 2023-10-27 | End: 2023-11-01

## 2023-10-27 RX ORDER — PREDNISONE 10 MG/1
TABLET ORAL
Qty: 30 TABLET | Refills: 0 | Status: SHIPPED | OUTPATIENT
Start: 2023-10-27

## 2023-10-27 NOTE — PROGRESS NOTES
INTERNAL MEDICINE OFFICE VISIT  [facility] AT [facility], [place], [address] [facility]  Tel: [phone]      NAME: Laura Sherman  AGE: 61 y.o. SEX: female  : 1960   MRN: [id number]    DATE: 10/27/2023  TIME: 1:59 PM      Assessment and Plan:  1. Essential hypertension  Blood pressure stable, continue lisinopril at the same dose    2. Mixed hyperlipidemia  Continue atorvastatin  - CBC and differential; Future  - Comprehensive metabolic panel; Future  - Lipid panel; Future  - TSH, 3rd generation; Future    3. Hyperglycemia  Was advised a low carbohydrate diet  - Hemoglobin A1C; Future    4. Acquired hypothyroidism  Check a TSH level, will call her with the results    5. Chronic bilateral low back pain without sciatica  Continue Cymbalta and gabapentin    6. Recurrent major depressive disorder, in partial remission (HCC)  Continue Cymbalta    7. Pain of left great toe    - Ambulatory Referral to Podiatry; Future    8. SOB (shortness of breath)    - albuterol (PROVENTIL HFA,VENTOLIN HFA) 90 mcg/act inhaler; Inhale 1 puff every 6 (six) hours as needed for wheezing  Dispense: 18 g; Refill: 3    9. Acute bronchitis, unspecified organism  Was advised to take albuterol inhaler 3-4 times a day as needed  - azithromycin (Zithromax) 250 mg tablet; Take 2 tablets (500 mg total) by mouth daily for 1 day, THEN 1 tablet (250 mg total) daily for 4 days. Dispense: 6 tablet; Refill: 0  - predniSONE 10 mg tablet; Take 40 mg daily for 3 days, 30 mg daily for 3 days, 20 mg daily for 3 days, 10 mg daily for 3 days  Dispense: 30 tablet; Refill: 0      - Counseling Documentation: patient was counseled regarding: diagnostic results, instructions for management, risk factor reductions, prognosis, patient and family education, risks and benefits of treatment options, and importance of compliance with treatment  - Medication Side Effects:  Adverse side effects of medications were reviewed with the patient/guardian today. Return for follow up visit in 3 months or earlier, if needed. Chief Complaint:  Chief Complaint   Patient presents with    Follow-up         History of Present Illness:   Patient has been sick with symptoms of cough, shortness of breath, wheezing and congestion for the last 2 months. She has been seen by urgent care and by one of my colleagues in the office but she does not think that her symptoms are improving. She was told to get a chest x-ray done but she did not. Also she continues to smoke and has not been taking her inhaler regularly.   Her blood pressure is stable on medication  She has not had blood work done recently for the cholesterol or A1c as well as the TSH  Depression is stable  She is complaining of pain in her left great toe and was told to see podiatry      Active Problem List:  Patient Active Problem List   Diagnosis    Acquired hypothyroidism    Essential hypertension    Recurrent major depressive disorder, in partial remission (HCC)    Mixed hyperlipidemia    Gastroesophageal reflux disease without esophagitis    Opioid dependence on agonist therapy (HCC)    Hyperglycemia    Overweight    Cigarette nicotine dependence without complication    Chronic pain of right knee    Chronic bilateral low back pain without sciatica    Peripheral vascular disease (HCC)    SOB (shortness of breath)    Esophageal dysphagia    History of colon polyps    DDD (degenerative disc disease), cervical    Chronic hepatitis C without hepatic coma (HCC)    Impingement syndrome of right shoulder    Numbness of face    Pancreatic cyst    Pain of left great toe         Past Medical History:  Past Medical History:   Diagnosis Date    Abnormal ECG     Abnormal Pap smear of cervix     Denial     Heart aneurysm     Heart murmur     Hypertension     Hypothyroidism     Infectious viral hepatitis     hep c    Obesity (BMI 30-39.9) 1/26/2023    Overweight 7/11/2019    PAD (peripheral artery disease) (HCC)          Past Surgical History:  Past Surgical History:   Procedure Laterality Date    AUGMENTATION MAMMAPLASTY       SECTION      COLONOSCOPY  2021    ENDOSCOPY OF POUCH  2021         Family History:  Family History   Problem Relation Age of Onset    Other Mother         Denial    Vaginal cancer Mother 66    Coronary artery disease Father     Other Father         Denial    Heart disease Brother     Celiac disease Son     Celiac disease Son     Breast cancer Maternal Aunt          Social History:  Social History     Socioeconomic History    Marital status:      Spouse name: None    Number of children: None    Years of education: None    Highest education level: None   Occupational History    None   Tobacco Use    Smoking status: Every Day     Packs/day: 1.00     Years: 20.00     Total pack years: 20.00     Types: Cigarettes    Smokeless tobacco: Never   Vaping Use    Vaping Use: Every day    Substances: Nicotine   Substance and Sexual Activity    Alcohol use: Yes     Alcohol/week: 2.0 standard drinks of alcohol     Types: 1 Glasses of wine, 1 Cans of beer per week    Drug use: No    Sexual activity: Not Currently     Partners: Male   Other Topics Concern    None   Social History Narrative    None     Social Determinants of Health     Financial Resource Strain: Not on file   Food Insecurity: Not on file   Transportation Needs: Not on file   Physical Activity: Inactive (10/4/2021)    Exercise Vital Sign     Days of Exercise per Week: 0 days     Minutes of Exercise per Session: 0 min   Stress: Stress Concern Present (10/4/2021)    [facility]     Feeling of Stress : Rather much   Social Connections: Not on file   Intimate Partner Violence: Not on file   Housing Stability: Not on file         Allergies:   Allergies   Allergen Reactions    Penicillins Itching         Medications:    Current Outpatient Medications:     albuterol (PROVENTIL HFA,VENTOLIN HFA) 90 mcg/act inhaler, Inhale 1 puff every 6 (six) hours as needed for wheezing, Disp: 18 g, Rfl: 3    ammonium lactate (LAC-HYDRIN) 12 % lotion, Apply topically 2 (two) times a day as needed for dry skin, Disp: 400 g, Rfl: 0    aspirin 81 MG tablet, Take 1 tablet by mouth daily, Disp: , Rfl:     atorvastatin (LIPITOR) 20 mg tablet, TAKE 1 TABLET BY MOUTH EVERY DAY, Disp: 90 tablet, Rfl: 2    azithromycin (Zithromax) 250 mg tablet, Take 2 tablets (500 mg total) by mouth daily for 1 day, THEN 1 tablet (250 mg total) daily for 4 days. , Disp: 6 tablet, Rfl: 0    benzonatate (TESSALON PERLES) 100 mg capsule, Take 1 capsule (100 mg total) by mouth 3 (three) times a day as needed for cough, Disp: 20 capsule, Rfl: 0    DULoxetine (CYMBALTA) 60 mg delayed release capsule, TAKE 1 CAPSULE BY MOUTH EVERY DAY, Disp: 90 capsule, Rfl: 4    fluticasone (FLONASE) 50 mcg/act nasal spray, 1 spray into each nostril 2 (two) times a day, Disp: 11.1 mL, Rfl: 0    gabapentin (NEURONTIN) 300 mg capsule, Take 2 capsules (600 mg total) by mouth 2 (two) times a day, Disp: 360 capsule, Rfl: 2    hydrochlorothiazide (HYDRODIURIL) 25 mg tablet, TAKE 1 TABLET (25 MG TOTAL) BY MOUTH DAILY. , Disp: 90 tablet, Rfl: 2    hydrocortisone 1 % cream, Apply topically 4 (four) times a day as needed for rash, Disp: 30 g, Rfl: 0    levothyroxine 175 mcg tablet, TAKE 1 TABLET BY MOUTH EVERY DAY, Disp: 90 tablet, Rfl: 1    lisinopril (ZESTRIL) 10 mg tablet, TAKE 1 TABLET BY MOUTH EVERY DAY, Disp: 90 tablet, Rfl: 1    pantoprazole (PROTONIX) 40 mg tablet, TAKE 1 TABLET BY MOUTH EVERY DAY, Disp: 90 tablet, Rfl: 1    predniSONE 10 mg tablet, Take 40 mg daily for 3 days, 30 mg daily for 3 days, 20 mg daily for 3 days, 10 mg daily for 3 days, Disp: 30 tablet, Rfl: 0    pseudoephedrine (SUDAFED) 120 MG 12 hr tablet, Take 1 tablet (120 mg total) by mouth 2 (two) times a day, Disp: 10 tablet, Rfl: 0 pseudoephedrine (SUDAFED) 30 mg tablet, Take 60 mg by mouth every 4 (four) hours as needed for congestion, Disp: , Rfl:     Semaglutide-Weight Management (Wegovy) 0.25 MG/0.5ML, INJECT 0.5 ML (0.25 MG TOTAL) UNDER THE SKIN ONCE A WEEK, Disp: , Rfl:     SUBOXONE 8-2 MG, USE 2 & 1/2 FILMS UNDER TONGUE DAILY, Disp: , Rfl: 0    cetirizine (ZyrTEC) 10 mg tablet, Take 1 tablet (10 mg total) by mouth daily for 10 days, Disp: 10 tablet, Rfl: 0      The following portions of the patient's history were reviewed and updated as appropriate: past medical history, past surgical history, family history, social history, allergies, current medications and active problem list.      Review of Systems:  Constitutional: Denies fever, chills, weight gain, weight loss, fatigue  Eyes: Denies eye redness, eye discharge, double vision, change in visual acuity  ENT: Denies hearing loss, tinnitus, sneezing, has nasal congestion, nasal discharge, sore throat   Respiratory: Complains of cough, expectoration,shortness of breath, wheezing  Cardiovascular: Denies chest pain, palpitations, lower extremity swelling, orthopnea, PND  Gastrointestinal: Denies abdominal pain, heartburn, nausea, vomiting, hematemesis, diarrhea, bloody stools  Genito-Urinary: Denies dysuria, frequency, difficulty in micturition, nocturia, incontinence  Musculoskeletal: Denies back pain, has pain in the left great toe  Neurologic: Denies confusion, lightheadedness, syncope, headache, focal weakness, sensory changes, seizures  Endocrine: Denies polyuria, polydipsia, temperature intolerance  Allergy and Immunology: Denies hives, insect bite sensitivity  Hematological and Lymphatic: Denies bleeding problems, swollen glands   Psychological: Denies depression, suicidal ideation, anxiety, panic, mood swings  Dermatological: Denies pruritus, rash, skin lesion changes      Vitals:  Vitals:    10/27/23 1336   BP: 112/60   Pulse: 82   Resp: 18   Temp: 97.7 °F (36.5 °C)   SpO2: 97% Body mass index is 29.39 kg/m². Weight (last 2 days)       Date/Time Weight    10/27/23 1336 80.1 (176.6)              Physical Examination:  General: Patient is not in acute distress. Awake, alert, responding to commands. No weight gain or loss  Head: Normocephalic. Atraumatic  Eyes: Conjunctiva and lids with no swelling, erythema or discharge. Both pupils normal sized, round and reactive to light. Sclera nonicteric  ENT: External examination of nose and ear normal. Otoscopic examination shows translucent tympanic membranes with patent canals without erythema. Oropharynx moist with no erythema, edema, exudate or lesions  Neck: Supple. JVP not raised. Trachea midline. No masses. No thyromegaly  Lungs: No signs of increased work of breathing or respiratory distress. Bilateral rhonchi  Chest wall: No tenderness  Cardiovascular: Normal PMI. No thrills. Regular rate and rhythm. S1 and S2 normal. No murmur, rub or gallop  Gastrointestinal: Abdomen soft, nontender. No guarding or rigidity. Liver and spleen not palpable. Bowel sounds present  Neurologic: Cranial nerves II-XII intact.  Cortical functions normal. Motor system - Reflexes 2+ and symmetrical. Sensations normal  Musculoskeletal: Gait normal.  Left great toe tenderness  Integumentary: Skin normal with no rash or lesions  Lymphatic: No palpable lymph nodes in neck, axilla or groin  Extremities: No clubbing, cyanosis, edema or varicosities  Psychological: Judgement and insight normal. Mood and affect normal      Laboratory Results:  CBC with diff:   Lab Results   Component Value Date    WBC 7.84 07/18/2023    RBC 5.00 07/18/2023    HGB 15.7 (H) 07/18/2023    HCT 46.1 07/18/2023    MCV 92 07/18/2023    MCH 31.4 07/18/2023    RDW 12.5 07/18/2023     07/18/2023       CMP:  Lab Results   Component Value Date    CREATININE 0.63 07/18/2023    BUN 13 07/18/2023    K 3.6 07/18/2023     07/18/2023    CO2 31 07/18/2023    ALKPHOS 56 07/18/2023    ALT 17 07/18/2023    AST 22 07/18/2023       Lab Results   Component Value Date    HGBA1C 5.7 (H) 07/18/2023       No results found for: "TROPONINI", "CKMB", "CKTOTAL"    Lipid Profile:   No results found for: "CHOL"  Lab Results   Component Value Date    HDL 46 (L) 07/18/2023    HDL 46 (L) 05/26/2023     Lab Results   Component Value Date    LDLCALC 50 07/18/2023    [address] 05/26/2023     Lab Results   Component Value Date    TRIG 104 07/18/2023    TRIG 104 05/26/2023       Imaging Results:  MRI abdomen w wo contrast and mrcp  Narrative: MRI OF THE ABDOMEN WITH AND WITHOUT CONTRAST WITH MRCP    INDICATION: 61 years / Female  K86.2: Cyst of pancreas  K83.8: Other specified diseases of biliary tract. COMPARISON: Ultrasound abdomen 3/1/2023    TECHNIQUE:  Multiplanar/multisequence MRI of the abdomen with 3D MRCP was performed before and after administration of contrast. Imaging performed on 1.5T MRI    IV Contrast:  7 mL of Gadobutrol injection (SINGLE-DOSE)    FINDINGS:    LOWER CHEST:   Bilateral breast implants. LIVER:  Mild hepatic steatosis. No suspicious mass. Focal fatty sparing is seen adjacent to the gallbladder fossa (11/115, 12/60, 13/56). The hepatic veins and portal veins are patent. BILE DUCTS: There is borderline central intrahepatic biliary ductal dilation measuring up to 5 mm. Proximal extrahepatic biliary duct is mildly dilated measuring up to 0.9 cm, with smooth distal tapering. The distal pancreatic duct is normal in caliber,   measuring 0.5 cm. No choledocholithiasis. GALLBLADDER:  Normal.    PANCREAS: Pancreas divisum. No main pancreatic ductal dilation. No suspicious focal cystic lesion is seen in the pancreas. ADRENAL GLANDS:  Unremarkable. SPLEEN:  Normal.    KIDNEYS/PROXIMAL URETERS:  No hydroureteronephrosis. No suspicious renal mass. BOWEL:   No dilated loops of bowel. PERITONEUM/RETROPERITONEUM:  No ascites. LYMPH NODES:  No abdominal lymphadenopathy.     VASCULAR STRUCTURES:  No aneurysm. ABDOMINAL WALL:  Unremarkable. OSSEOUS STRUCTURES:  No suspicious osseous lesion. Impression: Pancreas divisum. No main pancreatic ductal dilation. No suspicious pancreatic cystic lesions. Mild dilation of central intrahepatic bile ducts and proximal extrahepatic bile duct with smooth distal tapering to normal caliber. No choledocholithiasis. No suspicious focal pancreatic head lesion is seen. Findings can be secondary to benign or   malignant distal CBD stricture/periampullary mass. GI consultation is recommended for further management. The study was marked in EPIC for significant notification.     Workstation performed: [id number]       Health Maintenance:  Health Maintenance   Topic Date Due    Pneumococcal Vaccine: Pediatrics (0 to 5 Years) and At-Risk Patients (6 to 59 Years) (1 - PCV) 01/14/1966    Hepatitis A Vaccine (1 of 2 - Risk 2-dose series) Never done    DTaP,Tdap,and Td Vaccines (1 - Tdap) 01/14/1981    Hepatitis B Vaccine (1 of 3 - Risk 3-dose series) Never done    COVID-19 Vaccine (3 - Pfizer series) 07/29/2021    Breast Cancer Screening: Mammogram  08/24/2022    Influenza Vaccine (1) 09/01/2023    Annual Physical  01/24/2024    BMI: Followup Plan  01/26/2024    Lung Cancer Screening  03/01/2024    Depression Remission PHQ  04/27/2024    BMI: Adult  10/27/2024    Cervical Cancer Screening  01/24/2026    Colorectal Cancer Screening  09/26/2026    HIV Screening  Completed    HIB Vaccine  Aged Out    IPV Vaccine  Aged Out    Meningococcal ACWY Vaccine  Aged Out    HPV Vaccine  Aged Out    Hepatitis C Screening  Discontinued     Immunization History   Administered Date(s) Administered    COVID-19 PFIZER VACCINE 0.3 ML IM 05/12/2021, 06/03/2021    INFLUENZA 11/16/2018    Influenza, recombinant, quadrivalent,injectable, preservative free 11/16/2018, 09/23/2020, 10/04/2021, 12/13/2022    Influenza, seasonal, injectable 1960    Pneumococcal Polysaccharide PPV23 1960    Tdap 1960    Zoster 1960         Milind Llanes MD  10/27/2023,1:59 PM

## 2023-11-05 ENCOUNTER — TELEPHONE (OUTPATIENT)
Dept: OTHER | Facility: OTHER | Age: 63
End: 2023-11-05

## 2023-11-05 NOTE — TELEPHONE ENCOUNTER
Patient is calling regarding cancelling an appointment.     Date/Time:11/05/2023    Patient was rescheduled: YES [] NO [x]    Patient requesting call back to reschedule: YES [] NO [x]

## 2023-11-09 ENCOUNTER — TELEPHONE (OUTPATIENT)
Age: 63
End: 2023-11-09

## 2023-11-09 NOTE — TELEPHONE ENCOUNTER
Saw Dr Emery Conway recently for bronchitis. Finished Z-marifer. She would like another anti-biotic to get rid of the cough deep in her chest.     She's feeling better and has more energy- cough only remains.     When she's fully recovered she'll touch base with Dr Emery Conway and come in for an appt

## 2023-11-14 DIAGNOSIS — K21.9 GASTROESOPHAGEAL REFLUX DISEASE WITHOUT ESOPHAGITIS: ICD-10-CM

## 2023-11-14 DIAGNOSIS — R13.19 ESOPHAGEAL DYSPHAGIA: ICD-10-CM

## 2023-11-14 RX ORDER — PANTOPRAZOLE SODIUM 40 MG/1
TABLET, DELAYED RELEASE ORAL
Qty: 90 TABLET | Refills: 1 | Status: SHIPPED | OUTPATIENT
Start: 2023-11-14

## 2023-11-20 ENCOUNTER — TELEPHONE (OUTPATIENT)
Age: 63
End: 2023-11-20

## 2023-11-20 DIAGNOSIS — E87.6 HYPOKALEMIA: Primary | ICD-10-CM

## 2023-11-20 RX ORDER — POTASSIUM CHLORIDE 20 MEQ/1
20 TABLET, EXTENDED RELEASE ORAL DAILY
Qty: 30 TABLET | Refills: 5 | Status: SHIPPED | OUTPATIENT
Start: 2023-11-20

## 2023-11-20 NOTE — TELEPHONE ENCOUNTER
Pharmacy calling stating patient called them for Klor con- 21 M tab.    Looks like it was D/c;d 04/2023     Please advise

## 2023-11-28 ENCOUNTER — TELEPHONE (OUTPATIENT)
Age: 63
End: 2023-11-28

## 2023-11-28 DIAGNOSIS — M25.551 BILATERAL HIP PAIN: ICD-10-CM

## 2023-11-28 DIAGNOSIS — M25.552 BILATERAL HIP PAIN: ICD-10-CM

## 2023-11-28 DIAGNOSIS — M54.50 CHRONIC BILATERAL LOW BACK PAIN WITHOUT SCIATICA: Primary | ICD-10-CM

## 2023-11-28 DIAGNOSIS — G89.29 CHRONIC BILATERAL LOW BACK PAIN WITHOUT SCIATICA: Primary | ICD-10-CM

## 2023-11-28 NOTE — TELEPHONE ENCOUNTER
Patient hurt herself at work two weeks ago with her hip & back wanted to know if you would put in a referral for orth please call her at [phone]

## 2023-11-30 ENCOUNTER — APPOINTMENT (EMERGENCY)
Dept: MRI IMAGING | Facility: HOSPITAL | Age: 63
End: 2023-11-30
Payer: COMMERCIAL

## 2023-11-30 ENCOUNTER — HOSPITAL ENCOUNTER (EMERGENCY)
Facility: HOSPITAL | Age: 63
Discharge: HOME | End: 2023-11-30
Attending: EMERGENCY MEDICINE
Payer: COMMERCIAL

## 2023-11-30 ENCOUNTER — APPOINTMENT (OUTPATIENT)
Dept: RADIOLOGY | Facility: CLINIC | Age: 63
End: 2023-11-30
Payer: COMMERCIAL

## 2023-11-30 ENCOUNTER — OFFICE VISIT (OUTPATIENT)
Dept: OBGYN CLINIC | Facility: CLINIC | Age: 63
End: 2023-11-30
Payer: COMMERCIAL

## 2023-11-30 VITALS
BODY MASS INDEX: 30.82 KG/M2 | HEART RATE: 76 BPM | HEIGHT: 65 IN | OXYGEN SATURATION: 94 % | DIASTOLIC BLOOD PRESSURE: 74 MMHG | RESPIRATION RATE: 18 BRPM | WEIGHT: 185 LBS | SYSTOLIC BLOOD PRESSURE: 123 MMHG

## 2023-11-30 VITALS
RESPIRATION RATE: 18 BRPM | OXYGEN SATURATION: 95 % | SYSTOLIC BLOOD PRESSURE: 121 MMHG | TEMPERATURE: 98.5 F | DIASTOLIC BLOOD PRESSURE: 69 MMHG | HEART RATE: 53 BPM

## 2023-11-30 DIAGNOSIS — M51.36 DEGENERATIVE DISC DISEASE, LUMBAR: Primary | ICD-10-CM

## 2023-11-30 DIAGNOSIS — M54.50 ACUTE LOW BACK PAIN, UNSPECIFIED BACK PAIN LATERALITY, UNSPECIFIED WHETHER SCIATICA PRESENT: ICD-10-CM

## 2023-11-30 DIAGNOSIS — M54.9 BACK PAIN: Primary | ICD-10-CM

## 2023-11-30 DIAGNOSIS — M25.552 BILATERAL HIP PAIN: ICD-10-CM

## 2023-11-30 DIAGNOSIS — G95.9 LUMBAR MYELOPATHY (HCC): ICD-10-CM

## 2023-11-30 DIAGNOSIS — M25.551 BILATERAL HIP PAIN: ICD-10-CM

## 2023-11-30 PROCEDURE — 99284 EMERGENCY DEPT VISIT MOD MDM: CPT

## 2023-11-30 PROCEDURE — 72110 X-RAY EXAM L-2 SPINE 4/>VWS: CPT

## 2023-11-30 PROCEDURE — 72148 MRI LUMBAR SPINE W/O DYE: CPT

## 2023-11-30 PROCEDURE — 99214 OFFICE O/P EST MOD 30 MIN: CPT | Performed by: FAMILY MEDICINE

## 2023-11-30 PROCEDURE — 99284 EMERGENCY DEPT VISIT MOD MDM: CPT | Performed by: EMERGENCY MEDICINE

## 2023-11-30 NOTE — PROGRESS NOTES
Assessment/Plan:    No problem-specific Assessment & Plan notes found for this encounter. Diagnoses and all orders for this visit:    Degenerative disc disease, lumbar  -     XR spine lumbar minimum 4 views non injury; Future    Lumbar myelopathy ([address])  -     Ambulatory Referral to Orthopedic Surgery    Bilateral hip pain  -     Ambulatory Referral to Orthopedic Surgery     Radiographs were obtained of the lumbar spine, no acute fractures or dislocations were noted however advanced degenerative changes were seen in the L5-S1 level. Patient is presenting with acute on chronic lower back pain symptoms following injury approximately 2 weeks ago with new onset left lower extremity weakness numbness and bladder incontinence. Given her constellation of symptoms and examination demonstrating myotomal weakness in the left lower extremity I am recommending urgent evaluation at the emergency room to rule out potential cauda equina syndrome. Patient was recommended to proceed to the emergency room for further evaluation. Emergency room was notified of patient's transfer    Subjective:      Patient ID: Gab Barnard is a 61 y.o. female presenting today for initial evaluation consultation for lower back pain with lower extremity paresthesia. 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 She is unclear of the mechanism of injury but believes she may have bent forward or twisted the spine resulting in increased pain. She does have a history of lower back pain symptoms however states over the past 2 weeks the pain has been a 10 out of 10 in severity and has been unrelieved with gabapentin, Cymbalta, oral anti-inflammatory, and muscle relaxer. She additionally reports that she has been experiencing symptoms of urinary incontinence that is new over the past 2 weeks and feels notable weakness in the left lower extremity.   Prior to this injury she reports normal urinary control. She states she now has to wear a adult diaper to control urinary symptoms. Her chart review does not reveal any history of urinary incontinence    HPI    The following portions of the patient's history were reviewed and updated as appropriate: allergies, current medications, past family history, past medical history, past social history, past surgical history, and problem list.    Review of Systems      Objective:      /74   Pulse 76   Resp 18   Ht 5' 5" (1.651 m)   Wt 83.9 kg (185 lb)   SpO2 94%   BMI 30.79 kg/m²          Physical Exam  Constitutional:       General: She is in acute distress. Appearance: She is not toxic-appearing. Eyes:      Extraocular Movements: Extraocular movements intact. Musculoskeletal:      Lumbar back: Spasms and tenderness present. No bony tenderness. Decreased range of motion. Comments: She is standing with hunched gait due to pain  There is notable weakness in the left lower extremity with hip flexion and knee extension  There is notable sensory deficits in the L2-L4 dermatomal distribution  Left patellar reflex +1, right patellar reflex +2  She is unable to perform heel and toe walk   Neurological:      General: No focal deficit present. Mental Status: She is oriented to person, place, and time.

## 2023-12-01 NOTE — ED PROVIDER NOTES
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 PMH significant for chronic back pain, worse in the last 2 weeks. Pt notes that for the last few days she has had episodic urinary incontinence while standing up and she was noted to have LLE weakness today. She denies fever and h/o ivda. Additional history from review of outpt office visit note with orthopedics today. Prior to Admission Medications   Prescriptions Last Dose Informant Patient Reported? Taking?    DULoxetine (CYMBALTA) 60 mg delayed release capsule  Self No No   Sig: TAKE 1 CAPSULE BY MOUTH EVERY DAY   SUBOXONE 8-2 MG  Self Yes No   Sig: USE 2 & 1/2 FILMS UNDER TONGUE DAILY   Semaglutide-Weight Management (Wegovy) 0.25 MG/0.5ML  Self Yes No   Sig: INJECT 0.5 ML (0.25 MG TOTAL) UNDER THE SKIN ONCE A WEEK   Patient not taking: Reported on 11/30/2023   albuterol (PROVENTIL HFA,VENTOLIN HFA) 90 mcg/act inhaler  Self No No   Sig: Inhale 1 puff every 6 (six) hours as needed for wheezing   ammonium lactate (LAC-HYDRIN) 12 % lotion  Self No No   Sig: Apply topically 2 (two) times a day as needed for dry skin   aspirin 81 MG tablet  Self Yes No   Sig: Take 1 tablet by mouth daily   atorvastatin (LIPITOR) 20 mg tablet  Self No No   Sig: TAKE 1 TABLET BY MOUTH EVERY DAY   benzonatate (TESSALON PERLES) 100 mg capsule  Self No No   Sig: Take 1 capsule (100 mg total) by mouth 3 (three) times a day as needed for cough   cetirizine (ZyrTEC) 10 mg tablet   No No   Sig: Take 1 tablet (10 mg total) by mouth daily for 10 days   Patient not taking: Reported on 11/30/2023 fluticasone (FLONASE) 50 mcg/act nasal spray  Self No No   Si spray into each nostril 2 (two) times a day   gabapentin (NEURONTIN) 300 mg capsule  Self No No   Sig: Take 2 capsules (600 mg total) by mouth 2 (two) times a day   hydrochlorothiazide (HYDRODIURIL) 25 mg tablet  Self No No   Sig: TAKE 1 TABLET (25 MG TOTAL) BY MOUTH DAILY.    hydrocortisone 1 % cream  Self No No   Sig: Apply topically 4 (four) times a day as needed for rash   levothyroxine 175 mcg tablet  Self No No   Sig: TAKE 1 TABLET BY MOUTH EVERY DAY   lisinopril (ZESTRIL) 10 mg tablet  Self No No   Sig: TAKE 1 TABLET BY MOUTH EVERY DAY   pantoprazole (PROTONIX) 40 mg tablet  Self No No   Sig: TAKE 1 TABLET BY MOUTH EVERY DAY   potassium chloride (K-DUR,KLOR-CON) 20 mEq tablet  Self No No   Sig: Take 1 tablet (20 mEq total) by mouth daily   pseudoephedrine (SUDAFED) 120 MG 12 hr tablet  Self No No   Sig: Take 1 tablet (120 mg total) by mouth 2 (two) times a day   Patient taking differently: Take 120 mg by mouth 2 (two) times a day as needed   pseudoephedrine (SUDAFED) 30 mg tablet  Self Yes No   Sig: Take 60 mg by mouth every 4 (four) hours as needed for congestion      Facility-Administered Medications: None       Past Medical History:   Diagnosis Date    Abnormal ECG     Abnormal Pap smear of cervix     Denial     Heart aneurysm     Heart murmur     Hypertension     Hypothyroidism     Infectious viral hepatitis     hep c    Obesity (BMI 30-39.9) 2023    Overweight 2019    PAD (peripheral artery disease) (HCC)        Past Surgical History:   Procedure Laterality Date    AUGMENTATION MAMMAPLASTY       SECTION      COLONOSCOPY  2021    ENDOSCOPY OF POUCH  2021       Family History   Problem Relation Age of Onset    Other Mother         Denial    Vaginal cancer Mother 66    Coronary artery disease Father     Other Father         Denial    Heart disease Brother     Celiac disease Son     Celiac disease Son     Breast cancer Maternal Aunt      I have reviewed and agree with the history as documented. E-Cigarette/Vaping    E-Cigarette Use Former User      E-Cigarette/Vaping Substances    Nicotine Yes     THC No     CBD No     Flavoring No     Other No     Unknown No      Social History     Tobacco Use    Smoking status: Every Day     Packs/day: 1.00     Years: 20.00     Total pack years: 20.00     Types: Cigarettes    Smokeless tobacco: Never   Vaping Use    Vaping Use: Former    Substances: Nicotine   Substance Use Topics    Alcohol use: Yes     Alcohol/week: 2.0 standard drinks of alcohol     Types: 1 Glasses of wine, 1 Cans of beer per week    Drug use: No       Review of Systems   Musculoskeletal:  Positive for back pain. Neurological:  Positive for weakness. Physical Exam  Physical Exam  Vitals and nursing note reviewed. Constitutional:       General: She is not in acute distress. Appearance: Normal appearance. She is well-developed. She is not ill-appearing, toxic-appearing or diaphoretic. HENT:      Head: Normocephalic and atraumatic. Eyes:      General:         Right eye: No discharge. Left eye: No discharge. Conjunctiva/sclera: Conjunctivae normal.      Pupils: Pupils are equal, round, and reactive to light. Neck:      Vascular: No JVD. Cardiovascular:      Pulses: Normal pulses. Pulmonary:      Effort: Pulmonary effort is normal. No respiratory distress. Breath sounds: No stridor. Musculoskeletal:         General: No swelling, tenderness or deformity. Normal range of motion. Cervical back: Normal range of motion and neck supple. Skin:     General: Skin is warm and dry. Capillary Refill: Capillary refill takes less than 2 seconds. Neurological:      General: No focal deficit present. Mental Status: She is alert and oriented to person, place, and time. Cranial Nerves: No cranial nerve deficit. Sensory: No sensory deficit.       Motor: No weakness or abnormal muscle tone. Coordination: Coordination normal.      Comments: 5/5 strength b/l lower extremities. Intact sensation. Vital Signs  ED Triage Vitals [11/30/23 1712]   Temperature Pulse Respirations Blood Pressure SpO2   98.5 °F (36.9 °C) (!) 53 18 121/69 95 %      Temp Source Heart Rate Source Patient Position - Orthostatic VS BP Location FiO2 (%)   Oral Monitor Sitting Left arm --      Pain Score       --           Vitals:    11/30/23 1712   BP: 121/69   Pulse: (!) 53   Patient Position - Orthostatic VS: Sitting         Visual Acuity      ED Medications  Medications - No data to display    Diagnostic Studies  Results Reviewed       None                   MRI lumbar spine wo contrast   Final Result by Sarah Hwang MD (11/30 2021)      Mild disc and facet degenerative change throughout the lumbar spine. No evidence of nerve root encroachment. Workstation performed: [id number]                    Procedures  Procedures         ED Course                               SBIRT 22yo+      Flowsheet Row Most Recent Value   Initial Alcohol Screen: US AUDIT-C     1. How often do you have a drink containing alcohol? 0 Filed at: 11/30/2023 1713   2. How many drinks containing alcohol do you have on a typical day you are drinking? 0 Filed at: 11/30/2023 1713   3b. FEMALE Any Age, or MALE 65+: How often do you have 4 or more drinks on one occassion? 0 Filed at: 11/30/2023 1713   Audit-C Score 0 Filed at: 11/30/2023 1713   EBONIE: How many times in the past year have you. .. Used an illegal drug or used a prescription medication for non-medical reasons? Never Filed at: 11/30/2023 1713                      Medical Decision Making  Problems Addressed:  Back pain: complicated acute illness or injury     Details: ddx includes cauda equina syndrome    Amount and/or Complexity of Data Reviewed  Radiology: ordered.              Disposition  Final diagnoses:   Back pain     Time reflects when diagnosis was documented in both MDM as applicable and the Disposition within this note       Time User Action Codes Description Comment    11/30/2023  8:26 PM Juan J Ashley Add [M54.9] Back pain           ED Disposition       ED Disposition   Discharge    Condition   Stable    Date/Time   Thu Nov 30, 2023 2026    [facility] discharge to home/self care. Follow-up Information    None         Patient's Medications   Discharge Prescriptions    No medications on file       No discharge procedures on file.     PDMP Review       None            ED Provider  Electronically Signed by             Mario Ayala MD  11/30/23 2043

## 2023-12-04 ENCOUNTER — OFFICE VISIT (OUTPATIENT)
Age: 63
End: 2023-12-04
Payer: COMMERCIAL

## 2023-12-04 VITALS
BODY MASS INDEX: 31.65 KG/M2 | WEIGHT: 190 LBS | TEMPERATURE: 97.6 F | SYSTOLIC BLOOD PRESSURE: 122 MMHG | HEIGHT: 65 IN | DIASTOLIC BLOOD PRESSURE: 69 MMHG | OXYGEN SATURATION: 99 % | RESPIRATION RATE: 18 BRPM | HEART RATE: 72 BPM

## 2023-12-04 DIAGNOSIS — M54.16 LUMBAR RADICULOPATHY: Primary | ICD-10-CM

## 2023-12-04 DIAGNOSIS — R60.0 BILATERAL LOWER EXTREMITY EDEMA: ICD-10-CM

## 2023-12-04 PROCEDURE — 99213 OFFICE O/P EST LOW 20 MIN: CPT | Performed by: INTERNAL MEDICINE

## 2023-12-04 RX ORDER — TORSEMIDE 20 MG/1
20 TABLET ORAL DAILY
Qty: 30 TABLET | Refills: 5 | Status: SHIPPED | OUTPATIENT
Start: 2023-12-04

## 2023-12-04 RX ORDER — CYCLOBENZAPRINE HCL 10 MG
10 TABLET ORAL 3 TIMES DAILY
Qty: 30 TABLET | Refills: 2 | Status: SHIPPED | OUTPATIENT
Start: 2023-12-04

## 2023-12-04 NOTE — PROGRESS NOTES
INTERNAL MEDICINE FOLLOW-UP OFFICE VISIT  [facility]    NAME: Melany Yu  AGE: 61 y.o. SEX: female  : 1960   MRN: [id number]    DATE: 2023  TIME: 3:17 PM    Assessment and Plan     1. Lumbar radiculopathy  Was told to take Tylenol along with the muscle relaxant. She will continue to take the gabapentin 600 mg 3 times a day. She was advised to follow-up with pain management and orthopedics  - cyclobenzaprine (FLEXERIL) 10 mg tablet; Take 1 tablet (10 mg total) by mouth 3 (three) times a day  Dispense: 30 tablet; Refill: 2    2. Bilateral lower extremity edema  Was told to discontinue the hydrochlorothiazide and start taking the torsemide for the leg swelling. Was advised to elevate her legs while sitting and to cut down on the sodium intake in the diet  Will follow-up on the CMP within the next 2 to 3 weeks. - torsemide (DEMADEX) 20 mg tablet; Take 1 tablet (20 mg total) by mouth daily  Dispense: 30 tablet; Refill: 5    - Counseling Documentation: patient was counseled regarding: diagnostic results, instructions for management, risk factor reductions, prognosis, patient and family education, risks and benefits of treatment options, and importance of compliance with treatment  - Medication Side Effects: Adverse side effects of medications were reviewed with the patient/guardian today. Return to office in: 1 month    Chief Complaint     Chief Complaint   Patient presents with    Back Pain       History of Present Illness     HPI  Patient is here for severe back pain radiating down the left leg which started a few days ago. She has been taking a high dose of gabapentin but it is not helping. She was given a muscle relaxant to be taken along with the Tylenol. She also has developed a lot of swelling in both her legs almost up to the knee and a little above it.     The following portions of the patient's history were reviewed and updated as appropriate: allergies, current medications, past family history, past medical history, past social history, past surgical history and problem list.    Review of Systems     Review of Systems   Constitutional:  Negative for chills, diaphoresis, fatigue and fever. HENT:  Negative for congestion, ear discharge, ear pain, hearing loss, postnasal drip, rhinorrhea, sinus pressure, sinus pain, sneezing, sore throat and voice change. Eyes:  Negative for pain, discharge, redness and visual disturbance. Respiratory:  Negative for cough, chest tightness, shortness of breath and wheezing. Cardiovascular:  Positive for leg swelling. Negative for chest pain and palpitations. Gastrointestinal:  Negative for abdominal distention, abdominal pain, blood in stool, constipation, diarrhea, nausea and vomiting. Endocrine: Negative for cold intolerance, heat intolerance, polydipsia, polyphagia and polyuria. Genitourinary:  Negative for dysuria, flank pain, frequency, hematuria and urgency. Musculoskeletal:  Positive for back pain. Negative for arthralgias, gait problem, joint swelling, myalgias, neck pain and neck stiffness. Skin:  Negative for rash. Neurological:  Negative for dizziness, tremors, syncope, facial asymmetry, speech difficulty, weakness, light-headedness, numbness and headaches. Hematological:  Does not bruise/bleed easily. Psychiatric/Behavioral:  Negative for behavioral problems, confusion and sleep disturbance. The patient is not nervous/anxious.         Active Problem List     Patient Active Problem List   Diagnosis    Acquired hypothyroidism    Essential hypertension    Recurrent major depressive disorder, in partial remission (HCC)    Mixed hyperlipidemia    Gastroesophageal reflux disease without esophagitis    Opioid dependence on agonist therapy (HCC)    Hyperglycemia    Overweight    Cigarette nicotine dependence without complication    Chronic pain of right knee    Chronic bilateral low back pain without sciatica    Peripheral vascular disease (HCC)    SOB (shortness of breath)    Esophageal dysphagia    History of colon polyps    DDD (degenerative disc disease), cervical    Chronic hepatitis C without hepatic coma (HCC)    Impingement syndrome of right shoulder    Numbness of face    Pancreatic cyst    Pain of left great toe    Bilateral hip pain    Lumbar radiculopathy    Bilateral lower extremity edema       Objective     /69 (BP Location: Left arm, Patient Position: Sitting, Cuff Size: Standard)   Pulse 72   Temp 97.6 °F (36.4 °C) (Tympanic)   Resp 18   Ht 5' 5" (1.651 m)   Wt 86.2 kg (190 lb)   SpO2 99%   BMI 31.62 kg/m²     Physical Exam  Constitutional:       General: She is not in acute distress. Appearance: She is well-developed. She is not diaphoretic. HENT:      Head: Normocephalic and atraumatic. Right Ear: External ear normal.      Left Ear: External ear normal.      Nose: Nose normal.   Eyes:      General: No scleral icterus. Right eye: No discharge. Left eye: No discharge. Conjunctiva/sclera: Conjunctivae normal.   Neck:      Thyroid: No thyromegaly. Vascular: No JVD. Trachea: No tracheal deviation. Cardiovascular:      Rate and Rhythm: Normal rate and regular rhythm. Heart sounds: Normal heart sounds. No murmur heard. No friction rub. No gallop. Pulmonary:      Effort: Pulmonary effort is normal. No respiratory distress. Breath sounds: Normal breath sounds. No wheezing or rales. Chest:      Chest wall: No tenderness. Abdominal:      General: Bowel sounds are normal. There is no distension. Palpations: Abdomen is soft. Tenderness: There is no abdominal tenderness. There is no guarding or rebound. Musculoskeletal:         General: Tenderness present. Normal range of motion. Cervical back: Normal range of motion and neck supple. Right lower leg: Edema present. Left lower leg: Edema present. Lymphadenopathy:      Cervical: No cervical adenopathy. Skin:     General: Skin is warm and dry. Findings: No erythema or rash. Neurological:      Mental Status: She is alert and oriented to person, place, and time. Cranial Nerves: No cranial nerve deficit. Motor: No abnormal muscle tone.       Coordination: Coordination normal.   Psychiatric:         Judgment: Judgment normal.         Pertinent Laboratory/Diagnostic Studies:        Current Medications       Current Outpatient Medications:     albuterol (PROVENTIL HFA,VENTOLIN HFA) 90 mcg/act inhaler, Inhale 1 puff every 6 (six) hours as needed for wheezing, Disp: 18 g, Rfl: 3    ammonium lactate (LAC-HYDRIN) 12 % lotion, Apply topically 2 (two) times a day as needed for dry skin, Disp: 400 g, Rfl: 0    aspirin 81 MG tablet, Take 1 tablet by mouth daily, Disp: , Rfl:     atorvastatin (LIPITOR) 20 mg tablet, TAKE 1 TABLET BY MOUTH EVERY DAY, Disp: 90 tablet, Rfl: 2    cyclobenzaprine (FLEXERIL) 10 mg tablet, Take 1 tablet (10 mg total) by mouth 3 (three) times a day, Disp: 30 tablet, Rfl: 2    DULoxetine (CYMBALTA) 60 mg delayed release capsule, TAKE 1 CAPSULE BY MOUTH EVERY DAY, Disp: 90 capsule, Rfl: 4    fluticasone (FLONASE) 50 mcg/act nasal spray, 1 spray into each nostril 2 (two) times a day, Disp: 11.1 mL, Rfl: 0    gabapentin (NEURONTIN) 300 mg capsule, Take 2 capsules (600 mg total) by mouth 2 (two) times a day, Disp: 360 capsule, Rfl: 2    hydrocortisone 1 % cream, Apply topically 4 (four) times a day as needed for rash, Disp: 30 g, Rfl: 0    levothyroxine 175 mcg tablet, TAKE 1 TABLET BY MOUTH EVERY DAY, Disp: 90 tablet, Rfl: 1    lisinopril (ZESTRIL) 10 mg tablet, TAKE 1 TABLET BY MOUTH EVERY DAY, Disp: 90 tablet, Rfl: 1    pantoprazole (PROTONIX) 40 mg tablet, TAKE 1 TABLET BY MOUTH EVERY DAY, Disp: 90 tablet, Rfl: 1    potassium chloride (K-DUR,KLOR-CON) 20 mEq tablet, Take 1 tablet (20 mEq total) by mouth daily, Disp: 30 tablet, Rfl: 5    pseudoephedrine (SUDAFED) 120 MG 12 hr tablet, Take 1 tablet (120 mg total) by mouth 2 (two) times a day (Patient taking differently: Take 120 mg by mouth 2 (two) times a day as needed), Disp: 10 tablet, Rfl: 0    pseudoephedrine (SUDAFED) 30 mg tablet, Take 60 mg by mouth every 4 (four) hours as needed for congestion, Disp: , Rfl:     Semaglutide-Weight Management (Wegovy) 0.25 MG/0.5ML, , Disp: , Rfl:     SUBOXONE 8-2 MG, USE 2 & 1/2 FILMS UNDER TONGUE DAILY, Disp: , Rfl: 0    torsemide (DEMADEX) 20 mg tablet, Take 1 tablet (20 mg total) by mouth daily, Disp: 30 tablet, Rfl: 5    Health Maintenance     Health Maintenance   Topic Date Due    Pneumococcal Vaccine: Pediatrics (0 to 5 Years) and At-Risk Patients (6 to 59 Years) (1 - PCV) 01/14/1966    Hepatitis A Vaccine (1 of 2 - Risk 2-dose series) Never done    DTaP,Tdap,and Td Vaccines (1 - Tdap) 01/14/1981    Hepatitis B Vaccine (1 of 3 - Risk 3-dose series) Never done    COVID-19 Vaccine (3 - Pfizer series) 07/29/2021    Breast Cancer Screening: Mammogram  08/24/2022    Influenza Vaccine (1) 09/01/2023    Annual Physical  01/24/2024    BMI: Followup Plan  01/26/2024    Lung Cancer Screening  03/01/2024    Depression Remission PHQ  04/27/2024    BMI: Adult  11/30/2024    Cervical Cancer Screening  01/24/2026    Colorectal Cancer Screening  09/26/2026    HIV Screening  Completed    HIB Vaccine  Aged Out    IPV Vaccine  Aged Out    Meningococcal ACWY Vaccine  Aged Out    HPV Vaccine  Aged Out    Hepatitis C Screening  Discontinued     Immunization History   Administered Date(s) Administered    COVID-19 PFIZER VACCINE 0.3 ML IM 05/12/2021, 06/03/2021    INFLUENZA 11/16/2018    Influenza, recombinant, quadrivalent,injectable, preservative free 11/16/2018, 09/23/2020, 10/04/2021, 12/13/2022    Influenza, seasonal, injectable 1960    Pneumococcal Polysaccharide PPV23 1960    Tdap 1960    Zoster 1960         MD [facility]

## 2023-12-05 ENCOUNTER — TELEPHONE (OUTPATIENT)
Age: 63
End: 2023-12-05

## 2023-12-05 NOTE — TELEPHONE ENCOUNTER
----- Message from Minus Parent, MD sent at 12/4/2023  5:18 PM EST -----  Please tell the patient to get her blood work done in the next 2 weeks and come back for a checkup

## 2023-12-07 ENCOUNTER — OFFICE VISIT (OUTPATIENT)
Age: 63
End: 2023-12-07
Payer: COMMERCIAL

## 2023-12-07 VITALS — HEIGHT: 65 IN | BODY MASS INDEX: 31.65 KG/M2 | WEIGHT: 190 LBS

## 2023-12-07 DIAGNOSIS — D18.01 CHERRY ANGIOMA: ICD-10-CM

## 2023-12-07 DIAGNOSIS — Z13.89 SCREENING FOR SKIN CONDITION: Primary | ICD-10-CM

## 2023-12-07 DIAGNOSIS — L82.1 SEBORRHEIC KERATOSIS: ICD-10-CM

## 2023-12-07 DIAGNOSIS — D22.9 MULTIPLE NEVI: ICD-10-CM

## 2023-12-07 DIAGNOSIS — L81.4 LENTIGINES: ICD-10-CM

## 2023-12-07 LAB
DME PARACHUTE DELIVERY DATE ACTUAL: NORMAL
DME PARACHUTE DELIVERY DATE REQUESTED: NORMAL
DME PARACHUTE ITEM DESCRIPTION: NORMAL
DME PARACHUTE ORDER STATUS: NORMAL
DME PARACHUTE SUPPLIER NAME: NORMAL
DME PARACHUTE SUPPLIER PHONE: NORMAL

## 2023-12-07 PROCEDURE — 99203 OFFICE O/P NEW LOW 30 MIN: CPT | Performed by: DERMATOLOGY

## 2023-12-07 NOTE — PATIENT INSTRUCTIONS
When outside we recommend using a wide brim hat, sunglasses, long sleeve and pants, sunscreen with SPF 35+ with reapplication every 2 hours, or SPF specific clothing     MELANOCYTIC NEVI ("Moles")    Melanocytic nevi ("moles") are tan or brown, raised or flat areas of the skin which have an increased number of melanocytes. Melanocytes are the cells in our body which make pigment and account for skin color. Some moles are present at birth (I.e., "congenital nevi"), while others come up later in life (i.e., "acquired nevi"). The sun can stimulate the body to make more moles. Sunburns are not the only thing that triggers more moles. Chronic sun exposure can do it too. Clinically distinguishing a healthy mole from melanoma may be difficult, even for experienced dermatologists. The "ABCDE's" of moles have been suggested as a means of helping to alert a person to a suspicious mole and the possible increased risk of melanoma. The suggestions for raising alert are as follows:    Asymmetry: Healthy moles tend to be symmetric, while melanomas are often asymmetric. Asymmetry means if you draw a line through the mole, the two halves do not match in color, size, shape, or surface texture. Asymmetry can be a result of rapid enlargement of a mole, the development of a raised area on a previously flat lesion, scaling, ulceration, bleeding or scabbing within the mole. Any mole that starts to demonstrate "asymmetry" should be examined promptly by a board certified dermatologist.     Border: Healthy moles tend to have discrete, even borders. The border of a melanoma often blends into the normal skin and does not sharply delineate the mole from normal skin. Any mole that starts to demonstrate "uneven borders" should be examined promptly by a board certified dermatologist.     Color: Healthy moles tend to be one color throughout.   Melanomas tend to be made up of different colors ranging from dark black, blue, white, or red.  Any mole that demonstrates a color change should be examined promptly by a board certified dermatologist.     Diameter: Healthy moles tend to be smaller than 0.6 cm in size; an exception are "congenital nevi" that can be larger. Melanomas tend to grow and can often be greater than 0.6 cm (1/4 of an inch, or the size of a pencil eraser). This is only a guideline, and many normal moles may be larger than 0.6 cm without being unhealthy. Any mole that starts to change in size (small to bigger or bigger to smaller) should be examined promptly by a board certified dermatologist.     Evolving: Healthy moles tend to "stay the same."  Melanomas may often show signs of change or evolution such as a change in size, shape, color, or elevation. Any mole that starts to itch, bleed, crust, burn, hurt, or ulcerate or demonstrate a change or evolution should be examined promptly by a board certified dermatologist.      Dysplastic Nevi  Dysplastic moles are moles that fit the ABCDE rules of melanoma but are not identified as melanomas when examined under the microscope. They may indicate an increased risk of melanoma in that person. If there is a family history of melanoma, most experts agree that the person may be at an increased risk for developing a melanoma. Experts still do not agree on what dysplastic moles mean in patients without a personal or family history of melanoma. Dysplastic moles are usually larger than common moles and have different colors within it with irregular borders. The appearance can be very similar to a melanoma. Biopsies of dysplastic moles may show abnormalities which are different from a regular mole. Melanoma  Malignant melanoma is a type of skin cancer that can be deadly if it spreads throughout the body. The incidence of melanoma in the [facility] is growing faster than any other cancer.  Melanoma usually grows near the surface of the skin for a period of time, and then begins to grow deeper into the skin. Once it grows deeper into the skin, the risk of spread to other organs greatly increases. Therefore, early detection and removal of a malignant melanoma may result in a better chance at a complete cure; removal after the tumor has spread may not be as effective, leading to worse clinical outcomes such as death. The true rate of nevus transformation into a melanoma is unknown. It has been estimated that the lifetime risk for any acquired melanocytic nevus on any 21year-old individual transforming into melanoma by age 80 is 0.03% (1 in 3,164) for men and 0.009% (1 in 10,800) for women. The appearance of a "new mole" remains one of the most reliable methods for identifying a malignant melanoma. Occasionally, melanomas appear as rapidly growing, blue-black, dome-shaped bumps within a previous mole or previous area of normal skin. Other times, melanomas are suspected when a mole suddenly appears or changes. Itching, burning, or pain in a pigmented lesion should increase suspicion, but most patients with early melanoma have no skin discomfort whatsoever. Melanoma can occur anywhere on the skin, including areas that are difficult for self-examination. Many melanomas are first noticed by other family members. Suspicious-looking moles may be removed for microscopic examination. You may be able to prevent death from melanoma by doing two simple things:    Try to avoid unnecessary sun exposure and protect your skin when it is exposed to the sun. People who live near the equator, people who have intermittent exposures to large amounts of sun, and people who have had sunburns in childhood or adolescence have an increased risk for melanoma. Sun sense and vigilant sun protection may be keys to helping to prevent melanoma.   We recommend wearing UPF-rated sun protective clothing and sunglasses whenever possible and applying a moisturizer-sunscreen combination product (SPF 50+) such as Neutrogena Daily Defense to sun exposed areas of skin at least three times a day. Have your moles regularly examined by a board certified dermatologist AND by yourself or a family member/friend at home. We recommend that you have your moles examined at least once a year by a board certified dermatologist.  Use your birthday as an annual reminder to have your "Birthday Suit" (I.e., your skin) examined; it is a nice birthday gift to yourself to know that your skin is healthy appearing! Additionally, at-home self examinations may be helpful for detecting a possible melanoma. Use the ABCDEs we discussed and check your moles once a month at home. SEBORRHEIC KERATOSIS  A seborrheic keratosis is a harmless warty spot that appears during adult life as a common sign of skin aging. Seborrheic keratoses can arise on any area of skin, covered or uncovered, with the usual exception of the palms and soles. They do not arise from mucous membranes. Seborrheic keratoses can have highly variable appearance. Seborrheic keratoses are extremely common. It has been estimated that over 90% of adults over the age of 61 years have one or more of them. They occur in males and females of all races, typically beginning to erupt in the 35s or 45s. They are uncommon under the age of 21 years. The precise cause of seborrhoeic keratoses is not known. Seborrhoeic keratoses are considered degenerative in nature. As time goes by, seborrheic keratoses tend to become more numerous. Some people inherit a tendency to develop a very large number of them; some people may have hundreds of them. The name "seborrheic keratosis" is misleading, because these lesions are not limited to a seborrhoeic distribution (scalp, mid-face, chest, upper back), nor are they formed from sebaceous glands, nor are they associated with sebum -- which is greasy.   Seborrheic keratosis may also be called "SK," "Seb K," "basal cell papilloma," "senile wart," or "barnacle."      There is no easy way to remove multiple lesions on a single occasion. Unless a specific lesion is "inflamed" and is causing pain or stinging/burning or is bleeding, most insurance companies do not authorize treatment. ANGIOMA ("CHERRY ANGIOMA")  Granados angiomas markedly increase in number from about the age of 36, so it has been estimated that 75% of people over 76years of age have them. Although they also called "senile angiomas," they can occur in young people too - 5% of adolescents have been found to have them. Cherry angiomas are very common in males and females of any age or race, with no difference in sexes or races affected. They are however more noticeable in white skin than in skin of colour. There may be a family history of similar lesions. Eruptive (very large number appearing in a short period of time) cherry angiomas have been rarely reported to be associated with internal malignancy and pregnancy.

## 2023-12-07 NOTE — PROGRESS NOTES
[facility] Note     Patient Name: Albaro Watters  Encounter Date: December 7, 2023     Have you been cared for by a [facility] in the last 3 years and, if so, which description applies to you? NO. I am considered a "new" patient and must complete all patient intake questions. I am FEMALE/of child-bearing potential.    REVIEW OF SYSTEMS:  Have you recently had or currently have any of the following? Recent fever or chills? No  Any non-healing wound? No  Are you pregnant or planning to become pregnant? No  Are you currently or planning to be nursing or breast feeding? No   PAST MEDICAL HISTORY:  Have you personally ever had or currently have any of the following? If "YES," then please provide more detail. Skin cancer (such as Melanoma, Basal Cell Carcinoma, Squamous Cell Carcinoma? No  Tuberculosis, HIV/AIDS, Hepatitis B or C: YES, HEPATITIS C  Radiation Treatment No   HISTORY OF IMMUNOSUPPRESSION:   Do you have a history of any of the following:  Systemic Immunosuppression such as Diabetes, Biologic or Immunotherapy, Chemotherapy, Organ Transplantation, Bone Marrow Transplantation? NO    Answering "YES" requires the addition of the dotphrase "IMMUNOSUPPRESSED" as the first diagnosis of the patient's visit. FAMILY HISTORY:  Any "first degree relatives" (parent, brother, sister, or child) with the following? Skin Cancer, Pancreatic or Other Cancer? No   PATIENT EXPERIENCE:    Do you want the Dermatologist to perform a COMPLETE skin exam today including a clinical examination under the "bra and underwear" areas? Yes  If necessary, do we have your permission to call and leave a detailed message on your Preferred Phone number that includes your specific medical information?   Yes      Allergies   Allergen Reactions    Penicillins Itching    Motrin [Ibuprofen] Blisters      Current Outpatient Medications:     albuterol (PROVENTIL HFA,VENTOLIN HFA) 90 mcg/act inhaler, Inhale 1 puff every 6 (six) hours as needed for wheezing, Disp: 18 g, Rfl: 3    ammonium lactate (LAC-HYDRIN) 12 % lotion, Apply topically 2 (two) times a day as needed for dry skin, Disp: 400 g, Rfl: 0    aspirin 81 MG tablet, Take 1 tablet by mouth daily, Disp: , Rfl:     atorvastatin (LIPITOR) 20 mg tablet, TAKE 1 TABLET BY MOUTH EVERY DAY, Disp: 90 tablet, Rfl: 2    cyclobenzaprine (FLEXERIL) 10 mg tablet, Take 1 tablet (10 mg total) by mouth 3 (three) times a day, Disp: 30 tablet, Rfl: 2    DULoxetine (CYMBALTA) 60 mg delayed release capsule, TAKE 1 CAPSULE BY MOUTH EVERY DAY, Disp: 90 capsule, Rfl: 4    fluticasone (FLONASE) 50 mcg/act nasal spray, 1 spray into each nostril 2 (two) times a day, Disp: 11.1 mL, Rfl: 0    gabapentin (NEURONTIN) 300 mg capsule, Take 2 capsules (600 mg total) by mouth 2 (two) times a day, Disp: 360 capsule, Rfl: 2    hydrocortisone 1 % cream, Apply topically 4 (four) times a day as needed for rash, Disp: 30 g, Rfl: 0    levothyroxine 175 mcg tablet, TAKE 1 TABLET BY MOUTH EVERY DAY, Disp: 90 tablet, Rfl: 1    lisinopril (ZESTRIL) 10 mg tablet, TAKE 1 TABLET BY MOUTH EVERY DAY, Disp: 90 tablet, Rfl: 1    pantoprazole (PROTONIX) 40 mg tablet, TAKE 1 TABLET BY MOUTH EVERY DAY, Disp: 90 tablet, Rfl: 1    potassium chloride (K-DUR,KLOR-CON) 20 mEq tablet, Take 1 tablet (20 mEq total) by mouth daily, Disp: 30 tablet, Rfl: 5    SUBOXONE 8-2 MG, USE 2 & 1/2 FILMS UNDER TONGUE DAILY, Disp: , Rfl: 0    torsemide (DEMADEX) 20 mg tablet, Take 1 tablet (20 mg total) by mouth daily, Disp: 30 tablet, Rfl: 5    pseudoephedrine (SUDAFED) 120 MG 12 hr tablet, Take 1 tablet (120 mg total) by mouth 2 (two) times a day (Patient not taking: Reported on 12/7/2023), Disp: 10 tablet, Rfl: 0    pseudoephedrine (SUDAFED) 30 mg tablet, Take 60 mg by mouth every 4 (four) hours as needed for congestion (Patient not taking: Reported on 12/7/2023), Disp: , Rfl:     Semaglutide-Weight Management (Wegovy) 0.25 MG/0.5ML, , Disp: , Rfl:           Whom besides the patient is providing clinical information about today's encounter? NO ADDITIONAL HISTORIAN (patient alone provided history)    Physical Exam and Assessment/Plan by Diagnosis:    Chief complaint: Patient is a 62 y/o female present for a routine skin exam without personal history of skin cancer and several spots of concern. MELANOCYTIC NEVI ("Moles")    Physical Exam:  Anatomic Location Affected: Mostly on sun-exposed areas of the trunk and extremities  Morphological Description:  Scattered, 1-4mm round to ovoid, symmetrical-appearing, even bordered, skin colored to dark brown macules/papules, mostly in sun-exposed areas  Pertinent Positives:  Pertinent Negatives: Additional History of Present Condition:  present on exam    Assessment and Plan:  Based on a thorough discussion of this condition and the management approach to it (including a comprehensive discussion of the known risks, side effects and potential benefits of treatment), the patient (family) agrees to implement the following specific plan:  Provided handout with information regarding the ABCDE's of moles   Recommend routine skin exams every year   Sun avoidance, protective clothing (known as UPF clothing), and the use of at least SPF 30 sunscreens is advised. Sunscreen should be reapplied every two hours when outside. SEBORRHEIC KERATOSIS; NON-INFLAMED    Physical Exam:  Anatomic Location Affected:  scattered across trunk, extremities  Morphological Description:  Flat and raised, waxy, smooth to warty textured, yellow to brownish-grey to dark brown to blackish, discrete, "stuck-on" appearing papules. Pertinent Positives:  Pertinent Negatives: Additional History of Present Condition:  Patient reports new bumps on the skin. Denies itch, burn, pain, bleeding or ulceration. Present constantly; nothing seems to make it worse or better. No prior treatment.       Assessment and Plan:  Based on a thorough discussion of this condition and the management approach to it (including a comprehensive discussion of the known risks, side effects and potential benefits of treatment), the patient (family) agrees to implement the following specific plan:  Reassured benign    ANGIOMA ("CHERRY ANGIOMA")    Physical Exam:  Anatomic Location: scattered across sun exposed areas of the trunk and extremities   Morphologic Description: Firm red to reddish-blue discrete papules  Pertinent Positives:  Pertinent Negatives: Additional History of Present Condition:  Present on exam.     Assessment and Plan:  Reassured benign    LENTIGO    Physical Exam:  Anatomic Location Affected:  sun exposed areas face  Morphological Description:  tan/brown macules  Pertinent Positives:  Pertinent Negatives: Additional History of Present Condition:  Present on exam    Assessment and Plan:  Based on a thorough discussion of this condition and the management approach to it (including a comprehensive discussion of the known risks, side effects and potential benefits of treatment), the patient (family) agrees to implement the following specific plan:  Monitor for change    What is a lentigo? A lentigo is a pigmented flat or slightly raised lesion with a clearly defined edge. Unlike an ephelis (freckle), it does not fade in the winter months. There are several kinds of lentigo. The name lentigo originally referred to its appearance resembling a small lentil. The plural of lentigo is lentigines, although “lentigos” is also in common use. Who gets lentigines? Lentigines can affect males and females of all ages and races. Solar lentigines are especially prevalent in fair skinned adults. Lentigines associated with syndromes are present at birth or arise during childhood. What causes lentigines?   Common forms of lentigo are due to exposure to ultraviolet radiation:  Sun damage including sunburn   Indoor tanning   Phototherapy, especially photochemotherapy (PUVA)    Ionizing radiation, eg radiation therapy, can also cause lentigines. Several familial syndromes associated with widespread lentigines originate from mutations in Gabe-MAP kinase, mTOR signaling and PTEN pathways. What are the clinical features of lentigines? Lentigines have been classified into several different types depending on what they look like, where they appear on the body, causative factors, and whether they are associated to other diseases or conditions. Lentigines may be solitary or more often, multiple. Most lentigines are smaller than 5 mm in diameter.     Lentigo simplex  A precursor to junctional naevus   Arises during childhood and early adult life   Found on trunk and limbs   Small brown round or oval macule or thin plaque   Jagged or smooth edge   May have a dry surface   May disappear in time  Solar lentigo  A precursor to seborrhoeic keratosis   Found on chronically sun exposed sites such as hands, face, lower legs   May also follow sunburn to shoulders   Yellow, light or dark brown regular or irregular macule or thin plaque   May have a dry surface   Often has moth-eaten outline   Can slowly enlarge to several centimeters in diameter   May disappear, often through the process known as lichenoid keratosis   When atypical in appearance, may be difficult to distinguish from melanoma in situ  Ink spot lentigo  Also known as reticulated lentigo   Few in number compared to solar lentigines   Follows sunburn in very fair skinned individuals   Dark brown to black irregular ink spot-like macule  PUVA lentigo  Similar to ink spot lentigo but follows photochemotherapy (PUVA)   Location anywhere exposed to PUVA  Tanning bed lentigo  Similar to ink spot lentigo but follows indoor tanning   Location anywhere exposed to tanning bed  Radiation lentigo  Occurs in site of irradiation (accidental or therapeutic)   Associated with late-stage radiation dermatitis: epidermal atrophy, subcutaneous fibrosis, keratosis, telangiectasias  Melanotic macule  Mucosal surfaces or adjacent glabrous skin eg lip, vulva, penis, anus   Light to dark brown   Also called mucosal melanosis  Generalised lentigines  Found on any exposed or covered site from early childhood   Small macules may merge to form larger patches   Not associated with a syndrome   Also called lentigines profusa, multiple lentigines  Agminated lentigines  Naevoid eruption of lentigos confined to a single segmental area   Sharp demarcation in midline   May have associated neurological and developmental abnormalities  Patterned lentigines  Inherited tendency to lentigines on face, lips, buttocks, palms, soles   Recognised mainly in people of  ethnicity  Centrofacial neurodysraphic lentiginosis  Associated with mental retardation  Lentiginosis syndromes  Syndromes include LEOPARD/Harvey, Peutz-Jeghers, Laugier-Hunziker, Moynahan, Xeroderma pigmentosum, myxoma syndromes (NAVARRETE, NAME, Pillai), Ruvalcaba-Myhre-Muniz, Bannayan-Zonnana syndrome, Cowden disease (multiple hamartoma syndrome )   Inheritance is autosomal dominant; sporadic cases common   Widespread lentigines present at birth or arise in early childhood   Associated with neural, endocrine, and mesenchymal tumors    How is the diagnosis made? Lentigines are usually diagnosed clinically by their typical appearance. Concern regarding possibility of melanoma may lead to:  Dermatoscopy   Diagnostic excision biopsy    Histopathology of a lentigo shows: Thickened epidermis   An increased number of melanocytes along the basal layer of epidermis   Unlike junctional melanocytic naevus, there are no nests of melanocytes   Increased melanin pigment within the keratinocytes   Additional features depending on type of lentigo    In contrast, an ephelis (freckle) shows sun-induced increased melanin within the keratinocytes, without an increase in number of cells.   What is the treatment for lentigines? Most lentigines are left alone. Attempts to lighten them may not be successful. The following approaches are used:  SPF 50+ broad-spectrum sunscreen   Hydroquinone bleaching cream   Alpha hydroxy acids   Vitamin C   Retinoids   Azelaic acid   Chemical peels  Individual lesions can be permanently removed using:  Cryotherapy   Intense pulsed light   Pigment lasers    How can lentigines be prevented? Lentigines associated with exposure ultraviolet radiation can be prevented by very careful sun protection. Clothing is more successful at preventing new lentigines than are sunscreens. What is the outlook for lentigines? Lentigines usually persist. They may increase in number with age and sun exposure. Some in sun-protected sites may fade and disappear.       Scribe Attestation      I,:  Tyler Guaman am acting as a scribe while in the presence of the attending physician.:       I,:  Wilder Holman MD personally performed the services described in this documentation    as scribed in my presence.:

## 2023-12-08 ENCOUNTER — APPOINTMENT (OUTPATIENT)
Age: 63
End: 2023-12-08
Payer: COMMERCIAL

## 2023-12-08 DIAGNOSIS — R73.9 HYPERGLYCEMIA: ICD-10-CM

## 2023-12-08 DIAGNOSIS — E78.2 MIXED HYPERLIPIDEMIA: ICD-10-CM

## 2023-12-08 LAB
ALBUMIN SERPL BCP-MCNC: 4.1 G/DL (ref 3.5–5)
ALP SERPL-CCNC: 43 U/L (ref 34–104)
ALT SERPL W P-5'-P-CCNC: 15 U/L (ref 7–52)
ANION GAP SERPL CALCULATED.3IONS-SCNC: 5 MMOL/L
AST SERPL W P-5'-P-CCNC: 22 U/L (ref 13–39)
BASOPHILS # BLD AUTO: 0.05 THOUSANDS/ÂΜL (ref 0–0.1)
BASOPHILS NFR BLD AUTO: 1 % (ref 0–1)
BILIRUB SERPL-MCNC: 0.89 MG/DL (ref 0.2–1)
BUN SERPL-MCNC: 15 MG/DL (ref 5–25)
CALCIUM SERPL-MCNC: 9.9 MG/DL (ref 8.4–10.2)
CHLORIDE SERPL-SCNC: 101 MMOL/L (ref 96–108)
CHOLEST SERPL-MCNC: 123 MG/DL
CO2 SERPL-SCNC: 34 MMOL/L (ref 21–32)
CREAT SERPL-MCNC: 0.67 MG/DL (ref 0.6–1.3)
EOSINOPHIL # BLD AUTO: 0.08 THOUSAND/ÂΜL (ref 0–0.61)
EOSINOPHIL NFR BLD AUTO: 1 % (ref 0–6)
ERYTHROCYTE [DISTWIDTH] IN BLOOD BY AUTOMATED COUNT: 13.2 % (ref 11.6–15.1)
GFR SERPL CREATININE-BSD FRML MDRD: 93 ML/MIN/1.73SQ M
GLUCOSE P FAST SERPL-MCNC: 96 MG/DL (ref 65–99)
HCT VFR BLD AUTO: 44.4 % (ref 34.8–46.1)
HDLC SERPL-MCNC: 43 MG/DL
HGB BLD-MCNC: 15.3 G/DL (ref 11.5–15.4)
IMM GRANULOCYTES # BLD AUTO: 0.03 THOUSAND/UL (ref 0–0.2)
IMM GRANULOCYTES NFR BLD AUTO: 0 % (ref 0–2)
LDLC SERPL CALC-MCNC: 59 MG/DL (ref 0–100)
LYMPHOCYTES # BLD AUTO: 2.69 THOUSANDS/ÂΜL (ref 0.6–4.47)
LYMPHOCYTES NFR BLD AUTO: 27 % (ref 14–44)
MCH RBC QN AUTO: 32.6 PG (ref 26.8–34.3)
MCHC RBC AUTO-ENTMCNC: 34.5 G/DL (ref 31.4–37.4)
MCV RBC AUTO: 95 FL (ref 82–98)
MONOCYTES # BLD AUTO: 0.84 THOUSAND/ÂΜL (ref 0.17–1.22)
MONOCYTES NFR BLD AUTO: 9 % (ref 4–12)
NEUTROPHILS # BLD AUTO: 6.24 THOUSANDS/ÂΜL (ref 1.85–7.62)
NEUTS SEG NFR BLD AUTO: 62 % (ref 43–75)
NONHDLC SERPL-MCNC: 80 MG/DL
NRBC BLD AUTO-RTO: 0 /100 WBCS
PLATELET # BLD AUTO: 268 THOUSANDS/UL (ref 149–390)
PMV BLD AUTO: 9.8 FL (ref 8.9–12.7)
POTASSIUM SERPL-SCNC: 3.6 MMOL/L (ref 3.5–5.3)
PROT SERPL-MCNC: 6.9 G/DL (ref 6.4–8.4)
RBC # BLD AUTO: 4.69 MILLION/UL (ref 3.81–5.12)
SODIUM SERPL-SCNC: 140 MMOL/L (ref 135–147)
TRIGL SERPL-MCNC: 107 MG/DL
TSH SERPL DL<=0.05 MIU/L-ACNC: 1.29 UIU/ML (ref 0.45–4.5)
WBC # BLD AUTO: 9.93 THOUSAND/UL (ref 4.31–10.16)

## 2023-12-08 PROCEDURE — 80053 COMPREHEN METABOLIC PANEL: CPT

## 2023-12-08 PROCEDURE — 83036 HEMOGLOBIN GLYCOSYLATED A1C: CPT

## 2023-12-08 PROCEDURE — 84443 ASSAY THYROID STIM HORMONE: CPT

## 2023-12-08 PROCEDURE — 36415 COLL VENOUS BLD VENIPUNCTURE: CPT

## 2023-12-08 PROCEDURE — 80061 LIPID PANEL: CPT

## 2023-12-08 PROCEDURE — 85025 COMPLETE CBC W/AUTO DIFF WBC: CPT

## 2023-12-09 LAB
EST. AVERAGE GLUCOSE BLD GHB EST-MCNC: 126 MG/DL
HBA1C MFR BLD: 6 %

## 2023-12-11 DIAGNOSIS — E03.9 ACQUIRED HYPOTHYROIDISM: ICD-10-CM

## 2023-12-11 DIAGNOSIS — R21 RASH: ICD-10-CM

## 2023-12-11 RX ORDER — AMMONIUM LACTATE 12 G/100G
LOTION TOPICAL
Qty: 400 ML | Refills: 0 | Status: SHIPPED | OUTPATIENT
Start: 2023-12-11

## 2023-12-11 RX ORDER — LEVOTHYROXINE SODIUM 175 UG/1
TABLET ORAL
Qty: 90 TABLET | Refills: 1 | Status: SHIPPED | OUTPATIENT
Start: 2023-12-11

## 2023-12-13 ENCOUNTER — TELEPHONE (OUTPATIENT)
Age: 63
End: 2023-12-13

## 2023-12-13 ENCOUNTER — TELEPHONE (OUTPATIENT)
Dept: OBGYN CLINIC | Facility: HOSPITAL | Age: 63
End: 2023-12-13

## 2023-12-13 NOTE — TELEPHONE ENCOUNTER
Caller: Patient    Doctor: Julian Barreto    Reason for call:  Patient is calling asking if there is a nerve block she can have for her back. She states its an emergency because she's in a lot of pain. He stated her legs and feet swollen. Please advise. Call back#: [phone]- do not leave a message, she is unable to retreive voicemails.

## 2023-12-13 NOTE — TELEPHONE ENCOUNTER
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  We can have her see spinea nd pain to discuss possible interventional injection however I believe physical therapy may be helpful for her as well

## 2023-12-13 NOTE — TELEPHONE ENCOUNTER
Reviewed chart, pt seen 11/30/23 by Dr Castillo Later for lower back pain and was sent to ER from office visit due to possible concern of Cauda equina syndrome. Pt did not answer phone call, left message on voicemail anyway to return call. Will call back later or await call back.

## 2023-12-13 NOTE — TELEPHONE ENCOUNTER
Call to this patient on mobile 1 times I received a message that the number I can not be connected at this time. I tried a second time and the call went to voice mail, no message left as directed in earlier note. I also tied the home phone number in the demos and that number is no longer in service.

## 2023-12-14 DIAGNOSIS — E87.6 HYPOKALEMIA: ICD-10-CM

## 2023-12-14 RX ORDER — POTASSIUM CHLORIDE 1500 MG/1
20 TABLET, EXTENDED RELEASE ORAL DAILY
Qty: 90 TABLET | Refills: 2 | Status: SHIPPED | OUTPATIENT
Start: 2023-12-14

## 2023-12-15 DIAGNOSIS — M51.36 DEGENERATIVE DISC DISEASE, LUMBAR: Primary | ICD-10-CM

## 2023-12-15 NOTE — TELEPHONE ENCOUNTER
Alma Cochran is not able to go to therapy due to her taking care of her 80year old Dad. She would like the Spine [facility] Referral for possible injections.

## 2023-12-15 NOTE — TELEPHONE ENCOUNTER
Patient called to make her aware the referral is in the system.  she was given the number for [facility] should she not receive a call to schedule and eval.

## 2023-12-15 NOTE — TELEPHONE ENCOUNTER
08/16/17 at 17:53 PM, patient returns for reading of Tuberculin Skin Test.   Reading is 0 mm induration.   0 MM redness .  Negative for TB by [facility]   Patient given completed paper work.    Lillian Cooley PA-C    Supervising Physician   Bruce Lebron DO   Referral placed

## 2023-12-27 DIAGNOSIS — R60.0 BILATERAL LOWER EXTREMITY EDEMA: ICD-10-CM

## 2023-12-27 RX ORDER — TORSEMIDE 20 MG/1
20 TABLET ORAL DAILY
Qty: 90 TABLET | Refills: 2 | Status: SHIPPED | OUTPATIENT
Start: 2023-12-27

## 2024-01-04 DIAGNOSIS — R21 RASH: ICD-10-CM

## 2024-01-08 ENCOUNTER — CONSULT (OUTPATIENT)
Dept: PAIN MEDICINE | Facility: CLINIC | Age: 64
End: 2024-01-08
Payer: COMMERCIAL

## 2024-01-08 ENCOUNTER — APPOINTMENT (OUTPATIENT)
Dept: RADIOLOGY | Facility: CLINIC | Age: 64
End: 2024-01-08
Payer: COMMERCIAL

## 2024-01-08 VITALS
SYSTOLIC BLOOD PRESSURE: 152 MMHG | BODY MASS INDEX: 31.65 KG/M2 | DIASTOLIC BLOOD PRESSURE: 82 MMHG | HEIGHT: 65 IN | HEART RATE: 76 BPM | WEIGHT: 190 LBS

## 2024-01-08 DIAGNOSIS — M47.816 LUMBAR SPONDYLOSIS: Primary | ICD-10-CM

## 2024-01-08 DIAGNOSIS — M46.1 SACROILIITIS (HCC): ICD-10-CM

## 2024-01-08 DIAGNOSIS — G89.29 ACUTE EXACERBATION OF CHRONIC LOW BACK PAIN: ICD-10-CM

## 2024-01-08 DIAGNOSIS — R10.32 LEFT GROIN PAIN: ICD-10-CM

## 2024-01-08 DIAGNOSIS — M54.50 ACUTE EXACERBATION OF CHRONIC LOW BACK PAIN: ICD-10-CM

## 2024-01-08 PROCEDURE — 99204 OFFICE O/P NEW MOD 45 MIN: CPT | Performed by: STUDENT IN AN ORGANIZED HEALTH CARE EDUCATION/TRAINING PROGRAM

## 2024-01-08 PROCEDURE — 73502 X-RAY EXAM HIP UNI 2-3 VIEWS: CPT

## 2024-01-08 RX ORDER — AMMONIUM LACTATE 12 G/100G
LOTION TOPICAL
Qty: 400 ML | Refills: 0 | Status: SHIPPED | OUTPATIENT
Start: 2024-01-08

## 2024-01-08 RX ORDER — MELOXICAM 15 MG/1
15 TABLET ORAL DAILY PRN
Qty: 30 TABLET | Refills: 0 | Status: SHIPPED | OUTPATIENT
Start: 2024-01-08

## 2024-01-08 NOTE — PROGRESS NOTES
Assessment:  1. Lumbar spondylosis    2. Sacroiliitis (HCC)    3. Acute exacerbation of chronic low back pain    4. Left groin pain        Plan:  Orders Placed This Encounter   Procedures    XR hip/pelv 2-3 vws left if performed     Standing Status:   Future     Standing Expiration Date:   1/8/2028     Scheduling Instructions:      Bring along any outside films relating to this procedure.          [place] spine and pain procedure     Standing Status:   Future     Standing Expiration Date:   1/8/2028     Order Specific Question:   Reason for Exam:     Answer:   left SI joint injection     Order Specific Question:   Anticoagulant hold needed?     Answer:   no       New Medications Ordered This Visit   Medications    meloxicam (MOBIC) 15 mg tablet     Sig: Take 1 tablet (15 mg total) by mouth daily as needed for moderate pain     Dispense:  30 tablet     Refill:  0       My impressions and treatment recommendations were discussed in detail with the patient, who verbalized understanding and had no further questions.    This is a 63-year-old female presents her office with acute exacerbation of low back pain.  She has severe left-sided low back pain with radiation into the left hip and left groin.  Does not appear radicular in nature appears mostly musculoskeletal.  She has notable facet disease on imaging which is likely aggravated.  She also has notable tenderness palpation over the left SI joint with pain with provocative maneuvers notable low.  Discussed left SI joint injection with fluoroscopic guidance help with her symptoms.  Additionally, however, she has pain in the left groin which is exacerbated with flexion of the left hip and provocative maneuvers notable low.  She may have underlying intra-articular hip pathology and I will order x-ray of the left hip to further assess.  If there is notable degenerative disease, would recommend hip injection.    In the meantime give her a short course of meloxicam 15 mg daily as  needed.  I did discuss with the patient that she is taking blood pressure medications and continues NSAID use is not recommended.      [place] Prescription Drug Monitoring Program report was reviewed and was appropriate     Complete risks and benefits including bleeding, infection, tissue reaction, nerve injury and allergic reaction were discussed. The approach was demonstrated using models and literature was provided. Verbal and written consent was obtained.     Discharge instructions were provided. I personally saw and examined the patient and I agree with the above discussed plan of care.    History of Present Illness:    Delaney Garza is a 63 y.o. female who presents to [facility] for initial evaluation of the above stated pain complaints. The patient has a past medical and chronic pain history as outlined in the assessment section. She was referred by Oj Corrales DO  [address] [place] [address] .    Patient is here with chief complaint of left-sided back pain radiating into the groin and into the left leg.  Also with notable swelling in both legs.  This ongoing issue for 8 weeks.  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  She feels she may have bent forward and twisted the wrong way causing increased pain and weakness in the legs.    Pain is in the left lower with radiation into the left groin and to the knee.     Moderate to severe over the past month.  8 out of 10.  Nearly constant.  All day.  Shooting, sharp, throbbing nature.  There is subjective weakness of the lower extremities.    No change with sitting, walking, standing.  Increased with lying down.    Past medical history notable for opioid dependence, on Suboxone.  Also has history of depression, thyroid disease.    Reports moderate leaf with PT, osteopathic manipulation, chiropractic manipulation.  She reports excellent relief with  injections in the past.  Next  She is a chronic smoker, 1 pack/day for 20 years.  No marijuana.  Not on blood thinners.  Not allergic to contrast dye.    Currently using aspirin, ibuprofen, Suboxone, gabapentin.    Review of Systems:    Review of Systems   Constitutional:  Positive for unexpected weight change.   Cardiovascular:  Positive for leg swelling.   Endocrine: Positive for polyuria.   Musculoskeletal:  Positive for arthralgias, joint swelling and myalgias.   Psychiatric/Behavioral:          Depression           Past Medical History:   Diagnosis Date    Abnormal ECG     Abnormal Pap smear of cervix     Denial     Heart aneurysm     Heart murmur     Hypertension     Hypothyroidism     Infectious viral hepatitis     hep c    Obesity (BMI 30-39.9) 2023    Overweight 2019    PAD (peripheral artery disease) (HCC)        Past Surgical History:   Procedure Laterality Date    AUGMENTATION MAMMAPLASTY       SECTION      COLONOSCOPY  2021    ENDOSCOPY OF POUCH  2021       Family History   Problem Relation Age of Onset    Other Mother         Denial    Vaginal cancer Mother 78    Coronary artery disease Father     Other Father         Denial    Heart disease Brother     Celiac disease Son     Celiac disease Son     Breast cancer Maternal Aunt        Social History     Occupational History    Not on file   Tobacco Use    Smoking status: Every Day     Current packs/day: 1.00     Average packs/day: 1 pack/day for 20.0 years (20.0 ttl pk-yrs)     Types: Cigarettes    Smokeless tobacco: Never   Vaping Use    Vaping status: Former    Substances: Nicotine   Substance and Sexual Activity    Alcohol use: Yes     Alcohol/week: 2.0 standard drinks of alcohol     Types: 1 Glasses of wine, 1 Cans of beer per week    Drug use: No    Sexual activity: Not Currently     Partners: Male         Current Outpatient Medications:     albuterol (PROVENTIL HFA,VENTOLIN HFA) 90 mcg/act inhaler, Inhale 1 puff every 6  (six) hours as needed for wheezing, Disp: 18 g, Rfl: 3    aspirin 81 MG tablet, Take 1 tablet by mouth daily, Disp: , Rfl:     atorvastatin (LIPITOR) 20 mg tablet, TAKE 1 TABLET BY MOUTH EVERY DAY, Disp: 90 tablet, Rfl: 2    cyclobenzaprine (FLEXERIL) 10 mg tablet, Take 1 tablet (10 mg total) by mouth 3 (three) times a day, Disp: 30 tablet, Rfl: 2    DULoxetine (CYMBALTA) 60 mg delayed release capsule, TAKE 1 CAPSULE BY MOUTH EVERY DAY, Disp: 90 capsule, Rfl: 4    gabapentin (NEURONTIN) 300 mg capsule, Take 2 capsules (600 mg total) by mouth 2 (two) times a day, Disp: 360 capsule, Rfl: 2    Klor-Con M20 20 MEQ tablet, TAKE 1 TABLET BY MOUTH EVERY DAY, Disp: 90 tablet, Rfl: 2    levothyroxine 175 mcg tablet, TAKE 1 TABLET BY MOUTH EVERY DAY, Disp: 90 tablet, Rfl: 1    lisinopril (ZESTRIL) 10 mg tablet, TAKE 1 TABLET BY MOUTH EVERY DAY, Disp: 90 tablet, Rfl: 1    meloxicam (MOBIC) 15 mg tablet, Take 1 tablet (15 mg total) by mouth daily as needed for moderate pain, Disp: 30 tablet, Rfl: 0    pantoprazole (PROTONIX) 40 mg tablet, TAKE 1 TABLET BY MOUTH EVERY DAY, Disp: 90 tablet, Rfl: 1    SUBOXONE 8-2 MG, USE 2 & 1/2 FILMS UNDER TONGUE DAILY, Disp: , Rfl: 0    torsemide (DEMADEX) 20 mg tablet, TAKE 1 TABLET BY MOUTH EVERY DAY, Disp: 90 tablet, Rfl: 2    ammonium lactate (LAC-HYDRIN) 12 % lotion, APPLY TOPICALLY 2 TIMES A DAY AS NEEDED FOR DRY SKIN. (Patient not taking: Reported on 1/8/2024), Disp: 400 mL, Rfl: 0    fluticasone (FLONASE) 50 mcg/act nasal spray, 1 spray into each nostril 2 (two) times a day (Patient not taking: Reported on 1/8/2024), Disp: 11.1 mL, Rfl: 0    hydrocortisone 1 % cream, Apply topically 4 (four) times a day as needed for rash (Patient not taking: Reported on 1/8/2024), Disp: 30 g, Rfl: 0    pseudoephedrine (SUDAFED) 120 MG 12 hr tablet, Take 1 tablet (120 mg total) by mouth 2 (two) times a day (Patient not taking: Reported on 12/7/2023), Disp: 10 tablet, Rfl: 0    pseudoephedrine (SUDAFED)  "30 mg tablet, Take 60 mg by mouth every 4 (four) hours as needed for congestion (Patient not taking: Reported on 12/7/2023), Disp: , Rfl:     Semaglutide-Weight Management (Wegovy) 0.25 MG/0.5ML, , Disp: , Rfl:     Allergies   Allergen Reactions    Penicillins Itching    Motrin [Ibuprofen] Blisters       Physical Exam:    /82   Pulse 76   Ht 5' 5\" (1.651 m)   Wt 86.2 kg (190 lb)   BMI 31.62 kg/m²     Constitutional: normal, well developed, well nourished, alert, in no distress and non-toxic and no overt pain behavior.  Eyes: anicteric  HEENT: grossly intact  Neck: supple, symmetric, trachea midline and no masses   Pulmonary:even and unlabored  Cardiovascular:No edema or pitting edema present  Skin:Normal without rashes or lesions and well hydrated  Psychiatric:Mood and affect appropriate  Neurologic:Cranial Nerves II-XII grossly intact  Musculoskeletal: PAIN IN left groin with log rolling of LLE stinchfield positive    Lumbar Spine Exam    Appearance:  Normal lordosis  Palpation/Tenderness:  TTP left SI joint and bilateral lumbar paraspinal region  Sensory:  no sensory deficits noted  Range of Motion:  Limited flexion and extension secondary to pain  Motor Strength:  Left hip flexion:  5/5  Left hip extension:  5/5  Right hip flexion:  5/5  Right hip extension:  5/5  Left knee flexion:  5/5  Left knee extension:  5/5  Right knee flexion:  5/5  Right knee extension:  5/5  Left foot dorsiflexion:  5/5  Left foot plantar flexion:  5/5  Right foot dorsiflexion:  5/5  Right foot plantar flexion:  5/5  Reflexes:  Left Patellar:  2+   Right Patellar:  2+   Left Achilles:  2+   Right Achilles:  2+   Special Tests:  Left Straight Leg Test:  negative  Right Straight Leg Test:  negative  Positive Odalis test on left.  Positive Gaenslen's, thigh thrust.  There is significant pain with logrolling      Imaging    MRI LUMBAR SPINE WITHOUT CONTRAST  11/30/23     INDICATION: Left leg weakness and urinary incontinence.   "   COMPARISON: 11/30/2023.     TECHNIQUE:  Multiplanar, multisequence imaging of the lumbar spine was performed. .        IMAGE QUALITY:  Diagnostic     FINDINGS:     VERTEBRAL BODIES: Mild L4 anterolisthesis of 1 to 2 mm likely secondary to chronic disc and facet degenerative change, stable     SACRUM: Unremarkable, limited evaluation.     DISTAL CORD AND CONUS: Normal signal morphology of the distal thoracic cord and conus, terminating at L1.     PARASPINAL SOFT TISSUES: No mass or fluid collection.     LOWER THORACIC DISC SPACES: Mild disc degenerative change. No central canal stenosis or neural foraminal narrowing.     LUMBAR DISC SPACES:     L1-L2: No central canal stenosis or neuroforaminal narrowing.     L2-L3: Minimal posterior disc bulge and facet hypertrophy. No central canal stenosis or neural foraminal narrowing.     L3-L4: Minimal posterior disc osteophytes. Facet osteophytosis and ligamentum flavum hypertrophy. No central canal stenosis. Mild neural foraminal narrowing.     L4-L5: Mild uncovering of the disc posteriorly. Significant facet osteophytosis. Mild central canal stenosis and neural foraminal narrowing.     L5-S1: Posterior disc osteophytes and disc bulge. Facet osteophytosis. Mild central canal stenosis and neural foraminal narrowing.     OTHER FINDINGS:  None.     IMPRESSION:     Mild disc and facet degenerative change throughout the lumbar spine. No evidence of nerve root encroachment.      LUMBAR SPINE  11/30/23     INDICATION:   Low back pain, unspecified.      COMPARISON:  Comparison made with previous examination(s) dated (MR) 11-Aug-2023,(SR) 01-Mar-2023,(CT) 01-Mar-2023,(NM) 22-Mar-2021.     VIEWS:  XR SPINE LUMBAR MINIMUM 4 VIEWS NON INJURY  Images: 4     FINDINGS:     There are 5 non rib bearing lumbar vertebral bodies.      There is no evidence of acute fracture or destructive osseous lesion.     There is mild anterolisthesis of L4 on L5 and of L5 on S1     There is severe facet  hypertrophy and sclerosis in the mid to lower lumbar spine.     There is moderate multilevel disc space narrowing despite this worse at L5-S1     The pedicles appear intact.     Soft tissues are unremarkable.     IMPRESSION:        Severe facet disease lower lumbar spine. Moderate spondylosis worse at L5-S1  Mild anterolisthesis L4 on L5 and L5 on S1     XR hip/pelv 2-3 vws left if performed    (Results Pending)   [place] spine and pain procedure    (Results Pending)       Orders Placed This Encounter   Procedures    XR hip/pelv 2-3 vws left if performed    [place] spine and pain procedure

## 2024-01-08 NOTE — H&P (VIEW-ONLY)
Assessment:  1. Lumbar spondylosis    2. Sacroiliitis (HCC)    3. Acute exacerbation of chronic low back pain    4. Left groin pain        Plan:  Orders Placed This Encounter   Procedures    XR hip/pelv 2-3 vws left if performed     Standing Status:   Future     Standing Expiration Date:   1/8/2028     Scheduling Instructions:      Bring along any outside films relating to this procedure.          [place] spine and pain procedure     Standing Status:   Future     Standing Expiration Date:   1/8/2028     Order Specific Question:   Reason for Exam:     Answer:   left SI joint injection     Order Specific Question:   Anticoagulant hold needed?     Answer:   no       New Medications Ordered This Visit   Medications    meloxicam (MOBIC) 15 mg tablet     Sig: Take 1 tablet (15 mg total) by mouth daily as needed for moderate pain     Dispense:  30 tablet     Refill:  0       My impressions and treatment recommendations were discussed in detail with the patient, who verbalized understanding and had no further questions.    This is a 63-year-old female presents her office with acute exacerbation of low back pain.  She has severe left-sided low back pain with radiation into the left hip and left groin.  Does not appear radicular in nature appears mostly musculoskeletal.  She has notable facet disease on imaging which is likely aggravated.  She also has notable tenderness palpation over the left SI joint with pain with provocative maneuvers notable low.  Discussed left SI joint injection with fluoroscopic guidance help with her symptoms.  Additionally, however, she has pain in the left groin which is exacerbated with flexion of the left hip and provocative maneuvers notable low.  She may have underlying intra-articular hip pathology and I will order x-ray of the left hip to further assess.  If there is notable degenerative disease, would recommend hip injection.    In the meantime give her a short course of meloxicam 15 mg daily as  needed.  I did discuss with the patient that she is taking blood pressure medications and continues NSAID use is not recommended.      [place] Prescription Drug Monitoring Program report was reviewed and was appropriate     Complete risks and benefits including bleeding, infection, tissue reaction, nerve injury and allergic reaction were discussed. The approach was demonstrated using models and literature was provided. Verbal and written consent was obtained.     Discharge instructions were provided. I personally saw and examined the patient and I agree with the above discussed plan of care.    History of Present Illness:    Delaney Garza is a 63 y.o. female who presents to [facility] for initial evaluation of the above stated pain complaints. The patient has a past medical and chronic pain history as outlined in the assessment section. She was referred by Oj Corrales DO  [address] [facility] [place] [address] .    Patient is here with chief complaint of left-sided back pain radiating into the groin and into the left leg.  Also with notable swelling in both legs.  This ongoing issue for 8 weeks.  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  She feels she may have bent forward and twisted the wrong way causing increased pain and weakness in the legs.    Pain is in the left lower with radiation into the left groin and to the knee.     Moderate to severe over the past month.  8 out of 10.  Nearly constant.  All day.  Shooting, sharp, throbbing nature.  There is subjective weakness of the lower extremities.    No change with sitting, walking, standing.  Increased with lying down.    Past medical history notable for opioid dependence, on Suboxone.  Also has history of depression, thyroid disease.    Reports moderate leaf with PT, osteopathic manipulation, chiropractic manipulation.  She reports excellent relief with  injections in the past.  Next  She is a chronic smoker, 1 pack/day for 20 years.  No marijuana.  Not on blood thinners.  Not allergic to contrast dye.    Currently using aspirin, ibuprofen, Suboxone, gabapentin.    Review of Systems:    Review of Systems   Constitutional:  Positive for unexpected weight change.   Cardiovascular:  Positive for leg swelling.   Endocrine: Positive for polyuria.   Musculoskeletal:  Positive for arthralgias, joint swelling and myalgias.   Psychiatric/Behavioral:          Depression           Past Medical History:   Diagnosis Date    Abnormal ECG     Abnormal Pap smear of cervix     Denial     Heart aneurysm     Heart murmur     Hypertension     Hypothyroidism     Infectious viral hepatitis     hep c    Obesity (BMI 30-39.9) 2023    Overweight 2019    PAD (peripheral artery disease) (HCC)        Past Surgical History:   Procedure Laterality Date    AUGMENTATION MAMMAPLASTY       SECTION      COLONOSCOPY  2021    ENDOSCOPY OF POUCH  2021       Family History   Problem Relation Age of Onset    Other Mother         Denial    Vaginal cancer Mother 78    Coronary artery disease Father     Other Father         Denial    Heart disease Brother     Celiac disease Son     Celiac disease Son     Breast cancer Maternal Aunt        Social History     Occupational History    Not on file   Tobacco Use    Smoking status: Every Day     Current packs/day: 1.00     Average packs/day: 1 pack/day for 20.0 years (20.0 ttl pk-yrs)     Types: Cigarettes    Smokeless tobacco: Never   Vaping Use    Vaping status: Former    Substances: Nicotine   Substance and Sexual Activity    Alcohol use: Yes     Alcohol/week: 2.0 standard drinks of alcohol     Types: 1 Glasses of wine, 1 Cans of beer per week    Drug use: No    Sexual activity: Not Currently     Partners: Male         Current Outpatient Medications:     albuterol (PROVENTIL HFA,VENTOLIN HFA) 90 mcg/act inhaler, Inhale 1 puff every 6  (six) hours as needed for wheezing, Disp: 18 g, Rfl: 3    aspirin 81 MG tablet, Take 1 tablet by mouth daily, Disp: , Rfl:     atorvastatin (LIPITOR) 20 mg tablet, TAKE 1 TABLET BY MOUTH EVERY DAY, Disp: 90 tablet, Rfl: 2    cyclobenzaprine (FLEXERIL) 10 mg tablet, Take 1 tablet (10 mg total) by mouth 3 (three) times a day, Disp: 30 tablet, Rfl: 2    DULoxetine (CYMBALTA) 60 mg delayed release capsule, TAKE 1 CAPSULE BY MOUTH EVERY DAY, Disp: 90 capsule, Rfl: 4    gabapentin (NEURONTIN) 300 mg capsule, Take 2 capsules (600 mg total) by mouth 2 (two) times a day, Disp: 360 capsule, Rfl: 2    Klor-Con M20 20 MEQ tablet, TAKE 1 TABLET BY MOUTH EVERY DAY, Disp: 90 tablet, Rfl: 2    levothyroxine 175 mcg tablet, TAKE 1 TABLET BY MOUTH EVERY DAY, Disp: 90 tablet, Rfl: 1    lisinopril (ZESTRIL) 10 mg tablet, TAKE 1 TABLET BY MOUTH EVERY DAY, Disp: 90 tablet, Rfl: 1    meloxicam (MOBIC) 15 mg tablet, Take 1 tablet (15 mg total) by mouth daily as needed for moderate pain, Disp: 30 tablet, Rfl: 0    pantoprazole (PROTONIX) 40 mg tablet, TAKE 1 TABLET BY MOUTH EVERY DAY, Disp: 90 tablet, Rfl: 1    SUBOXONE 8-2 MG, USE 2 & 1/2 FILMS UNDER TONGUE DAILY, Disp: , Rfl: 0    torsemide (DEMADEX) 20 mg tablet, TAKE 1 TABLET BY MOUTH EVERY DAY, Disp: 90 tablet, Rfl: 2    ammonium lactate (LAC-HYDRIN) 12 % lotion, APPLY TOPICALLY 2 TIMES A DAY AS NEEDED FOR DRY SKIN. (Patient not taking: Reported on 1/8/2024), Disp: 400 mL, Rfl: 0    fluticasone (FLONASE) 50 mcg/act nasal spray, 1 spray into each nostril 2 (two) times a day (Patient not taking: Reported on 1/8/2024), Disp: 11.1 mL, Rfl: 0    hydrocortisone 1 % cream, Apply topically 4 (four) times a day as needed for rash (Patient not taking: Reported on 1/8/2024), Disp: 30 g, Rfl: 0    pseudoephedrine (SUDAFED) 120 MG 12 hr tablet, Take 1 tablet (120 mg total) by mouth 2 (two) times a day (Patient not taking: Reported on 12/7/2023), Disp: 10 tablet, Rfl: 0    pseudoephedrine (SUDAFED)  "30 mg tablet, Take 60 mg by mouth every 4 (four) hours as needed for congestion (Patient not taking: Reported on 12/7/2023), Disp: , Rfl:     Semaglutide-Weight Management (Wegovy) 0.25 MG/0.5ML, , Disp: , Rfl:     Allergies   Allergen Reactions    Penicillins Itching    Motrin [Ibuprofen] Blisters       Physical Exam:    /82   Pulse 76   Ht 5' 5\" (1.651 m)   Wt 86.2 kg (190 lb)   BMI 31.62 kg/m²     Constitutional: normal, well developed, well nourished, alert, in no distress and non-toxic and no overt pain behavior.  Eyes: anicteric  HEENT: grossly intact  Neck: supple, symmetric, trachea midline and no masses   Pulmonary:even and unlabored  Cardiovascular:No edema or pitting edema present  Skin:Normal without rashes or lesions and well hydrated  Psychiatric:Mood and affect appropriate  Neurologic:Cranial Nerves II-XII grossly intact  Musculoskeletal: PAIN IN left groin with log rolling of LLE stinchfield positive    Lumbar Spine Exam    Appearance:  Normal lordosis  Palpation/Tenderness:  TTP left SI joint and bilateral lumbar paraspinal region  Sensory:  no sensory deficits noted  Range of Motion:  Limited flexion and extension secondary to pain  Motor Strength:  Left hip flexion:  5/5  Left hip extension:  5/5  Right hip flexion:  5/5  Right hip extension:  5/5  Left knee flexion:  5/5  Left knee extension:  5/5  Right knee flexion:  5/5  Right knee extension:  5/5  Left foot dorsiflexion:  5/5  Left foot plantar flexion:  5/5  Right foot dorsiflexion:  5/5  Right foot plantar flexion:  5/5  Reflexes:  Left Patellar:  2+   Right Patellar:  2+   Left Achilles:  2+   Right Achilles:  2+   Special Tests:  Left Straight Leg Test:  negative  Right Straight Leg Test:  negative  Positive Odalis test on left.  Positive Gaenslen's, thigh thrust.  There is significant pain with logrolling      Imaging    MRI LUMBAR SPINE WITHOUT CONTRAST  11/30/23     INDICATION: Left leg weakness and urinary incontinence.   "   COMPARISON: 11/30/2023.     TECHNIQUE:  Multiplanar, multisequence imaging of the lumbar spine was performed. .        IMAGE QUALITY:  Diagnostic     FINDINGS:     VERTEBRAL BODIES: Mild L4 anterolisthesis of 1 to 2 mm likely secondary to chronic disc and facet degenerative change, stable     SACRUM: Unremarkable, limited evaluation.     DISTAL CORD AND CONUS: Normal signal morphology of the distal thoracic cord and conus, terminating at L1.     PARASPINAL SOFT TISSUES: No mass or fluid collection.     LOWER THORACIC DISC SPACES: Mild disc degenerative change. No central canal stenosis or neural foraminal narrowing.     LUMBAR DISC SPACES:     L1-L2: No central canal stenosis or neuroforaminal narrowing.     L2-L3: Minimal posterior disc bulge and facet hypertrophy. No central canal stenosis or neural foraminal narrowing.     L3-L4: Minimal posterior disc osteophytes. Facet osteophytosis and ligamentum flavum hypertrophy. No central canal stenosis. Mild neural foraminal narrowing.     L4-L5: Mild uncovering of the disc posteriorly. Significant facet osteophytosis. Mild central canal stenosis and neural foraminal narrowing.     L5-S1: Posterior disc osteophytes and disc bulge. Facet osteophytosis. Mild central canal stenosis and neural foraminal narrowing.     OTHER FINDINGS:  None.     IMPRESSION:     Mild disc and facet degenerative change throughout the lumbar spine. No evidence of nerve root encroachment.      LUMBAR SPINE  11/30/23     INDICATION:   Low back pain, unspecified.      COMPARISON:  Comparison made with previous examination(s) dated (MR) 11-Aug-2023,(SR) 01-Mar-2023,(CT) 01-Mar-2023,(NM) 22-Mar-2021.     VIEWS:  XR SPINE LUMBAR MINIMUM 4 VIEWS NON INJURY  Images: 4     FINDINGS:     There are 5 non rib bearing lumbar vertebral bodies.      There is no evidence of acute fracture or destructive osseous lesion.     There is mild anterolisthesis of L4 on L5 and of L5 on S1     There is severe facet  hypertrophy and sclerosis in the mid to lower lumbar spine.     There is moderate multilevel disc space narrowing despite this worse at L5-S1     The pedicles appear intact.     Soft tissues are unremarkable.     IMPRESSION:        Severe facet disease lower lumbar spine. Moderate spondylosis worse at L5-S1  Mild anterolisthesis L4 on L5 and L5 on S1     XR hip/pelv 2-3 vws left if performed    (Results Pending)   [place] spine and pain procedure    (Results Pending)       Orders Placed This Encounter   Procedures    XR hip/pelv 2-3 vws left if performed    [place] spine and pain procedure

## 2024-01-08 NOTE — PATIENT INSTRUCTIONS
Sacroiliitis   WHAT YOU NEED TO KNOW:   Sacroiliitis is a painful swelling of one or both of your sacroiliac joints that lasts at least 3 months. The sacroiliac joint connects your pelvis to the base of your spine.  DISCHARGE INSTRUCTIONS:   Medicines:  Ask for more information about these and other medicines you may need to treat sacroiliitis:  Pain medicine:  You may be given medicine to take away or decrease pain. Do not wait until the pain is severe before you take your medicine. You may be given the medicine as a pill to swallow or as a lotion that you put on the painful area.    NSAIDs  help decrease swelling and pain or fever. This medicine is available with or without a doctor's order. NSAIDs can cause stomach bleeding or kidney problems in certain people. If you take blood thinner medicine, always ask your healthcare provider if NSAIDs are safe for you. Always read the medicine label and follow directions.    Muscle relaxers  help decrease pain and muscle spasms.    Take your medicine as directed.  Contact your healthcare provider if you think your medicine is not helping or if you have side effects. Tell your provider if you are allergic to any medicine. Keep a list of the medicines, vitamins, and herbs you take. Include the amounts, and when and why you take them. Bring the list or the pill bottles to follow-up visits. Carry your medicine list with you in case of an emergency.    Physical therapy:  Your healthcare provider may suggest physical therapy. Your physical therapist may teach you exercises to improve posture (the way you stand and sit), flexibility, and strength in your lower back. The therapist may also teach you how to remain safely active and prevent more injury. Follow the exercise plan given to you by your physical therapist.  Rest:  Follow your healthcare provider's instructions about how much rest you should get. Avoid activity that worsens your pain.  Ice or heat packs:  Use ice or heat  packs on the sore area of your body to decrease the pain and swelling. Put ice in a plastic bag covered with a towel on your low back. Cover heated items with a towel to avoid burns. Use ice and heat as directed.  Follow up with your healthcare provider or spine specialist within 1 to 2 weeks:  Write down your questions so you remember to ask them during your visits.  Contact your healthcare provider or spine specialist if:   Your pain makes it hard for you to do your daily activities, such as work or school.    Your pain does not go away after treatment.    You feel depressed or anxious.    You have questions about your condition or care.    Return to the emergency department if:   You have a fever.    Your pain is worse than before.    Your pain prevents you from sleeping.    © Copyright [employer] 2023 Information is for End User's use only and may not be sold, redistributed or otherwise used for commercial purposes.  The above information is an  only. It is not intended as medical advice for individual conditions or treatments. Talk to your doctor, nurse or pharmacist before following any medical regimen to see if it is safe and effective for you.

## 2024-01-08 NOTE — H&P (VIEW-ONLY)
Assessment:  1. Lumbar spondylosis    2. Sacroiliitis (HCC)    3. Acute exacerbation of chronic low back pain    4. Left groin pain        Plan:  Orders Placed This Encounter   Procedures    XR hip/pelv 2-3 vws left if performed     Standing Status:   Future     Standing Expiration Date:   1/8/2028     Scheduling Instructions:      Bring along any outside films relating to this procedure.          [place] spine and pain procedure     Standing Status:   Future     Standing Expiration Date:   1/8/2028     Order Specific Question:   Reason for Exam:     Answer:   left SI joint injection     Order Specific Question:   Anticoagulant hold needed?     Answer:   no       New Medications Ordered This Visit   Medications    meloxicam (MOBIC) 15 mg tablet     Sig: Take 1 tablet (15 mg total) by mouth daily as needed for moderate pain     Dispense:  30 tablet     Refill:  0       My impressions and treatment recommendations were discussed in detail with the patient, who verbalized understanding and had no further questions.    This is a 63-year-old female presents her office with acute exacerbation of low back pain.  She has severe left-sided low back pain with radiation into the left hip and left groin.  Does not appear radicular in nature appears mostly musculoskeletal.  She has notable facet disease on imaging which is likely aggravated.  She also has notable tenderness palpation over the left SI joint with pain with provocative maneuvers notable low.  Discussed left SI joint injection with fluoroscopic guidance help with her symptoms.  Additionally, however, she has pain in the left groin which is exacerbated with flexion of the left hip and provocative maneuvers notable low.  She may have underlying intra-articular hip pathology and I will order x-ray of the left hip to further assess.  If there is notable degenerative disease, would recommend hip injection.    In the meantime give her a short course of meloxicam 15 mg daily as  needed.  I did discuss with the patient that she is taking blood pressure medications and continues NSAID use is not recommended.      [place] Prescription Drug Monitoring Program report was reviewed and was appropriate     Complete risks and benefits including bleeding, infection, tissue reaction, nerve injury and allergic reaction were discussed. The approach was demonstrated using models and literature was provided. Verbal and written consent was obtained.     Discharge instructions were provided. I personally saw and examined the patient and I agree with the above discussed plan of care.    History of Present Illness:    Delaney Garza is a 63 y.o. female who presents to [facility] for initial evaluation of the above stated pain complaints. The patient has a past medical and chronic pain history as outlined in the assessment section. She was referred by Oj Corrales DO  [address] [place] [address] .    Patient is here with chief complaint of left-sided back pain radiating into the groin and into the left leg.  Also with notable swelling in both legs.  This ongoing issue for 8 weeks.  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  She feels she may have bent forward and twisted the wrong way causing increased pain and weakness in the legs.    Pain is in the left lower with radiation into the left groin and to the knee.     Moderate to severe over the past month.  8 out of 10.  Nearly constant.  All day.  Shooting, sharp, throbbing nature.  There is subjective weakness of the lower extremities.    No change with sitting, walking, standing.  Increased with lying down.    Past medical history notable for opioid dependence, on Suboxone.  Also has history of depression, thyroid disease.    Reports moderate leaf with PT, osteopathic manipulation, chiropractic manipulation.  She reports excellent relief with  injections in the past.  Next  She is a chronic smoker, 1 pack/day for 20 years.  No marijuana.  Not on blood thinners.  Not allergic to contrast dye.    Currently using aspirin, ibuprofen, Suboxone, gabapentin.    Review of Systems:    Review of Systems   Constitutional:  Positive for unexpected weight change.   Cardiovascular:  Positive for leg swelling.   Endocrine: Positive for polyuria.   Musculoskeletal:  Positive for arthralgias, joint swelling and myalgias.   Psychiatric/Behavioral:          Depression           Past Medical History:   Diagnosis Date    Abnormal ECG     Abnormal Pap smear of cervix     Denial     Heart aneurysm     Heart murmur     Hypertension     Hypothyroidism     Infectious viral hepatitis     hep c    Obesity (BMI 30-39.9) 2023    Overweight 2019    PAD (peripheral artery disease) (HCC)        Past Surgical History:   Procedure Laterality Date    AUGMENTATION MAMMAPLASTY       SECTION      COLONOSCOPY  2021    ENDOSCOPY OF POUCH  2021       Family History   Problem Relation Age of Onset    Other Mother         Denial    Vaginal cancer Mother 78    Coronary artery disease Father     Other Father         Denial    Heart disease Brother     Celiac disease Son     Celiac disease Son     Breast cancer Maternal Aunt        Social History     Occupational History    Not on file   Tobacco Use    Smoking status: Every Day     Current packs/day: 1.00     Average packs/day: 1 pack/day for 20.0 years (20.0 ttl pk-yrs)     Types: Cigarettes    Smokeless tobacco: Never   Vaping Use    Vaping status: Former    Substances: Nicotine   Substance and Sexual Activity    Alcohol use: Yes     Alcohol/week: 2.0 standard drinks of alcohol     Types: 1 Glasses of wine, 1 Cans of beer per week    Drug use: No    Sexual activity: Not Currently     Partners: Male         Current Outpatient Medications:     albuterol (PROVENTIL HFA,VENTOLIN HFA) 90 mcg/act inhaler, Inhale 1 puff every 6  (six) hours as needed for wheezing, Disp: 18 g, Rfl: 3    aspirin 81 MG tablet, Take 1 tablet by mouth daily, Disp: , Rfl:     atorvastatin (LIPITOR) 20 mg tablet, TAKE 1 TABLET BY MOUTH EVERY DAY, Disp: 90 tablet, Rfl: 2    cyclobenzaprine (FLEXERIL) 10 mg tablet, Take 1 tablet (10 mg total) by mouth 3 (three) times a day, Disp: 30 tablet, Rfl: 2    DULoxetine (CYMBALTA) 60 mg delayed release capsule, TAKE 1 CAPSULE BY MOUTH EVERY DAY, Disp: 90 capsule, Rfl: 4    gabapentin (NEURONTIN) 300 mg capsule, Take 2 capsules (600 mg total) by mouth 2 (two) times a day, Disp: 360 capsule, Rfl: 2    Klor-Con M20 20 MEQ tablet, TAKE 1 TABLET BY MOUTH EVERY DAY, Disp: 90 tablet, Rfl: 2    levothyroxine 175 mcg tablet, TAKE 1 TABLET BY MOUTH EVERY DAY, Disp: 90 tablet, Rfl: 1    lisinopril (ZESTRIL) 10 mg tablet, TAKE 1 TABLET BY MOUTH EVERY DAY, Disp: 90 tablet, Rfl: 1    meloxicam (MOBIC) 15 mg tablet, Take 1 tablet (15 mg total) by mouth daily as needed for moderate pain, Disp: 30 tablet, Rfl: 0    pantoprazole (PROTONIX) 40 mg tablet, TAKE 1 TABLET BY MOUTH EVERY DAY, Disp: 90 tablet, Rfl: 1    SUBOXONE 8-2 MG, USE 2 & 1/2 FILMS UNDER TONGUE DAILY, Disp: , Rfl: 0    torsemide (DEMADEX) 20 mg tablet, TAKE 1 TABLET BY MOUTH EVERY DAY, Disp: 90 tablet, Rfl: 2    ammonium lactate (LAC-HYDRIN) 12 % lotion, APPLY TOPICALLY 2 TIMES A DAY AS NEEDED FOR DRY SKIN. (Patient not taking: Reported on 1/8/2024), Disp: 400 mL, Rfl: 0    fluticasone (FLONASE) 50 mcg/act nasal spray, 1 spray into each nostril 2 (two) times a day (Patient not taking: Reported on 1/8/2024), Disp: 11.1 mL, Rfl: 0    hydrocortisone 1 % cream, Apply topically 4 (four) times a day as needed for rash (Patient not taking: Reported on 1/8/2024), Disp: 30 g, Rfl: 0    pseudoephedrine (SUDAFED) 120 MG 12 hr tablet, Take 1 tablet (120 mg total) by mouth 2 (two) times a day (Patient not taking: Reported on 12/7/2023), Disp: 10 tablet, Rfl: 0    pseudoephedrine (SUDAFED)  "30 mg tablet, Take 60 mg by mouth every 4 (four) hours as needed for congestion (Patient not taking: Reported on 12/7/2023), Disp: , Rfl:     Semaglutide-Weight Management (Wegovy) 0.25 MG/0.5ML, , Disp: , Rfl:     Allergies   Allergen Reactions    Penicillins Itching    Motrin [Ibuprofen] Blisters       Physical Exam:    /82   Pulse 76   Ht 5' 5\" (1.651 m)   Wt 86.2 kg (190 lb)   BMI 31.62 kg/m²     Constitutional: normal, well developed, well nourished, alert, in no distress and non-toxic and no overt pain behavior.  Eyes: anicteric  HEENT: grossly intact  Neck: supple, symmetric, trachea midline and no masses   Pulmonary:even and unlabored  Cardiovascular:No edema or pitting edema present  Skin:Normal without rashes or lesions and well hydrated  Psychiatric:Mood and affect appropriate  Neurologic:Cranial Nerves II-XII grossly intact  Musculoskeletal: PAIN IN left groin with log rolling of LLE stinchfield positive    Lumbar Spine Exam    Appearance:  Normal lordosis  Palpation/Tenderness:  TTP left SI joint and bilateral lumbar paraspinal region  Sensory:  no sensory deficits noted  Range of Motion:  Limited flexion and extension secondary to pain  Motor Strength:  Left hip flexion:  5/5  Left hip extension:  5/5  Right hip flexion:  5/5  Right hip extension:  5/5  Left knee flexion:  5/5  Left knee extension:  5/5  Right knee flexion:  5/5  Right knee extension:  5/5  Left foot dorsiflexion:  5/5  Left foot plantar flexion:  5/5  Right foot dorsiflexion:  5/5  Right foot plantar flexion:  5/5  Reflexes:  Left Patellar:  2+   Right Patellar:  2+   Left Achilles:  2+   Right Achilles:  2+   Special Tests:  Left Straight Leg Test:  negative  Right Straight Leg Test:  negative  Positive Odalis test on left.  Positive Gaenslen's, thigh thrust.  There is significant pain with logrolling      Imaging    MRI LUMBAR SPINE WITHOUT CONTRAST  11/30/23     INDICATION: Left leg weakness and urinary incontinence.   "   COMPARISON: 11/30/2023.     TECHNIQUE:  Multiplanar, multisequence imaging of the lumbar spine was performed. .        IMAGE QUALITY:  Diagnostic     FINDINGS:     VERTEBRAL BODIES: Mild L4 anterolisthesis of 1 to 2 mm likely secondary to chronic disc and facet degenerative change, stable     SACRUM: Unremarkable, limited evaluation.     DISTAL CORD AND CONUS: Normal signal morphology of the distal thoracic cord and conus, terminating at L1.     PARASPINAL SOFT TISSUES: No mass or fluid collection.     LOWER THORACIC DISC SPACES: Mild disc degenerative change. No central canal stenosis or neural foraminal narrowing.     LUMBAR DISC SPACES:     L1-L2: No central canal stenosis or neuroforaminal narrowing.     L2-L3: Minimal posterior disc bulge and facet hypertrophy. No central canal stenosis or neural foraminal narrowing.     L3-L4: Minimal posterior disc osteophytes. Facet osteophytosis and ligamentum flavum hypertrophy. No central canal stenosis. Mild neural foraminal narrowing.     L4-L5: Mild uncovering of the disc posteriorly. Significant facet osteophytosis. Mild central canal stenosis and neural foraminal narrowing.     L5-S1: Posterior disc osteophytes and disc bulge. Facet osteophytosis. Mild central canal stenosis and neural foraminal narrowing.     OTHER FINDINGS:  None.     IMPRESSION:     Mild disc and facet degenerative change throughout the lumbar spine. No evidence of nerve root encroachment.      LUMBAR SPINE  11/30/23     INDICATION:   Low back pain, unspecified.      COMPARISON:  Comparison made with previous examination(s) dated (MR) 11-Aug-2023,(SR) 01-Mar-2023,(CT) 01-Mar-2023,(NM) 22-Mar-2021.     VIEWS:  XR SPINE LUMBAR MINIMUM 4 VIEWS NON INJURY  Images: 4     FINDINGS:     There are 5 non rib bearing lumbar vertebral bodies.      There is no evidence of acute fracture or destructive osseous lesion.     There is mild anterolisthesis of L4 on L5 and of L5 on S1     There is severe facet  hypertrophy and sclerosis in the mid to lower lumbar spine.     There is moderate multilevel disc space narrowing despite this worse at L5-S1     The pedicles appear intact.     Soft tissues are unremarkable.     IMPRESSION:        Severe facet disease lower lumbar spine. Moderate spondylosis worse at L5-S1  Mild anterolisthesis L4 on L5 and L5 on S1     XR hip/pelv 2-3 vws left if performed    (Results Pending)   [place] spine and pain procedure    (Results Pending)       Orders Placed This Encounter   Procedures    XR hip/pelv 2-3 vws left if performed    [place] spine and pain procedure

## 2024-01-09 ENCOUNTER — TELEPHONE (OUTPATIENT)
Dept: RADIOLOGY | Facility: CLINIC | Age: 64
End: 2024-01-09

## 2024-01-09 DIAGNOSIS — M16.12 PRIMARY OSTEOARTHRITIS OF LEFT HIP: Primary | ICD-10-CM

## 2024-01-09 NOTE — TELEPHONE ENCOUNTER
----- Message from Marshall Campos MD sent at 1/9/2024 10:18 AM EST -----  XRAY shows  moderate arthritis in the left hip which probably explains pain in the groin and upper thigh. She was ordered left SIJ yesterday but I am also adding left hip injection as well to be done 2 weeks apart.

## 2024-01-17 ENCOUNTER — TELEPHONE (OUTPATIENT)
Age: 64
End: 2024-01-17

## 2024-01-19 ENCOUNTER — TELEPHONE (OUTPATIENT)
Age: 64
End: 2024-01-19

## 2024-01-19 DIAGNOSIS — R73.9 HYPERGLYCEMIA: Primary | ICD-10-CM

## 2024-01-19 RX ORDER — SEMAGLUTIDE 0.25 MG/.5ML
0.25 INJECTION, SOLUTION SUBCUTANEOUS WEEKLY
Qty: 2 ML | Refills: 0 | Status: SHIPPED | OUTPATIENT
Start: 2024-01-19 | End: 2024-02-16

## 2024-01-19 NOTE — TELEPHONE ENCOUNTER
Pt would like Dr Swift to put in lab orders; if it's time for her to do lab work    Call her when the orders are in

## 2024-01-19 NOTE — TELEPHONE ENCOUNTER
Pt called to have Dr Swift send script to the [facility] for the [place].

## 2024-01-23 ENCOUNTER — HOSPITAL ENCOUNTER (OUTPATIENT)
Dept: RADIOLOGY | Facility: CLINIC | Age: 64
Discharge: HOME | End: 2024-01-23
Admitting: STUDENT IN AN ORGANIZED HEALTH CARE EDUCATION/TRAINING PROGRAM
Payer: COMMERCIAL

## 2024-01-23 VITALS
OXYGEN SATURATION: 95 % | TEMPERATURE: 98.3 F | DIASTOLIC BLOOD PRESSURE: 77 MMHG | RESPIRATION RATE: 20 BRPM | HEART RATE: 69 BPM | SYSTOLIC BLOOD PRESSURE: 134 MMHG

## 2024-01-23 DIAGNOSIS — M46.1 SACROILIITIS (HCC): ICD-10-CM

## 2024-01-23 PROCEDURE — 27096 INJECT SACROILIAC JOINT: CPT | Performed by: STUDENT IN AN ORGANIZED HEALTH CARE EDUCATION/TRAINING PROGRAM

## 2024-01-23 RX ORDER — METHYLPREDNISOLONE ACETATE 40 MG/ML
40 INJECTION, SUSPENSION INTRA-ARTICULAR; INTRALESIONAL; INTRAMUSCULAR; PARENTERAL; SOFT TISSUE ONCE
Status: COMPLETED | OUTPATIENT
Start: 2024-01-23 | End: 2024-01-23

## 2024-01-23 RX ORDER — BUPIVACAINE HCL/PF 2.5 MG/ML
1 VIAL (ML) INJECTION ONCE
Status: COMPLETED | OUTPATIENT
Start: 2024-01-23 | End: 2024-01-23

## 2024-01-23 RX ADMIN — IOHEXOL 0.5 ML: 300 INJECTION, SOLUTION INTRAVENOUS at 13:16

## 2024-01-23 RX ADMIN — Medication 1 ML: at 13:17

## 2024-01-23 RX ADMIN — METHYLPREDNISOLONE ACETATE 40 MG: 40 INJECTION, SUSPENSION INTRA-ARTICULAR; INTRALESIONAL; INTRAMUSCULAR; PARENTERAL; SOFT TISSUE at 13:17

## 2024-01-23 NOTE — INTERVAL H&P NOTE
Update: (This section must be completed if the H&P was completed greater than 24 hrs to procedure or admission)    H&P reviewed. After examining the patient, I find no changed to the H&P since it had been written.    Patient re-evaluated. Accept as history and physical.    Marshall Campos MD/January 23, 2024/1:02 PM

## 2024-01-23 NOTE — INTERVAL H&P NOTE
Update: (This section must be completed if the H&P was completed greater than 24 hrs to procedure or admission)    H&P reviewed. After examining the patient, I find no changed to the H&P since it had been written.    Patient re-evaluated. Accept as history and physical.    Marshall Campos MD/January 23, 2024/1:01 PM

## 2024-01-23 NOTE — DISCHARGE INSTR - LAB

## 2024-01-25 DIAGNOSIS — R21 RASH: ICD-10-CM

## 2024-01-26 RX ORDER — AMMONIUM LACTATE 12 G/100G
LOTION TOPICAL
Qty: 400 ML | Refills: 0 | Status: SHIPPED | OUTPATIENT
Start: 2024-01-26

## 2024-01-30 ENCOUNTER — TELEPHONE (OUTPATIENT)
Dept: PAIN MEDICINE | Facility: CLINIC | Age: 64
End: 2024-01-30

## 2024-02-05 ENCOUNTER — TELEPHONE (OUTPATIENT)
Age: 64
End: 2024-02-05

## 2024-02-05 DIAGNOSIS — E03.9 ACQUIRED HYPOTHYROIDISM: ICD-10-CM

## 2024-02-05 DIAGNOSIS — R73.9 HYPERGLYCEMIA: Primary | ICD-10-CM

## 2024-02-06 ENCOUNTER — HOSPITAL ENCOUNTER (OUTPATIENT)
Dept: RADIOLOGY | Facility: CLINIC | Age: 64
Discharge: HOME | End: 2024-02-06
Payer: COMMERCIAL

## 2024-02-06 ENCOUNTER — TELEPHONE (OUTPATIENT)
Age: 64
End: 2024-02-06

## 2024-02-06 VITALS
DIASTOLIC BLOOD PRESSURE: 73 MMHG | HEART RATE: 75 BPM | SYSTOLIC BLOOD PRESSURE: 129 MMHG | OXYGEN SATURATION: 93 % | TEMPERATURE: 98 F | RESPIRATION RATE: 20 BRPM

## 2024-02-06 DIAGNOSIS — M16.12 PRIMARY OSTEOARTHRITIS OF LEFT HIP: ICD-10-CM

## 2024-02-06 PROCEDURE — 20610 DRAIN/INJ JOINT/BURSA W/O US: CPT | Performed by: STUDENT IN AN ORGANIZED HEALTH CARE EDUCATION/TRAINING PROGRAM

## 2024-02-06 PROCEDURE — 77002 NEEDLE LOCALIZATION BY XRAY: CPT

## 2024-02-06 PROCEDURE — 77002 NEEDLE LOCALIZATION BY XRAY: CPT | Performed by: STUDENT IN AN ORGANIZED HEALTH CARE EDUCATION/TRAINING PROGRAM

## 2024-02-06 RX ORDER — BUPIVACAINE HCL/PF 2.5 MG/ML
4 VIAL (ML) INJECTION ONCE
Status: COMPLETED | OUTPATIENT
Start: 2024-02-06 | End: 2024-02-06

## 2024-02-06 RX ORDER — METHYLPREDNISOLONE ACETATE 80 MG/ML
80 INJECTION, SUSPENSION INTRA-ARTICULAR; INTRALESIONAL; INTRAMUSCULAR; PARENTERAL; SOFT TISSUE ONCE
Status: COMPLETED | OUTPATIENT
Start: 2024-02-06 | End: 2024-02-06

## 2024-02-06 RX ADMIN — Medication 4 ML: at 13:19

## 2024-02-06 RX ADMIN — IOHEXOL 1 ML: 300 INJECTION, SOLUTION INTRAVENOUS at 13:18

## 2024-02-06 RX ADMIN — METHYLPREDNISOLONE ACETATE 80 MG: 80 INJECTION, SUSPENSION INTRA-ARTICULAR; INTRALESIONAL; INTRAMUSCULAR; PARENTERAL; SOFT TISSUE at 13:19

## 2024-02-06 NOTE — INTERVAL H&P NOTE
Update: (This section must be completed if the H&P was completed greater than 24 hrs to procedure or admission)    H&P reviewed. After examining the patient, I find no changed to the H&P since it had been written.    Patient re-evaluated. Accept as history and physical.    Marshall Campos MD/February 6, 2024/1:09 PM

## 2024-02-06 NOTE — DISCHARGE INSTR - LAB

## 2024-02-12 DIAGNOSIS — M54.50 CHRONIC BILATERAL LOW BACK PAIN WITHOUT SCIATICA: ICD-10-CM

## 2024-02-12 DIAGNOSIS — I10 ESSENTIAL HYPERTENSION: ICD-10-CM

## 2024-02-12 DIAGNOSIS — G89.29 CHRONIC BILATERAL LOW BACK PAIN WITHOUT SCIATICA: ICD-10-CM

## 2024-02-12 DIAGNOSIS — R21 RASH: ICD-10-CM

## 2024-02-12 RX ORDER — AMMONIUM LACTATE 12 G/100G
LOTION TOPICAL
Qty: 400 ML | Refills: 0 | Status: SHIPPED | OUTPATIENT
Start: 2024-02-12

## 2024-02-12 RX ORDER — LISINOPRIL 10 MG/1
TABLET ORAL
Qty: 90 TABLET | Refills: 1 | Status: SHIPPED | OUTPATIENT
Start: 2024-02-12

## 2024-02-12 RX ORDER — GABAPENTIN 300 MG/1
600 CAPSULE ORAL 2 TIMES DAILY
Qty: 120 CAPSULE | Refills: 8 | Status: SHIPPED | OUTPATIENT
Start: 2024-02-12

## 2024-02-13 ENCOUNTER — TELEPHONE (OUTPATIENT)
Dept: PAIN MEDICINE | Facility: CLINIC | Age: 64
End: 2024-02-13

## 2024-02-16 ENCOUNTER — TELEPHONE (OUTPATIENT)
Age: 64
End: 2024-02-16

## 2024-02-16 NOTE — TELEPHONE ENCOUNTER
Caller: pt    Doctor: meng    Reason for call: pt called and wants to speak to a rn. I tried to help the pt but she only wants to speak to a rn.    Call back#: [phone]

## 2024-02-20 NOTE — TELEPHONE ENCOUNTER
"S/W pt who states after Left hip inj only had 10% improvement at one week  Today she would rate about a 50% improvement in pain level, however still having discomfort in groin, and her leg \"gives out\" on her, which is now her major concern.  States she cannot get around without a walker due to this feeling that seems to be coming from the groin.    Pt would like to know next step  Please advise  "

## 2024-03-12 DIAGNOSIS — R06.02 SOB (SHORTNESS OF BREATH): ICD-10-CM

## 2024-03-12 RX ORDER — ALBUTEROL SULFATE 90 UG/1
AEROSOL, METERED RESPIRATORY (INHALATION)
Qty: 8 G | Refills: 3 | Status: SHIPPED | OUTPATIENT
Start: 2024-03-12

## 2024-04-08 NOTE — PROGRESS NOTES
Pain Medicine Follow-Up Note    Assessment:  1. Pain of left hip    2. Arthritis of left hip    3. Ambulatory dysfunction    4. Chronic bilateral low back pain without sciatica        Plan:  Orders Placed This Encounter   Procedures    FL spine and pain procedure     After May 6th     Standing Status:   Future     Standing Expiration Date:   2028     Order Specific Question:   Reason for Exam:     Answer:   left hip injection     Order Specific Question:   Anticoagulant hold needed?     Answer:   no    Ambulatory referral to Orthopedic Surgery     Standing Status:   Future     Standing Expiration Date:   2025     Referral Priority:   Routine     Referral Type:   Consult - AMB     Referral Reason:   Specialty Services Required     Referred to Provider:   Aidan Andrew DO     Requested Specialty:   Orthopedic Surgery     Number of Visits Requested:   1     Expiration Date:   2025       New Medications Ordered This Visit   Medications    Semaglutide-Weight Management (Wegovy) 0.5 MG/0.5ML     Sig: Inject 0.5 mL under the skin Once a week    naloxone (NARCAN) 4 mg/0.1 mL nasal spray     Si mg into each nostril       My impressions and treatment recommendations were discussed in detail with the patient who verbalized understanding and had no further questions.      Patient returns the office after having a left hip injection on 2024 and a left sacroiliac joint injection on 2024.  Patient reports that the left sacroiliac joint injection is still providing her ongoing pain relief however the hip injection provided her approximately 50% pain relief which is now decreasing.  Patient has limited range of motion in the left hip.  On exam the patient's pain is primarily in the left groin, I feel the patient would benefit from a consult with an orthopedic surgeon regarding her left hip recent imaging had shown moderate arthritis.  Patient is more concerned about her lack of range of motion and  feeling like her leg will buckle.  If surgery is not recommended I did recommend that she have an additional left intra-articular hip injection. Complete risks and benefits including bleeding, infection, tissue reaction, nerve injury and allergic reaction were discussed. The approach was demonstrated using models and literature was provided. Verbal and written consent was obtained.    Follow-up is planned in 4 weeks after injection time or sooner as warranted.  Discharge instructions were provided. I personally saw and examined the patient and I agree with the above discussed plan of care.    History of Present Illness:    Delaney Garza is a 64 y.o. female who presents to [facility] for interval re-evaluation of the above stated pain complaints. The patient has a past medical and chronic pain history as outlined in the assessment section. She was last seen on 2/6/2024.    At today's visit patient states that their pain symptoms are somewhat better with a pain score of 4/10 on the verbal numeric pain scale.  Patient states she is more concerned about her lack of range of motion and the feeling that her leg will buckle.  The patient's pain is worse at night.  The patient's pain is constant in nature.  And the quality of the patient's pain is described as sharp and throbbing.  The patient's pain is located in the bilateral low back, left groin and left lateral thigh.  Patient states that she feels the injection is still providing her approximately 40% improvement in the groin but is concerned about the leg giving out and having lack of mobility.    Other than as stated above, the patient denies any interval changes in medications, medical condition, mental condition, symptoms, or allergies since the last office visit.         Review of Systems:    Review of Systems   Respiratory:  Negative for shortness of breath.    Cardiovascular:  Negative for chest pain.   Gastrointestinal:  Negative  for constipation, diarrhea, nausea and vomiting.   Musculoskeletal:  Positive for arthralgias, gait problem and myalgias. Negative for joint swelling.        Swelling   DROM   Skin:  Negative for rash.   Neurological:  Positive for weakness. Negative for dizziness and seizures.   All other systems reviewed and are negative.        Past Medical History:   Diagnosis Date    Abnormal ECG     Abnormal Pap smear of cervix     Denial     Heart aneurysm     Heart murmur     Hypertension     Hypothyroidism     Infectious viral hepatitis     hep c    Obesity (BMI 30-39.9) 2023    Overweight 2019    PAD (peripheral artery disease) (HCC)        Past Surgical History:   Procedure Laterality Date    AUGMENTATION MAMMAPLASTY       SECTION      COLONOSCOPY  2021    ENDOSCOPY OF POUCH  2021       Family History   Problem Relation Age of Onset    Other Mother         Denial    Vaginal cancer Mother 78    Coronary artery disease Father     Other Father         Denial    Heart disease Brother     Celiac disease Son     Celiac disease Son     Breast cancer Maternal Aunt        Social History     Occupational History    Not on file   Tobacco Use    Smoking status: Every Day     Current packs/day: 1.00     Average packs/day: 1 pack/day for 20.0 years (20.0 ttl pk-yrs)     Types: Cigarettes    Smokeless tobacco: Never   Vaping Use    Vaping status: Former    Substances: Nicotine   Substance and Sexual Activity    Alcohol use: Yes     Alcohol/week: 2.0 standard drinks of alcohol     Types: 1 Glasses of wine, 1 Cans of beer per week    Drug use: No    Sexual activity: Not Currently     Partners: Male         Current Outpatient Medications:     albuterol (PROVENTIL HFA,VENTOLIN HFA) 90 mcg/act inhaler, INHALE 1 PUFF BY MOUTH EVERY 6 HOURS AS NEEDED FOR WHEEZE, Disp: 8 g, Rfl: 3    ammonium lactate (LAC-HYDRIN) 12 % lotion, APPLY TOPICALLY 2 TIMES A DAY AS NEEDED FOR DRY SKIN., Disp: 400 mL, Rfl: 0    aspirin 81 MG  tablet, Take 1 tablet by mouth daily, Disp: , Rfl:     atorvastatin (LIPITOR) 20 mg tablet, TAKE 1 TABLET BY MOUTH EVERY DAY, Disp: 90 tablet, Rfl: 2    DULoxetine (CYMBALTA) 60 mg delayed release capsule, TAKE 1 CAPSULE BY MOUTH EVERY DAY, Disp: 90 capsule, Rfl: 4    gabapentin (NEURONTIN) 300 mg capsule, TAKE 2 CAPSULES BY MOUTH 2 TIMES A DAY., Disp: 120 capsule, Rfl: 8    Klor-Con M20 20 MEQ tablet, TAKE 1 TABLET BY MOUTH EVERY DAY, Disp: 90 tablet, Rfl: 2    levothyroxine 175 mcg tablet, TAKE 1 TABLET BY MOUTH EVERY DAY, Disp: 90 tablet, Rfl: 1    lisinopril (ZESTRIL) 10 mg tablet, TAKE 1 TABLET BY MOUTH EVERY DAY, Disp: 90 tablet, Rfl: 1    pantoprazole (PROTONIX) 40 mg tablet, TAKE 1 TABLET BY MOUTH EVERY DAY, Disp: 90 tablet, Rfl: 1    pseudoephedrine (SUDAFED) 30 mg tablet, Take 60 mg by mouth every 4 (four) hours as needed for congestion, Disp: , Rfl:     SUBOXONE 8-2 MG, USE 2 & 1/2 FILMS UNDER TONGUE DAILY, Disp: , Rfl: 0    torsemide (DEMADEX) 20 mg tablet, TAKE 1 TABLET BY MOUTH EVERY DAY, Disp: 90 tablet, Rfl: 2    cyclobenzaprine (FLEXERIL) 10 mg tablet, Take 1 tablet (10 mg total) by mouth 3 (three) times a day (Patient not taking: Reported on 4/9/2024), Disp: 30 tablet, Rfl: 2    fluticasone (FLONASE) 50 mcg/act nasal spray, 1 spray into each nostril 2 (two) times a day (Patient not taking: Reported on 1/8/2024), Disp: 11.1 mL, Rfl: 0    hydrocortisone 1 % cream, Apply topically 4 (four) times a day as needed for rash (Patient not taking: Reported on 1/8/2024), Disp: 30 g, Rfl: 0    naloxone (NARCAN) 4 mg/0.1 mL nasal spray, 4 mg into each nostril (Patient not taking: Reported on 4/9/2024), Disp: , Rfl:     pseudoephedrine (SUDAFED) 120 MG 12 hr tablet, Take 1 tablet (120 mg total) by mouth 2 (two) times a day (Patient not taking: Reported on 12/7/2023), Disp: 10 tablet, Rfl: 0    Semaglutide-Weight Management (Wegovy) 0.5 MG/0.5ML, Inject 0.5 mL under the skin Once a week (Patient not taking:  "Reported on 4/9/2024), Disp: , Rfl:     Allergies   Allergen Reactions    Penicillins Itching    Motrin [Ibuprofen] Blisters    Meloxicam Other (See Comments)     Face turned red and was heated for hours        Physical Exam:    /82   Pulse 67   Ht 5' 5\" (1.651 m)   Wt 86.8 kg (191 lb 6.4 oz)   BMI 31.85 kg/m²     Constitutional:normal, well developed, well nourished, alert, in no distress and non-toxic and no overt pain behavior. and obese  Eyes:anicteric  HEENT:grossly intact  Neck:supple, symmetric, trachea midline and no masses   Pulmonary:even and unlabored  Cardiovascular:No edema or pitting edema present  Skin:Normal without rashes or lesions and well hydrated  Psychiatric:Mood and affect appropriate  Neurologic:Cranial Nerves II-XII grossly intact  Musculoskeletal:antalgic and ambulates with cane    Lumbar Spine Exam    Appearance:  Normal lordosis  Palpation/Tenderness:  no tenderness or spasm  Special Tests:  Left Straight Leg Test:  negative  Left Jasbir's Maneuver:  Positive anteriorly  Left Pelvic Distraction Test:  negative        Imaging  FL spine and pain procedure    (Results Pending)         Orders Placed This Encounter   Procedures    FL spine and pain procedure    Ambulatory referral to Orthopedic Surgery       This document was created using speech voice recognition software.   Grammatical errors, random word insertions, pronoun errors, and incomplete sentences are an occasional consequence of this system due to software limitations, ambient noise, and hardware issues.   Any formal questions or concerns about content, text, or information contained within the body of this dictation should be directly addressed to the provider for clarification.    "

## 2024-04-09 ENCOUNTER — OFFICE VISIT (OUTPATIENT)
Dept: PAIN MEDICINE | Facility: CLINIC | Age: 64
End: 2024-04-09
Payer: COMMERCIAL

## 2024-04-09 ENCOUNTER — TELEPHONE (OUTPATIENT)
Dept: RADIOLOGY | Facility: CLINIC | Age: 64
End: 2024-04-09

## 2024-04-09 VITALS
BODY MASS INDEX: 31.89 KG/M2 | HEIGHT: 65 IN | DIASTOLIC BLOOD PRESSURE: 82 MMHG | HEART RATE: 67 BPM | SYSTOLIC BLOOD PRESSURE: 147 MMHG | WEIGHT: 191.4 LBS

## 2024-04-09 DIAGNOSIS — M16.12 ARTHRITIS OF LEFT HIP: ICD-10-CM

## 2024-04-09 DIAGNOSIS — G89.29 CHRONIC BILATERAL LOW BACK PAIN WITHOUT SCIATICA: ICD-10-CM

## 2024-04-09 DIAGNOSIS — R26.2 AMBULATORY DYSFUNCTION: ICD-10-CM

## 2024-04-09 DIAGNOSIS — M54.50 CHRONIC BILATERAL LOW BACK PAIN WITHOUT SCIATICA: ICD-10-CM

## 2024-04-09 DIAGNOSIS — M25.552 PAIN OF LEFT HIP: Primary | ICD-10-CM

## 2024-04-09 PROCEDURE — 99214 OFFICE O/P EST MOD 30 MIN: CPT

## 2024-04-09 RX ORDER — SEMAGLUTIDE 0.5 MG/.5ML
0.5 INJECTION, SOLUTION SUBCUTANEOUS WEEKLY
COMMUNITY
Start: 2024-04-03

## 2024-04-09 RX ORDER — NALOXONE HYDROCHLORIDE 4 MG/.1ML
4 SPRAY NASAL
COMMUNITY
Start: 2024-01-31

## 2024-04-09 NOTE — TELEPHONE ENCOUNTER
Pt scheduled for hip injection with Dr Campos on 5/7/24    No medication holds needed for joint injection    Pt given instructions in office -- does not have myc for follow up message.    Have you completed PT/HEP/Chiro in the past 6 months for dedicated area? Not within 6 mos  If yes, how long did you complete?  What was the frequency?  Did it provide relief?  If no, reason therapy was not completed? Per Dr Rosa consult note on 1/8/24, both PT and chiro were completed in the past and provided moderate relief.  Pt also has history of injections, which provided excellent relief

## 2024-04-21 DIAGNOSIS — M25.551 RIGHT HIP PAIN: ICD-10-CM

## 2024-04-21 DIAGNOSIS — M25.552 LEFT HIP PAIN: Primary | ICD-10-CM

## 2024-04-23 ENCOUNTER — OFFICE VISIT (OUTPATIENT)
Dept: OBGYN CLINIC | Facility: CLINIC | Age: 64
End: 2024-04-23
Payer: COMMERCIAL

## 2024-04-23 ENCOUNTER — APPOINTMENT (OUTPATIENT)
Dept: RADIOLOGY | Facility: CLINIC | Age: 64
End: 2024-04-23
Payer: COMMERCIAL

## 2024-04-23 VITALS
HEIGHT: 65 IN | WEIGHT: 190.2 LBS | HEART RATE: 76 BPM | SYSTOLIC BLOOD PRESSURE: 147 MMHG | BODY MASS INDEX: 31.69 KG/M2 | DIASTOLIC BLOOD PRESSURE: 81 MMHG

## 2024-04-23 DIAGNOSIS — M51.36 DEGENERATIVE DISC DISEASE, LUMBAR: Primary | ICD-10-CM

## 2024-04-23 DIAGNOSIS — M25.552 LEFT HIP PAIN: ICD-10-CM

## 2024-04-23 DIAGNOSIS — M16.12 ARTHRITIS OF LEFT HIP: ICD-10-CM

## 2024-04-23 DIAGNOSIS — M25.552 PAIN OF LEFT HIP: ICD-10-CM

## 2024-04-23 DIAGNOSIS — M25.551 RIGHT HIP PAIN: ICD-10-CM

## 2024-04-23 DIAGNOSIS — R26.2 AMBULATORY DYSFUNCTION: ICD-10-CM

## 2024-04-23 PROCEDURE — 99214 OFFICE O/P EST MOD 30 MIN: CPT | Performed by: ORTHOPAEDIC SURGERY

## 2024-04-23 PROCEDURE — 73522 X-RAY EXAM HIPS BI 3-4 VIEWS: CPT

## 2024-04-23 NOTE — PROGRESS NOTES
Orthopaedics Office Visit - New Patient Visit    ASSESSMENT/PLAN:    Assessment:   Left hip osteoarthritis   Lumbar degeneration   Weakness   Urinary incontinence (does not appear to be resulting from any cauda equina/lumbar compression on most recent MRI)   1 PPD smoker     Plan:   X-rays were performed in the office and reviewed   It was discussed with Delaney that her symptoms are multi factorial in nature   She has hip flexion weakness and would benefit from PT, PT script was provided   She was provided with a physician directed HEP in the mean time   Smoking cessation was discussed prior to a left total hip arthroplasty   Advised to discuss smoking cessation and urinary incontinence with PCP  I will see her back in 8 weeks time for clinical re-evaluation     To Do Next Visit:  Re-evaluation     _____________________________________________________  CHIEF COMPLAINT:  Chief Complaint   Patient presents with    Left Hip - Pain         SUBJECTIVE:  Delaney Garza is a 64 y.o. female who presents to the office for left hip pain. She notes pain to her lower back and groin. Pain has been ongoing for approx. 8 months. She denies any injury or trauma. Pain will worsen when she stands at the sink to do dishes or when she is getting in and out of the car. She underwent a FL guided left hip SI injection on 1/23/24, which was beneficial for her. She underwent a FL guided left hip intra-articular CSI, which was not significantly beneficial for her as she notes the SI joint injection provided her with the most relief. She notes urine incontinence, which was evaluated with a lumbar spine MRI and did not show any cord compression. She is taking Ibuprofen for pain control. She is on Suboxone. She smokes a pack a day.       PAST MEDICAL HISTORY:  Past Medical History:   Diagnosis Date    Abnormal ECG     Abnormal Pap smear of cervix     Denial     Heart aneurysm     Heart murmur     Hypertension     Hypothyroidism      Infectious viral hepatitis     hep c    Obesity (BMI 30-39.9) 2023    Overweight 2019    PAD (peripheral artery disease) (HCC)        PAST SURGICAL HISTORY:  Past Surgical History:   Procedure Laterality Date    AUGMENTATION MAMMAPLASTY       SECTION      COLONOSCOPY  2021    ENDOSCOPY OF POUCH  2021       FAMILY HISTORY:  Family History   Problem Relation Age of Onset    Other Mother         Denial    Vaginal cancer Mother 78    Coronary artery disease Father     Other Father         Denial    Heart disease Brother     Celiac disease Son     Celiac disease Son     Breast cancer Maternal Aunt        SOCIAL HISTORY:  Social History     Tobacco Use    Smoking status: Every Day     Current packs/day: 1.00     Average packs/day: 1 pack/day for 20.0 years (20.0 ttl pk-yrs)     Types: Cigarettes    Smokeless tobacco: Never   Vaping Use    Vaping status: Former    Substances: Nicotine   Substance Use Topics    Alcohol use: Yes     Alcohol/week: 2.0 standard drinks of alcohol     Types: 1 Glasses of wine, 1 Cans of beer per week    Drug use: No       MEDICATIONS:    Current Outpatient Medications:     albuterol (PROVENTIL HFA,VENTOLIN HFA) 90 mcg/act inhaler, INHALE 1 PUFF BY MOUTH EVERY 6 HOURS AS NEEDED FOR WHEEZE, Disp: 8 g, Rfl: 3    ammonium lactate (LAC-HYDRIN) 12 % lotion, APPLY TOPICALLY 2 TIMES A DAY AS NEEDED FOR DRY SKIN., Disp: 400 mL, Rfl: 0    aspirin 81 MG tablet, Take 1 tablet by mouth daily, Disp: , Rfl:     atorvastatin (LIPITOR) 20 mg tablet, TAKE 1 TABLET BY MOUTH EVERY DAY, Disp: 90 tablet, Rfl: 2    DULoxetine (CYMBALTA) 60 mg delayed release capsule, TAKE 1 CAPSULE BY MOUTH EVERY DAY, Disp: 90 capsule, Rfl: 4    gabapentin (NEURONTIN) 300 mg capsule, TAKE 2 CAPSULES BY MOUTH 2 TIMES A DAY., Disp: 120 capsule, Rfl: 8    levothyroxine 175 mcg tablet, TAKE 1 TABLET BY MOUTH EVERY DAY, Disp: 90 tablet, Rfl: 1    lisinopril (ZESTRIL) 10 mg tablet, TAKE 1 TABLET BY MOUTH EVERY DAY,  Disp: 90 tablet, Rfl: 1    pantoprazole (PROTONIX) 40 mg tablet, TAKE 1 TABLET BY MOUTH EVERY DAY, Disp: 90 tablet, Rfl: 1    pseudoephedrine (SUDAFED) 30 mg tablet, Take 60 mg by mouth every 4 (four) hours as needed for congestion, Disp: , Rfl:     SUBOXONE 8-2 MG, USE 2 & 1/2 FILMS UNDER TONGUE DAILY, Disp: , Rfl: 0    torsemide (DEMADEX) 20 mg tablet, TAKE 1 TABLET BY MOUTH EVERY DAY, Disp: 90 tablet, Rfl: 2    cyclobenzaprine (FLEXERIL) 10 mg tablet, Take 1 tablet (10 mg total) by mouth 3 (three) times a day (Patient not taking: Reported on 4/9/2024), Disp: 30 tablet, Rfl: 2    fluticasone (FLONASE) 50 mcg/act nasal spray, 1 spray into each nostril 2 (two) times a day (Patient not taking: Reported on 1/8/2024), Disp: 11.1 mL, Rfl: 0    hydrocortisone 1 % cream, Apply topically 4 (four) times a day as needed for rash (Patient not taking: Reported on 1/8/2024), Disp: 30 g, Rfl: 0    Klor-Con M20 20 MEQ tablet, TAKE 1 TABLET BY MOUTH EVERY DAY (Patient not taking: Reported on 4/23/2024), Disp: 90 tablet, Rfl: 2    naloxone (NARCAN) 4 mg/0.1 mL nasal spray, 4 mg into each nostril (Patient not taking: Reported on 4/9/2024), Disp: , Rfl:     pseudoephedrine (SUDAFED) 120 MG 12 hr tablet, Take 1 tablet (120 mg total) by mouth 2 (two) times a day (Patient not taking: Reported on 12/7/2023), Disp: 10 tablet, Rfl: 0    Semaglutide-Weight Management (Wegovy) 0.5 MG/0.5ML, Inject 0.5 mL under the skin Once a week (Patient not taking: Reported on 4/9/2024), Disp: , Rfl:     ALLERGIES:  Allergies   Allergen Reactions    Penicillins Itching    Motrin [Ibuprofen] Blisters    Meloxicam Other (See Comments)     Face turned red and was heated for hours        REVIEW OF SYSTEMS:  MSK: as noted in HPI  Neuro: WNL's  Pertinent items are otherwise noted in HPI.  A comprehensive review of systems was otherwise negative.    LABS:  HgA1c:   Lab Results   Component Value Date    HGBA1C 6.0 (H) 12/08/2023     BMP:   Lab Results   Component  "Value Date    CALCIUM 9.9 12/08/2023    K 3.6 12/08/2023    CO2 34 (H) 12/08/2023     12/08/2023    BUN 15 12/08/2023    CREATININE 0.67 12/08/2023     CBC: No components found for: \"CBC\"    _____________________________________________________  PHYSICAL EXAMINATION:  Vital signs: /81   Pulse 76   Ht 5' 5\" (1.651 m)   Wt 86.3 kg (190 lb 3.2 oz)   BMI 31.65 kg/m²   General: No acute distress, awake and alert  Psychiatric: Mood and affect appear appropriate  HEENT: Trachea Midline, No torticollis, no apparent facial trauma  Cardiovascular: No audible murmurs; Extremities appear perfused  Pulmonary: No audible wheezing or stridor  Skin: No open lesions; see further details (if any) below    MUSCULOSKELETAL EXAMINATION:    Extremities:  Left hip     No erythema, ecchymosis or edema  Weakness with hip flexion   Pain with internal and external rotation   Limited hip flexion, internal rotation and external rotation  Leg lengths appear nearly equal   Ambulates with a cane   Extremity appears warm and well perfused      _____________________________________________________  STUDIES REVIEWED:  I personally reviewed the images and interpretation is as follows:  X-rays of bilateral hips demonstrate no acute fracture or dislocation, significant left hip osteoarthritis. Right hip degenerative changes noted.        PROCEDURES PERFORMED:  Procedures    Scribe Attestation      I,:  Shira Malik am acting as a scribe while in the presence of the attending physician.:       I,:  Albert Torres MD personally performed the services described in this documentation    as scribed in my presence.:             "

## 2024-05-18 DIAGNOSIS — F33.41 RECURRENT MAJOR DEPRESSIVE DISORDER, IN PARTIAL REMISSION (HCC): ICD-10-CM

## 2024-05-20 RX ORDER — DULOXETIN HYDROCHLORIDE 60 MG/1
CAPSULE, DELAYED RELEASE ORAL
Qty: 90 CAPSULE | Refills: 0 | Status: SHIPPED | OUTPATIENT
Start: 2024-05-20

## 2024-05-29 DIAGNOSIS — R13.19 ESOPHAGEAL DYSPHAGIA: ICD-10-CM

## 2024-05-29 DIAGNOSIS — K21.9 GASTROESOPHAGEAL REFLUX DISEASE WITHOUT ESOPHAGITIS: ICD-10-CM

## 2024-05-30 RX ORDER — PANTOPRAZOLE SODIUM 40 MG/1
TABLET, DELAYED RELEASE ORAL
Qty: 90 TABLET | Refills: 1 | Status: SHIPPED | OUTPATIENT
Start: 2024-05-30

## 2024-06-01 DIAGNOSIS — F33.41 RECURRENT MAJOR DEPRESSIVE DISORDER, IN PARTIAL REMISSION (HCC): ICD-10-CM

## 2024-06-01 DIAGNOSIS — E03.9 ACQUIRED HYPOTHYROIDISM: ICD-10-CM

## 2024-06-03 RX ORDER — LEVOTHYROXINE SODIUM 175 UG/1
TABLET ORAL
Qty: 90 TABLET | Refills: 1 | Status: SHIPPED | OUTPATIENT
Start: 2024-06-03

## 2024-06-03 RX ORDER — DULOXETIN HYDROCHLORIDE 60 MG/1
CAPSULE, DELAYED RELEASE ORAL
Qty: 90 CAPSULE | Refills: 0 | Status: SHIPPED | OUTPATIENT
Start: 2024-06-03

## 2024-06-24 ENCOUNTER — TELEPHONE (OUTPATIENT)
Dept: OBGYN CLINIC | Facility: HOSPITAL | Age: 64
End: 2024-06-24

## 2024-06-24 DIAGNOSIS — M51.36 DEGENERATIVE DISC DISEASE, LUMBAR: ICD-10-CM

## 2024-06-24 DIAGNOSIS — M16.12 ARTHRITIS OF LEFT HIP: Primary | ICD-10-CM

## 2024-06-24 NOTE — TELEPHONE ENCOUNTER
Caller: Delaney    Doctor: Brian    Reason for call: Patient needs a updated PT Script placed into Epic, There is one from April but needs updated one.    Patient is asking for a call once placed into system so she can call and schedule appointment.     Call back#: [phone] - ok to leave detailed message

## 2024-07-17 ENCOUNTER — EVALUATION (OUTPATIENT)
Dept: PHYSICAL THERAPY | Facility: CLINIC | Age: 64
End: 2024-07-17
Payer: COMMERCIAL

## 2024-07-17 DIAGNOSIS — M51.36 DEGENERATIVE DISC DISEASE, LUMBAR: ICD-10-CM

## 2024-07-17 DIAGNOSIS — M16.12 ARTHRITIS OF LEFT HIP: ICD-10-CM

## 2024-07-17 PROCEDURE — 97110 THERAPEUTIC EXERCISES: CPT | Performed by: PHYSICAL THERAPIST

## 2024-07-17 PROCEDURE — 97161 PT EVAL LOW COMPLEX 20 MIN: CPT | Performed by: PHYSICAL THERAPIST

## 2024-07-17 NOTE — PROGRESS NOTES
PT Evaluation     Today's date: 2024  Patient name: Delaney Garza  : 1960  MRN: [id number]  Referring provider: Yuri Galvan PA*  Dx:   Encounter Diagnosis     ICD-10-CM    1. Arthritis of left hip  M16.12 Ambulatory Referral to Physical Therapy      2. Degenerative disc disease, lumbar  M51.36 Ambulatory Referral to Physical Therapy                     Assessment  Impairments: abnormal or restricted ROM, impaired physical strength, lacks appropriate home exercise program and pain with function    Assessment details: Delaney Garza is a 64 y.o. female who presents with pain, decreased strength, and decreased ROM.  Due to these impairments, patient has difficulty performing a/iadls and recreational activities.  Patient's clinical presentation is consistent with their referring diagnoses of arthritis of left hip  and L/S degenerative disc disease.  Patient would benefit from skilled physical therapy to address their aforementioned impairments, improve their level of function and to improve their overall quality of life.  Patient states she wishes to attend PT one to two times per week secondary to financial restrictions.   Understanding of Dx/Px/POC: good     Prognosis: good    Goals  Short term goals - to be achieved in 4 weeks:    Decrease pain 20-50%.   Increase strength by 1/2 grade.   Improve L/S ROM by 25%.   Improve left hip ROM by 25%.  Long term goals - to be achieved by discharge:    Performance in related household activities is improved to maximal level of function.    IADL performance in related activities is improved to maximal level of function.    Standing tolerance is improved to maximal level of function.    Ambulation is improved to maximal level of function.     Plan    Planned therapy interventions: manual therapy, neuromuscular re-education, patient/caregiver education, postural training, strengthening, stretching, therapeutic activities, therapeutic exercise and home  exercise program    Frequency: 1-2 times per week.  Duration in weeks: 6  Plan of Care beginning date: 2024  Plan of Care expiration date: 2024  Treatment plan discussed with: patient        Subjective Evaluation    History of Present Illness  Mechanism of injury: Patient refers to PT with c/o pain in her left hip and L/S which began approximately one year previous after performing cleaning activities.  Patient received an MRI of her L/S in 2023 which revealed mild disc and facet degenerative change throughout the lumbar spine; no evidence of nerve root encroachment.  Patient received X-rays of bilateral hips on 24 which revealed mild OA of the right hip and moderate OA of the left hip.  Patient states she had radiating symptoms in her left LE which subsided after injection in 2024.  Patient c/o pain in her right knee and intermittent numbness and tingling in digits 1 and 2 of her right foot.  Patient states increased low back pain after standing for approximately five minutes, states decreased pain when sitting.    Pain  Current pain ratin  At best pain ratin  At worst pain ratin          Objective     Neurological Testing     Sensation     Lumbar   Left   Diminished: light touch    Right   Intact: light touch    Comments   Left light touch: Decreased L4 + L5    Active Range of Motion     Lumbar   Flexion:  Restriction level: moderate  Extension:  Restriction level: maximal  Left lateral flexion:  Restriction level: moderate  Right lateral flexion:  Restriction level: moderate  Left Hip   Flexion: 90 degrees   Abduction: 25 degrees   External rotation (90/90): 40 degrees   Internal rotation (90/90): 20 degrees     Passive Range of Motion   Left Hip   Flexion: 95 degrees   Abduction: 30 degrees   External rotation (90/90): 45 degrees   Internal rotation (90/90): 25 degrees     Strength/Myotome Testing     Left Hip   Planes of Motion   Flexion: 2+  Extension: 3-  Abduction:  3-  Adduction: 3+    Right Hip   Planes of Motion   Flexion: 4-  Extension: 4  Abduction: 4-  Adduction: 4    General Comments:      Lumbar Comments  Repeated extension in standing: No effect  Repeated flexion in sitting: No effect             Precautions: PAD, HTN, Hypothyroidism   POC expires Unit limit Auth Expiration date PT/OT/ST + Visit Limit?   8/30/24 N/A  BOMN                           Visit/Unit Tracking  AUTH Status:  Date 7/17               Used                Remaining                     Manuals 7/17                                                                Neuro Re-Ed             Sup TA activation HEP            Sup TA with marches HEP            Sup TA with alt UE/LE             TB Multifidus press                                                    Ther Ex             Bike              L/S ext in stand against counter/table HEP            Mini squats HEP            Stand hip abd              Stand hip ext             Leg press             TB rows             TB lat pull downs             Ther Activity                                       Gait Training                                       Modalities

## 2024-07-25 ENCOUNTER — OFFICE VISIT (OUTPATIENT)
Dept: PHYSICAL THERAPY | Facility: CLINIC | Age: 64
End: 2024-07-25
Payer: COMMERCIAL

## 2024-07-25 DIAGNOSIS — M51.36 DEGENERATIVE DISC DISEASE, LUMBAR: ICD-10-CM

## 2024-07-25 DIAGNOSIS — M16.12 ARTHRITIS OF LEFT HIP: Primary | ICD-10-CM

## 2024-07-25 PROCEDURE — 97012 MECHANICAL TRACTION THERAPY: CPT | Performed by: PHYSICAL THERAPIST

## 2024-07-25 PROCEDURE — 97110 THERAPEUTIC EXERCISES: CPT | Performed by: PHYSICAL THERAPIST

## 2024-07-25 NOTE — PROGRESS NOTES
"Daily Note     Today's date: 2024  Patient name: Delaney Garza  : 1960  MRN: [id number]  Referring provider: Yuri Galvan PA*  Dx:   Encounter Diagnosis     ICD-10-CM    1. Arthritis of left hip  M16.12       2. Degenerative disc disease, lumbar  M51.36                      Subjective: Patient stated moderate pain prior to treatment session.      Objective: See treatment diary below      Assessment: Patient performed all activities without significant c/o pain; stated minimal pain reduction after completion of mechanical traction.       Plan: Continue per plan of care.      Precautions: PAD, HTN, Hypothyroidism   POC expires Unit limit Auth Expiration date PT/OT/ST + Visit Limit?   24 N/A  BOMN                           Visit/Unit Tracking  AUTH Status:  Date              18 visits Used 1 2              Remaining  17 16                  Manuals                                                                Neuro Re-Ed             Sup TA activation HEP            Sup TA with marches HEP            Sup TA with alt UE/LE             TB Multifidus press  RTB 20x3\" ea.                                                  Ther Ex             Bike   5 min           L/S ext in stand against counter/table HEP            Mini squats HEP            Stand hip abd   2x10 ea.           Stand hip ext  2x10 ea.           Leg press  45# 3x10           TB rows  RTB 2x10           TB lat pull downs  RTB 2x10           Ther Activity                                       Gait Training                                       Modalities             Mechanical traction  95# 15' static                             "

## 2024-07-30 ENCOUNTER — OFFICE VISIT (OUTPATIENT)
Dept: OBGYN CLINIC | Facility: CLINIC | Age: 64
End: 2024-07-30
Payer: COMMERCIAL

## 2024-07-30 VITALS
SYSTOLIC BLOOD PRESSURE: 137 MMHG | HEIGHT: 65 IN | HEART RATE: 81 BPM | DIASTOLIC BLOOD PRESSURE: 73 MMHG | BODY MASS INDEX: 31.65 KG/M2

## 2024-07-30 DIAGNOSIS — M51.36 DEGENERATIVE DISC DISEASE, LUMBAR: Primary | ICD-10-CM

## 2024-07-30 DIAGNOSIS — G95.9 LUMBAR MYELOPATHY (HCC): ICD-10-CM

## 2024-07-30 DIAGNOSIS — M16.12 ARTHRITIS OF LEFT HIP: ICD-10-CM

## 2024-07-30 DIAGNOSIS — G89.29 CHRONIC BILATERAL LOW BACK PAIN WITHOUT SCIATICA: ICD-10-CM

## 2024-07-30 DIAGNOSIS — M54.50 CHRONIC BILATERAL LOW BACK PAIN WITHOUT SCIATICA: ICD-10-CM

## 2024-07-30 PROCEDURE — 99214 OFFICE O/P EST MOD 30 MIN: CPT | Performed by: ORTHOPAEDIC SURGERY

## 2024-07-30 RX ORDER — IBUPROFEN 800 MG/1
800 TABLET ORAL EVERY 8 HOURS PRN
COMMUNITY
Start: 2024-07-10

## 2024-07-30 RX ORDER — TIZANIDINE 4 MG/1
4 TABLET ORAL EVERY 8 HOURS PRN
COMMUNITY
Start: 2024-07-02

## 2024-07-30 RX ORDER — BUPRENORPHINE 8 MG/1
8 TABLET SUBLINGUAL 2 TIMES DAILY
COMMUNITY
Start: 2024-07-31

## 2024-07-30 RX ORDER — GABAPENTIN 300 MG/1
600 CAPSULE ORAL 3 TIMES DAILY
Qty: 100 CAPSULE | Refills: 2 | Status: SHIPPED | OUTPATIENT
Start: 2024-07-30

## 2024-07-30 NOTE — PROGRESS NOTES
Orthopaedics Office Visit - Established Patient Visit    ASSESSMENT/PLAN:    Assessment:   Left hip osteoarthritis  - chronic  Lumbar degeneration and radiculopathy - chronic  Weakness  to LLE, mildly improved with PT  Urinary incontinence (does not appear to be resulting from any cauda equina/lumbar compression on most recent MRI)   1 PPD smoker - trying to reduce but still smoking    Plan:   Prescription management - advised to increased gabapentin 600 mg from twice daily to three times daily - new rx sent today to reflect new dosing  She will continue PT   We discussed groin pain is due to the underlying left hip arthritis  The burning pain she gets in both of her legs at night, that wakes her from sleep is coming from her lumbar spine   She does not currently see anyone for her lumbar spine, a referral was provided to Neurosurgery as she feels previous injections were not beneficial for her    Spine and pain procedure was ordered for a FL guided left hip IA CSI with Dr. Campos     Continue to work on smoking cessation   Follow up in the office approx. 6 weeks after the FL guided CSI     To Do Next Visit:  Re-evaluation     _____________________________________________________  CHIEF COMPLAINT:  Chief Complaint   Patient presents with    Left Hip - Follow-up         SUBJECTIVE:  Delaney Garza is a 64 y.o. female who presents to the office for a follow up regarding her left hip. She has had 2 therapy sessions thus far. She feels it is too early to tell if therapy has been beneficial for her. She notes most of her pain is to her lower back and to her left groin. She notes difficulty sleeping at night due to diffuse bilateral leg pain. She is taking Ibuprofen for pain control. She is taking 600 MG of Gabapentin at night. She previously underwent IA FL guided left hip injections with Dr. Campos, which she states were not significantly beneficial for her. She is working on smoking cessation.       PAST MEDICAL  HISTORY:  Past Medical History:   Diagnosis Date    Abnormal ECG     Abnormal Pap smear of cervix     Denial     Heart aneurysm     Heart murmur     Hypertension     Hypothyroidism     Infectious viral hepatitis     hep c    Obesity (BMI 30-39.9) 2023    Overweight 2019    PAD (peripheral artery disease) (HCC)        PAST SURGICAL HISTORY:  Past Surgical History:   Procedure Laterality Date    AUGMENTATION MAMMAPLASTY       SECTION      COLONOSCOPY  2021    ENDOSCOPY OF POUCH  2021       FAMILY HISTORY:  Family History   Problem Relation Age of Onset    Other Mother         Denial    Vaginal cancer Mother 78    Coronary artery disease Father     Other Father         Denial    Heart disease Brother     Celiac disease Son     Celiac disease Son     Breast cancer Maternal Aunt        SOCIAL HISTORY:  Social History     Tobacco Use    Smoking status: Every Day     Current packs/day: 1.00     Average packs/day: 1 pack/day for 20.0 years (20.0 ttl pk-yrs)     Types: Cigarettes    Smokeless tobacco: Never   Vaping Use    Vaping status: Former    Substances: Nicotine   Substance Use Topics    Alcohol use: Yes     Alcohol/week: 2.0 standard drinks of alcohol     Types: 1 Glasses of wine, 1 Cans of beer per week    Drug use: No       MEDICATIONS:    Current Outpatient Medications:     albuterol (PROVENTIL HFA,VENTOLIN HFA) 90 mcg/act inhaler, INHALE 1 PUFF BY MOUTH EVERY 6 HOURS AS NEEDED FOR WHEEZE, Disp: 8 g, Rfl: 3    ammonium lactate (LAC-HYDRIN) 12 % lotion, APPLY TOPICALLY 2 TIMES A DAY AS NEEDED FOR DRY SKIN., Disp: 400 mL, Rfl: 0    aspirin 81 MG tablet, Take 1 tablet by mouth daily, Disp: , Rfl:     atorvastatin (LIPITOR) 20 mg tablet, TAKE 1 TABLET BY MOUTH EVERY DAY, Disp: 90 tablet, Rfl: 2    [START ON 2024] buprenorphine (SUBUTEX) 8 mg, Place 8 mg under the tongue 2 (two) times a day Do not start before 2024., Disp: , Rfl:     DULoxetine (CYMBALTA) 60 mg delayed release  capsule, TAKE 1 CAPSULE BY MOUTH EVERY DAY, Disp: 90 capsule, Rfl: 0    gabapentin (NEURONTIN) 300 mg capsule, TAKE 2 CAPSULES BY MOUTH 2 TIMES A DAY., Disp: 120 capsule, Rfl: 8    ibuprofen (MOTRIN) 800 mg tablet, Take 800 mg by mouth every 8 (eight) hours as needed for mild pain or moderate pain, Disp: , Rfl:     levothyroxine 175 mcg tablet, TAKE 1 TABLET BY MOUTH EVERY DAY, Disp: 90 tablet, Rfl: 1    lisinopril (ZESTRIL) 10 mg tablet, TAKE 1 TABLET BY MOUTH EVERY DAY, Disp: 90 tablet, Rfl: 1    pantoprazole (PROTONIX) 40 mg tablet, TAKE 1 TABLET BY MOUTH EVERY DAY, Disp: 90 tablet, Rfl: 1    pseudoephedrine (SUDAFED) 30 mg tablet, Take 60 mg by mouth every 4 (four) hours as needed for congestion, Disp: , Rfl:     SUBOXONE 8-2 MG, USE 2 & 1/2 FILMS UNDER TONGUE DAILY, Disp: , Rfl: 0    tiZANidine (ZANAFLEX) 4 mg tablet, Take 4 mg by mouth every 8 (eight) hours as needed, Disp: , Rfl:     torsemide (DEMADEX) 20 mg tablet, TAKE 1 TABLET BY MOUTH EVERY DAY, Disp: 90 tablet, Rfl: 2    cyclobenzaprine (FLEXERIL) 10 mg tablet, Take 1 tablet (10 mg total) by mouth 3 (three) times a day (Patient not taking: Reported on 4/9/2024), Disp: 30 tablet, Rfl: 2    Klor-Con M20 20 MEQ tablet, TAKE 1 TABLET BY MOUTH EVERY DAY (Patient not taking: Reported on 4/23/2024), Disp: 90 tablet, Rfl: 2    naloxone (NARCAN) 4 mg/0.1 mL nasal spray, 4 mg into each nostril (Patient not taking: Reported on 4/9/2024), Disp: , Rfl:     ALLERGIES:  Allergies   Allergen Reactions    Penicillins Itching    Motrin [Ibuprofen] Blisters    Meloxicam Other (See Comments)     Face turned red and was heated for hours        REVIEW OF SYSTEMS:  MSK: as noted in HPI  Neuro: WNL's  Pertinent items are otherwise noted in HPI.  A comprehensive review of systems was otherwise negative.    LABS:  HgA1c:   Lab Results   Component Value Date    HGBA1C 6.1 (H) 07/10/2024     BMP:   Lab Results   Component Value Date    CALCIUM 9.4 07/10/2024    K 3.7 07/10/2024     "CO2 32 (H) 07/10/2024    CL 99 (L) 07/10/2024    BUN 16 07/10/2024    CREATININE 0.64 07/10/2024     CBC: No components found for: \"CBC\"    _____________________________________________________  PHYSICAL EXAMINATION:  Vital signs: Ht 5' 5\" (1.651 m)   BMI 31.65 kg/m²   General: No acute distress, awake and alert  Psychiatric: Mood and affect appear appropriate  HEENT: Trachea Midline, No torticollis, no apparent facial trauma  Cardiovascular: No audible murmurs; Extremities appear perfused  Pulmonary: No audible wheezing or stridor  Skin: No open lesions; see further details (if any) below    MUSCULOSKELETAL EXAMINATION:    Extremities:  Left hip     No erythema, ecchymosis or edema  Pain with hip internal and external rotation   - stinchfeild test   Ambulates with a walker   Extremity appears warm and well perfused     _____________________________________________________  STUDIES REVIEWED:  I personally reviewed the images and my independent interpretation is as follows: No new imaging to review       PROCEDURES PERFORMED:  Procedures    Scribe Attestation      I,:  Shira Malik am acting as a scribe while in the presence of the attending physician.:       I,:  Albert Torres MD personally performed the services described in this documentation    as scribed in my presence.:           "

## 2024-07-31 ENCOUNTER — OFFICE VISIT (OUTPATIENT)
Dept: PHYSICAL THERAPY | Facility: CLINIC | Age: 64
End: 2024-07-31
Payer: COMMERCIAL

## 2024-07-31 ENCOUNTER — TELEPHONE (OUTPATIENT)
Dept: RADIOLOGY | Facility: CLINIC | Age: 64
End: 2024-07-31

## 2024-07-31 DIAGNOSIS — M51.36 DEGENERATIVE DISC DISEASE, LUMBAR: ICD-10-CM

## 2024-07-31 DIAGNOSIS — M16.12 ARTHRITIS OF LEFT HIP: Primary | ICD-10-CM

## 2024-07-31 PROCEDURE — 97110 THERAPEUTIC EXERCISES: CPT | Performed by: PHYSICAL THERAPIST

## 2024-07-31 PROCEDURE — 97112 NEUROMUSCULAR REEDUCATION: CPT | Performed by: PHYSICAL THERAPIST

## 2024-07-31 NOTE — PROGRESS NOTES
"Daily Note     Today's date: 2024  Patient name: Delaney Garza  : 1960  MRN: [id number]  Referring provider: Yuri Galvan PA*  Dx:   Encounter Diagnosis     ICD-10-CM    1. Arthritis of left hip  M16.12       2. Degenerative disc disease, lumbar  M51.36                      Subjective: Patient stated moderate soreness in her low back prior to treatment session.      Objective: See treatment diary below      Assessment: Patient performed progressions without c/o pain; no c/o at completion of treatment session.       Plan: Continue per plan of care.      Precautions: PAD, HTN, Hypothyroidism   POC expires Unit limit Auth Expiration date PT/OT/ST + Visit Limit?   24 N/A  BOMN                           Visit/Unit Tracking  AUTH Status:  Date             18 visits Used 1 2 3             Remaining  17 16 15                 Manuals                                                               Neuro Re-Ed             Sup TA activation HEP            Sup TA with marches HEP  2x10 ea.          Sup TA with alt UE/LE             TB Multifidus press  RTB 20x3\" ea. RTB 20x3\" ea.          Supine Pball press with TA activation   20x5\"          Supine clamshell with TA activation   RTB 20x3\"                       Ther Ex             Bike   5 min 6 min          L/S ext in stand against counter/table HEP            Mini squats HEP            Stand hip abd   2x10 ea. 2x10 ea.          Stand hip ext  2x10 ea. 2x10 ea.          Leg press  45# 3x10 45# 5x10          TB rows  RTB 2x10 RTB 2x10          TB lat pull downs  RTB 2x10 RTB 2x10          Ther Activity                                       Gait Training                                       Modalities             Mechanical traction  95# 15' static                             "

## 2024-07-31 NOTE — TELEPHONE ENCOUNTER
Pt is scheduled for hip injection with Dr Campos on 8/27/24    Pt is not diabetic and injection type does not require med holds    Pt given instructions over phone -- no myc available for follow up message    Have you completed PT/HEP/Chiro in the past 6 months for dedicated area? PT with [facility], 7/17 thru present  If yes, how long did you complete?  What was the frequency?  Did it provide relief? Per Dr Torres's note on 7/30/24, pt reported mild improvement  If no, reason therapy was not completed?

## 2024-08-07 ENCOUNTER — TELEPHONE (OUTPATIENT)
Age: 64
End: 2024-08-07

## 2024-08-07 NOTE — TELEPHONE ENCOUNTER
Caller: patient    Doctor: meng    Reason for call: would like to be added to cancellation list    Call back#:

## 2024-08-07 NOTE — TELEPHONE ENCOUNTER
Verified pt is already on wait list -- will be contacted if an opening becomes available sooner than 8/27

## 2024-08-08 ENCOUNTER — APPOINTMENT (OUTPATIENT)
Dept: PHYSICAL THERAPY | Facility: CLINIC | Age: 64
End: 2024-08-08
Payer: COMMERCIAL

## 2024-08-13 NOTE — TELEPHONE ENCOUNTER
Confirmed to patient that she is on the wait list and will be contacted if something sooner opens up.

## 2024-08-15 ENCOUNTER — APPOINTMENT (OUTPATIENT)
Dept: PHYSICAL THERAPY | Facility: CLINIC | Age: 64
End: 2024-08-15
Payer: COMMERCIAL

## 2024-08-16 ENCOUNTER — OFFICE VISIT (OUTPATIENT)
Dept: PHYSICAL THERAPY | Facility: CLINIC | Age: 64
End: 2024-08-16
Payer: COMMERCIAL

## 2024-08-16 DIAGNOSIS — M16.12 ARTHRITIS OF LEFT HIP: Primary | ICD-10-CM

## 2024-08-16 DIAGNOSIS — M51.36 DEGENERATIVE DISC DISEASE, LUMBAR: ICD-10-CM

## 2024-08-16 PROCEDURE — 97112 NEUROMUSCULAR REEDUCATION: CPT

## 2024-08-16 PROCEDURE — 97110 THERAPEUTIC EXERCISES: CPT

## 2024-08-16 NOTE — PROGRESS NOTES
"Daily Note     Today's date: 2024  Patient name: Delaney Garza  : 1960  MRN: [id number]  Referring provider: Yuri Galvan PA*  Dx:   Encounter Diagnosis     ICD-10-CM    1. Arthritis of left hip  M16.12       2. Degenerative disc disease, lumbar  M51.36                      Subjective: Patient had \"horrible pain\" while at the beach this last week. Wasn't really able to do home exercises because of it, \"just the stomach one\".       Objective: See treatment diary below      Assessment: Patient reports c/o LBP and groin pain at start of session. Bike performed in attempt to decrease some of stiffness reported; little to no change. Attempted repeated standing extensions, however, pt unable to stating the back and hip won't let her move like that; completed in sitting w/ no change. Declined standing activities today. Remainder of session focused on core and hip strengthening in supine position. Did have some relief w/ LTR. MHP applied post treatment at pt request.       Plan: Continue per plan of care.      Precautions: PAD, HTN, Hypothyroidism   POC expires Unit limit Auth Expiration date PT/OT/ST + Visit Limit?   24 N/A  BOMN                           Visit/Unit Tracking  AUTH Status:  Date            18 visits Used 1 2 3 4            Remaining  17 16 15 14                Manuals                                                              Neuro Re-Ed             Sup TA activation HEP   5\"x20         Sup TA with marches HEP  2x10 ea. 2x10 ea         Sup TA with alt UE/LE             TB Multifidus press  RTB 20x3\" ea. RTB 20x3\" ea.          Supine Pball press with TA activation   20x5\" 20x5\"         Supine clamshell with TA activation   RTB 20x3\" RTB 20x3\"                      Ther Ex             Bike   5 min 6 min 5 min         L/S ext in stand against counter/table HEP   Unable, completes in sitting 2x30         Seated HS stretch    3x30\" B       " "  LTR    5\"x10 b/l         Mini squats HEP            Stand hip abd   2x10 ea. 2x10 ea.          Stand hip ext  2x10 ea. 2x10 ea.          Leg press  45# 3x10 45# 5x10          TB rows  RTB 2x10 RTB 2x10          TB lat pull downs  RTB 2x10 RTB 2x10          Ther Activity                                       Gait Training                                       Modalities             Mechanical traction  95# 15' static           MHP    8' seated post              "

## 2024-08-22 ENCOUNTER — APPOINTMENT (OUTPATIENT)
Dept: PHYSICAL THERAPY | Facility: CLINIC | Age: 64
End: 2024-08-22
Payer: COMMERCIAL

## 2024-08-29 ENCOUNTER — HOSPITAL ENCOUNTER (OUTPATIENT)
Dept: RADIOLOGY | Facility: CLINIC | Age: 64
End: 2024-08-29
Payer: COMMERCIAL

## 2024-08-29 ENCOUNTER — OFFICE VISIT (OUTPATIENT)
Dept: PHYSICAL THERAPY | Facility: CLINIC | Age: 64
End: 2024-08-29
Payer: COMMERCIAL

## 2024-08-29 VITALS
OXYGEN SATURATION: 94 % | HEART RATE: 71 BPM | DIASTOLIC BLOOD PRESSURE: 79 MMHG | SYSTOLIC BLOOD PRESSURE: 125 MMHG | RESPIRATION RATE: 20 BRPM

## 2024-08-29 DIAGNOSIS — M16.12 ARTHRITIS OF LEFT HIP: Primary | ICD-10-CM

## 2024-08-29 DIAGNOSIS — M51.36 DEGENERATIVE DISC DISEASE, LUMBAR: ICD-10-CM

## 2024-08-29 DIAGNOSIS — M16.12 ARTHRITIS OF LEFT HIP: ICD-10-CM

## 2024-08-29 PROCEDURE — 97530 THERAPEUTIC ACTIVITIES: CPT | Performed by: PHYSICAL THERAPIST

## 2024-08-29 PROCEDURE — 97110 THERAPEUTIC EXERCISES: CPT | Performed by: PHYSICAL THERAPIST

## 2024-08-29 PROCEDURE — 97112 NEUROMUSCULAR REEDUCATION: CPT | Performed by: PHYSICAL THERAPIST

## 2024-08-29 PROCEDURE — 20610 DRAIN/INJ JOINT/BURSA W/O US: CPT | Performed by: STUDENT IN AN ORGANIZED HEALTH CARE EDUCATION/TRAINING PROGRAM

## 2024-08-29 PROCEDURE — 77002 NEEDLE LOCALIZATION BY XRAY: CPT

## 2024-08-29 PROCEDURE — 77002 NEEDLE LOCALIZATION BY XRAY: CPT | Performed by: STUDENT IN AN ORGANIZED HEALTH CARE EDUCATION/TRAINING PROGRAM

## 2024-08-29 RX ORDER — METHYLPREDNISOLONE ACETATE 80 MG/ML
80 INJECTION, SUSPENSION INTRA-ARTICULAR; INTRALESIONAL; INTRAMUSCULAR; PARENTERAL; SOFT TISSUE ONCE
Status: COMPLETED | OUTPATIENT
Start: 2024-08-29 | End: 2024-08-29

## 2024-08-29 RX ORDER — ROPIVACAINE HYDROCHLORIDE 2 MG/ML
4 INJECTION, SOLUTION EPIDURAL; INFILTRATION; PERINEURAL ONCE
Status: COMPLETED | OUTPATIENT
Start: 2024-08-29 | End: 2024-08-29

## 2024-08-29 RX ADMIN — METHYLPREDNISOLONE ACETATE 80 MG: 80 INJECTION, SUSPENSION INTRA-ARTICULAR; INTRALESIONAL; INTRAMUSCULAR; PARENTERAL; SOFT TISSUE at 15:07

## 2024-08-29 RX ADMIN — IOHEXOL 1 ML: 300 INJECTION, SOLUTION INTRAVENOUS at 15:07

## 2024-08-29 RX ADMIN — ROPIVACAINE HYDROCHLORIDE 4 ML: 2 INJECTION, SOLUTION EPIDURAL; INFILTRATION; PERINEURAL at 15:07

## 2024-08-29 NOTE — PROGRESS NOTES
PT Re-Evaluation     Today's date: 2024  Patient name: Delaney Garza  : 1960  MRN: [id number]  Referring provider: Yuri Galvan PA*  Dx:   Encounter Diagnosis     ICD-10-CM    1. Arthritis of left hip  M16.12       2. Degenerative disc disease, lumbar  M51.36                      Assessment    Assessment details: Patient has been seen for a total of five treatment sessions since PT initial evaluation on 24.  Patient demonstrates mild improvement in ROM and strength, and improved functional ability since beginning PT treatment.  Patient continues to demonstrate moderate strength and ROM deficits of the left hip.  Patient would benefit from continued skilled PT treatment to address remaining deficits and to facilitate return to prior level of function.  Patient states she wishes to continue PT one time per week at this time.     Goals  Short term goals - to be achieved in 4 weeks:    Decrease pain 20-50% - partially met   Increase strength by 1/2 grade - partially met   Improve L/S ROM by 25% - partially met   Improve left hip ROM by 25% - partially met  Long term goals - to be achieved by discharge:    Performance in related household activities is improved to maximal level of function - partially met   IADL performance in related activities is improved to maximal level of function - partially met   Standing tolerance is improved to maximal level of function - partially met   Ambulation is improved to maximal level of function - partially met      Plan    Planned therapy interventions: manual therapy, neuromuscular re-education, patient/caregiver education, strengthening, stretching, therapeutic activities, therapeutic exercise and home exercise program    Frequency: 1x week  Duration in weeks: 6  Plan of Care beginning date: 2024  Plan of Care expiration date: 10/11/2024  Treatment plan discussed with: patient        Subjective Evaluation    History of Present Illness  Mechanism of  injury: Patient states she twisted her right knee and ankle when ascending steps in her home approximately two weeks previous.  Patient states she also fell in her driveway the same week that she twisted her knee and ankle on the stairs.  Patient states decreased pain since sustaining the falls, states she doesn't feel that she sustained any significant injuries from either fall.    Pain  Current pain ratin  At best pain ratin  At worst pain ratin          Objective     Functional Assessment        Comments  Neurological Testing      Sensation      Lumbar   Left   Diminished: light touch     Right   Intact: light touch     Comments   Left light touch: Decreased L4 + L5     Active Range of Motion:    Lumbar:  Flexion:  Restriction level: minimal  Extension:  Restriction level: maximal  Left lateral flexion:  Restriction level: minimal  Right lateral flexion:  Restriction level: minimal    Left Hip:  Flexion: 95 degrees   Abduction: 25 degrees   External rotation (90/90): 40 degrees   Internal rotation (90/90): 20 degrees      Passive Range of Motion   Left Hip   Flexion: 100 degrees   Abduction: 30 degrees   External rotation (90/90): 45 degrees   Internal rotation (90/90): 25 degrees      Strength/Myotome Testing      Left Hip   Planes of Motion   Flexion: 3-  Extension: 3+  Abduction: 3-  Adduction: 4-     Right Hip   Planes of Motion   Flexion: 4  Extension: 4  Abduction: 4  Adduction: 4           Precautions: PAD, HTN, Hypothyroidism   POC expires Unit limit Auth Expiration date PT/OT/ST + Visit Limit?   24 N/A   BOMN                                           Visit/Unit Tracking  AUTH Status:  Date 7/17 7/25 7/31 8/16  8/29                 18 visits Used 1 2 3 4  5                   Remaining  17 16 15 14  13                       Manuals                                                              Re-eval          KK, PT                                     Neuro Re-Ed     "                   Sup TA activation HEP     5\"x20               Sup TA with marches HEP   2x10 ea. 2x10 ea  2x10 ea             Sup TA with alt UE/LE                       TB Multifidus press   RTB 20x3\" ea. RTB 20x3\" ea.   GTB 20x3\" ea.             Supine Pball press with TA activation     20x5\" 20x5\"  20x5\"             Supine clamshell with TA activation     RTB 20x3\" RTB 20x3\"                                       Ther Ex                       Bike    5 min 6 min 5 min  decl.             L/S ext in stand against counter/table HEP     Unable, completes in sitting 2x30               Seated HS stretch       3x30\" B               LTR       5\"x10 b/l               Mini squats HEP                     Stand hip abd    2x10 ea. 2x10 ea.    2x10 ea.             Stand hip ext   2x10 ea. 2x10 ea.                 Leg press   45# 3x10 45# 5x10    65# 3x10             Leg press HR     65# 3x10        TB rows   RTB 2x10 RTB 2x10                 TB lat pull downs   RTB 2x10 RTB 2x10                 Single knee to chest stretch w/strap     Left 10x10\"        Stand SLR flex     Left 2x10                     Ther Activity                                                                       Gait Training                                                                       Modalities                       Mechanical traction   95# 15' static                   MHP       8' seated post                  "

## 2024-08-29 NOTE — DISCHARGE INSTR - LAB

## 2024-08-29 NOTE — H&P
History of Present Illness: The patient is a 64 y.o. female who presents with complaints of left hip pain    Past Medical History:   Diagnosis Date    Abnormal ECG     Abnormal Pap smear of cervix     Denial     Heart aneurysm     Heart murmur     Hypertension     Hypothyroidism     Infectious viral hepatitis     hep c    Obesity (BMI 30-39.9) 2023    Overweight 2019    PAD (peripheral artery disease) (HCC)        Past Surgical History:   Procedure Laterality Date    AUGMENTATION MAMMAPLASTY       SECTION      COLONOSCOPY  2021    ENDOSCOPY OF POUCH  2021         Current Outpatient Medications:     albuterol (PROVENTIL HFA,VENTOLIN HFA) 90 mcg/act inhaler, INHALE 1 PUFF BY MOUTH EVERY 6 HOURS AS NEEDED FOR WHEEZE, Disp: 8 g, Rfl: 3    ammonium lactate (LAC-HYDRIN) 12 % lotion, APPLY TOPICALLY 2 TIMES A DAY AS NEEDED FOR DRY SKIN., Disp: 400 mL, Rfl: 0    aspirin 81 MG tablet, Take 1 tablet by mouth daily, Disp: , Rfl:     atorvastatin (LIPITOR) 20 mg tablet, TAKE 1 TABLET BY MOUTH EVERY DAY, Disp: 90 tablet, Rfl: 2    buprenorphine (SUBUTEX) 8 mg, Place 8 mg under the tongue 2 (two) times a day Do not start before 2024., Disp: , Rfl:     DULoxetine (CYMBALTA) 60 mg delayed release capsule, TAKE 1 CAPSULE BY MOUTH EVERY DAY, Disp: 90 capsule, Rfl: 0    gabapentin (NEURONTIN) 300 mg capsule, Take 2 capsules (600 mg total) by mouth 3 (three) times a day, Disp: 100 capsule, Rfl: 2    ibuprofen (MOTRIN) 800 mg tablet, Take 800 mg by mouth every 8 (eight) hours as needed for mild pain or moderate pain, Disp: , Rfl:     Klor-Con M20 20 MEQ tablet, TAKE 1 TABLET BY MOUTH EVERY DAY (Patient not taking: Reported on 2024), Disp: 90 tablet, Rfl: 2    levothyroxine 175 mcg tablet, TAKE 1 TABLET BY MOUTH EVERY DAY, Disp: 90 tablet, Rfl: 1    lisinopril (ZESTRIL) 10 mg tablet, TAKE 1 TABLET BY MOUTH EVERY DAY, Disp: 90 tablet, Rfl: 1    naloxone (NARCAN) 4 mg/0.1 mL nasal spray, 4 mg into each  nostril (Patient not taking: Reported on 4/9/2024), Disp: , Rfl:     pantoprazole (PROTONIX) 40 mg tablet, TAKE 1 TABLET BY MOUTH EVERY DAY, Disp: 90 tablet, Rfl: 1    pseudoephedrine (SUDAFED) 30 mg tablet, Take 60 mg by mouth every 4 (four) hours as needed for congestion, Disp: , Rfl:     SUBOXONE 8-2 MG, USE 2 & 1/2 FILMS UNDER TONGUE DAILY, Disp: , Rfl: 0    tiZANidine (ZANAFLEX) 4 mg tablet, Take 4 mg by mouth every 8 (eight) hours as needed, Disp: , Rfl:     torsemide (DEMADEX) 20 mg tablet, TAKE 1 TABLET BY MOUTH EVERY DAY, Disp: 90 tablet, Rfl: 2    Allergies   Allergen Reactions    Penicillins Itching    Motrin [Ibuprofen] Blisters    Meloxicam Other (See Comments)     Face turned red and was heated for hours        Physical Exam: There were no vitals filed for this visit.  General: Awake, Alert, Oriented x 3, Mood and affect appropriate  Respiratory: Respirations even and unlabored  Cardiovascular: Peripheral pulses intact; no edema  Musculoskeletal Exam: pain with left hip flexion    ASA Score: 3         Assessment:   1. Arthritis of left hip        Plan: left hip osteoarthritis, FL guided left hip IA CSI

## 2024-09-10 ENCOUNTER — OFFICE VISIT (OUTPATIENT)
Dept: OBGYN CLINIC | Facility: CLINIC | Age: 64
End: 2024-09-10
Payer: COMMERCIAL

## 2024-09-10 ENCOUNTER — APPOINTMENT (OUTPATIENT)
Dept: RADIOLOGY | Facility: CLINIC | Age: 64
End: 2024-09-10
Payer: COMMERCIAL

## 2024-09-10 VITALS
WEIGHT: 198.2 LBS | DIASTOLIC BLOOD PRESSURE: 85 MMHG | HEART RATE: 81 BPM | SYSTOLIC BLOOD PRESSURE: 132 MMHG | BODY MASS INDEX: 33.02 KG/M2 | HEIGHT: 65 IN

## 2024-09-10 DIAGNOSIS — M17.11 PRIMARY OSTEOARTHRITIS OF RIGHT KNEE: ICD-10-CM

## 2024-09-10 DIAGNOSIS — M25.561 ACUTE PAIN OF RIGHT KNEE: ICD-10-CM

## 2024-09-10 DIAGNOSIS — M16.12 ARTHRITIS OF LEFT HIP: Primary | ICD-10-CM

## 2024-09-10 PROCEDURE — 99214 OFFICE O/P EST MOD 30 MIN: CPT | Performed by: ORTHOPAEDIC SURGERY

## 2024-09-10 PROCEDURE — 73562 X-RAY EXAM OF KNEE 3: CPT

## 2024-09-10 PROCEDURE — 20610 DRAIN/INJ JOINT/BURSA W/O US: CPT | Performed by: ORTHOPAEDIC SURGERY

## 2024-09-10 RX ORDER — METHYLPREDNISOLONE ACETATE 40 MG/ML
2 INJECTION, SUSPENSION INTRA-ARTICULAR; INTRALESIONAL; INTRAMUSCULAR; SOFT TISSUE
Status: COMPLETED | OUTPATIENT
Start: 2024-09-10 | End: 2024-09-10

## 2024-09-10 RX ORDER — BUPIVACAINE HYDROCHLORIDE 2.5 MG/ML
2 INJECTION, SOLUTION INFILTRATION; PERINEURAL
Status: COMPLETED | OUTPATIENT
Start: 2024-09-10 | End: 2024-09-10

## 2024-09-10 RX ADMIN — BUPIVACAINE HYDROCHLORIDE 2 ML: 2.5 INJECTION, SOLUTION INFILTRATION; PERINEURAL at 11:00

## 2024-09-10 RX ADMIN — METHYLPREDNISOLONE ACETATE 2 ML: 40 INJECTION, SUSPENSION INTRA-ARTICULAR; INTRALESIONAL; INTRAMUSCULAR; SOFT TISSUE at 11:00

## 2024-09-10 NOTE — PROGRESS NOTES
Orthopaedics Office Visit - Established Patient Visit    ASSESSMENT/PLAN:    Assessment:   Right knee osteoarthritis - moderate patellofemoral, mild tricompartmental - chronic -exacerbated in recent months  Left hip osteoarthritis  - chronic - stable, improved  Lumbar degeneration and radiculopathy - chronic, stable  Weakness  to LLE, mildly improved with PT  Urinary incontinence (does not appear to be resulting from any cauda equina/lumbar compression on most recent MRI)   1 PPD smoker - trying to reduce but still smoking    Plan:   X-rays reviewed and discussed with patient  Patient to be full weightbearing to BLE (Bilateral Lower Extremity)  ROM as tolerated to  BLE (Bilateral Lower Extremity)  Discussed with patient treatment options moving forward including bracing, over the counter analgesics, physical therapy, corticosteroid injection.  Patient to continue physical therapy for strength and mobility training, referral provided    Patient to continue at home analgesic regimen with Tylenol   Patient to follow up in 3 months for all issues      To Do Next Visit:  Re-evaluation, possible IVETT if pain returns and quits smoking  Possible repeat knee CSI    _____________________________________________________  CHIEF COMPLAINT:  Chief Complaint   Patient presents with    Left Hip - Follow-up         SUBJECTIVE:  Delaney Garza is a 64 y.o. female who presents to the office for a follow up regarding her left hip osteoarthritis status post left hip intraarticular hip injection performed on 8/29/2024. The patient had relief in symptoms with the corticosteroid injection to the left hip.     She does however note she has an increasing right knee pain from compensating for the left hip. She reports she twisted it twice. Pain is waking her at nighttime. She has constant pain. Pain is managed with 800 mg ibuprofen as needed and is prescribed buprenorphine 8 mg twice per day and has tried Voltaren gel without symptom  relief. The patient has occasional episodes of instability. She does have shooting pains into the distal shin that have been present for roughly 4 weeks when she twisted her knee. She also has swelling into her knee. Pain is localized to the anterior knee. The patient has been attending physical therapy and asked to avoid lower extremity strengthening recently.     PAST MEDICAL HISTORY:  Past Medical History:   Diagnosis Date    Abnormal ECG     Abnormal Pap smear of cervix     Denial     Heart aneurysm     Heart murmur     Hypertension     Hypothyroidism     Infectious viral hepatitis     hep c    Obesity (BMI 30-39.9) 2023    Overweight 2019    PAD (peripheral artery disease) (HCC)        PAST SURGICAL HISTORY:  Past Surgical History:   Procedure Laterality Date    AUGMENTATION MAMMAPLASTY       SECTION      COLONOSCOPY  2021    ENDOSCOPY OF POUCH  2021       FAMILY HISTORY:  Family History   Problem Relation Age of Onset    Other Mother         Denial    Vaginal cancer Mother 78    Coronary artery disease Father     Other Father         Denial    Heart disease Brother     Celiac disease Son     Celiac disease Son     Breast cancer Maternal Aunt        SOCIAL HISTORY:  Social History     Tobacco Use    Smoking status: Every Day     Current packs/day: 1.00     Average packs/day: 1 pack/day for 20.0 years (20.0 ttl pk-yrs)     Types: Cigarettes    Smokeless tobacco: Never   Vaping Use    Vaping status: Former    Substances: Nicotine   Substance Use Topics    Alcohol use: Yes     Alcohol/week: 2.0 standard drinks of alcohol     Types: 1 Glasses of wine, 1 Cans of beer per week    Drug use: No       MEDICATIONS:    Current Outpatient Medications:     albuterol (PROVENTIL HFA,VENTOLIN HFA) 90 mcg/act inhaler, INHALE 1 PUFF BY MOUTH EVERY 6 HOURS AS NEEDED FOR WHEEZE, Disp: 8 g, Rfl: 3    ammonium lactate (LAC-HYDRIN) 12 % lotion, APPLY TOPICALLY 2 TIMES A DAY AS NEEDED FOR DRY SKIN., Disp: 400  mL, Rfl: 0    aspirin 81 MG tablet, Take 1 tablet by mouth daily, Disp: , Rfl:     atorvastatin (LIPITOR) 20 mg tablet, TAKE 1 TABLET BY MOUTH EVERY DAY, Disp: 90 tablet, Rfl: 2    buprenorphine (SUBUTEX) 8 mg, Place 8 mg under the tongue 2 (two) times a day Do not start before July 31, 2024., Disp: , Rfl:     gabapentin (NEURONTIN) 300 mg capsule, Take 2 capsules (600 mg total) by mouth 3 (three) times a day, Disp: 100 capsule, Rfl: 2    ibuprofen (MOTRIN) 800 mg tablet, Take 800 mg by mouth every 8 (eight) hours as needed for mild pain or moderate pain, Disp: , Rfl:     levothyroxine 175 mcg tablet, TAKE 1 TABLET BY MOUTH EVERY DAY, Disp: 90 tablet, Rfl: 1    lisinopril (ZESTRIL) 10 mg tablet, TAKE 1 TABLET BY MOUTH EVERY DAY, Disp: 90 tablet, Rfl: 1    pantoprazole (PROTONIX) 40 mg tablet, TAKE 1 TABLET BY MOUTH EVERY DAY, Disp: 90 tablet, Rfl: 1    pseudoephedrine (SUDAFED) 30 mg tablet, Take 60 mg by mouth every 4 (four) hours as needed for congestion, Disp: , Rfl:     SUBOXONE 8-2 MG, USE 2 & 1/2 FILMS UNDER TONGUE DAILY, Disp: , Rfl: 0    tiZANidine (ZANAFLEX) 4 mg tablet, Take 4 mg by mouth every 8 (eight) hours as needed, Disp: , Rfl:     torsemide (DEMADEX) 20 mg tablet, TAKE 1 TABLET BY MOUTH EVERY DAY, Disp: 90 tablet, Rfl: 2    DULoxetine (CYMBALTA) 60 mg delayed release capsule, TAKE 1 CAPSULE BY MOUTH EVERY DAY, Disp: 90 capsule, Rfl: 0    Klor-Con M20 20 MEQ tablet, TAKE 1 TABLET BY MOUTH EVERY DAY (Patient not taking: Reported on 4/23/2024), Disp: 90 tablet, Rfl: 2    naloxone (NARCAN) 4 mg/0.1 mL nasal spray, 4 mg into each nostril (Patient not taking: Reported on 4/9/2024), Disp: , Rfl:     ALLERGIES:  Allergies   Allergen Reactions    Penicillins Itching    Motrin [Ibuprofen] Blisters    Meloxicam Other (See Comments)     Face turned red and was heated for hours        REVIEW OF SYSTEMS:  MSK: as noted in HPI  Neuro: WNL's  Pertinent items are otherwise noted in HPI.  A comprehensive review of  "systems was otherwise negative.    LABS:  HgA1c:   Lab Results   Component Value Date    HGBA1C 6.1 (H) 07/10/2024     BMP:   Lab Results   Component Value Date    CALCIUM 9.4 07/10/2024    K 3.7 07/10/2024    CO2 32 (H) 07/10/2024    CL 99 (L) 07/10/2024    BUN 16 07/10/2024    CREATININE 0.64 07/10/2024     CBC: No components found for: \"CBC\"    _____________________________________________________  PHYSICAL EXAMINATION:  Vital signs: /85   Pulse 81   Ht 5' 5\" (1.651 m)   Wt 89.9 kg (198 lb 3.2 oz)   BMI 32.98 kg/m²   General: No acute distress, awake and alert  Psychiatric: Mood and affect appear appropriate  HEENT: Trachea Midline, No torticollis, no apparent facial trauma  Cardiovascular: No audible murmurs; Extremities appear perfused  Pulmonary: No audible wheezing or stridor  Skin: No open lesions; see further details (if any) below    MUSCULOSKELETAL EXAMINATION:    Extremities:    Left Hip:  Minimal pain with flexion/extension  Extremity perfused  NV intact    Right Knee  Alignment neutral  There is no effusion.    There is soft tissue swelling.   There is tenderness over the medial joint line.    Range of motion from 0 to 95.    There is crepitus with range of motion.   The patient is able to perform a straight leg raise.    Stable to valgus and varus stress.   No ligament laxity   Ajay's testing negative  Toes are pink and mobile  Compartments are soft  Brisk capillary refill  Sensation intact  The patient is neurovascular intact distally.      _____________________________________________________  STUDIES REVIEWED:  I personally reviewed the images obtained in office today and my independent interpretation is as follows:     X-rays taken 9/10/2024 of right knee independently reviewed and demonstrate moderate degenerative changes of the patellofemoral joint. Mild tricompartmental degenerative changes with early osteophyte formation. No acute fracture.      PROCEDURES PERFORMED:  Large joint " "arthrocentesis: R knee  Universal Protocol:  Consent: Verbal consent obtained.  Risks and benefits: risks, benefits and alternatives were discussed  Consent given by: patient  Time out: Immediately prior to procedure a \"time out\" was called to verify the correct patient, procedure, equipment, support staff and site/side marked as required.  Patient understanding: patient states understanding of the procedure being performed  Site marked: the operative site was marked  Patient identity confirmed: verbally with patient  Supporting Documentation  Indications: pain   Procedure Details  Location: knee - R knee  Preparation: Patient was prepped and draped in the usual sterile fashion  Needle size: 22 G  Ultrasound guidance: no  Approach: anterolateral  Medications administered: 2 mL bupivacaine 0.25 %; 2 mL methylPREDNISolone acetate 40 mg/mL    Patient tolerance: patient tolerated the procedure well with no immediate complications  Dressing:  Sterile dressing applied            Scribe Attestation      I,:  Vickie Hardwick am acting as a scribe while in the presence of the attending physician.:       I,:  Albert Torres MD personally performed the services described in this documentation    as scribed in my presence.:           "

## 2024-09-10 NOTE — PATIENT INSTRUCTIONS
"Orthopaedic Surgery and Smoking  Cigarette smoking is recognized as one of the major causes of preventable disease.    Most people know that smoking is linked to heart and lung diseases, as well as to several cancers. However, you may not be aware that smoking also has a serious negative effect on bones, muscles, tendons, and joints, and that smoking often leads to poorer outcomes from orthopaedic surgery.    Studies have shown that:  The nicotine in cigarettes and chewing tobacco can cause a decrease in blood flow to areas of bone and tendon that are healing after surgery.  Smoking has a negative effect on fracture and wound healing after surgery.  Fractures (broken bones) take longer to heal in smokers because of the harmful effects of nicotine on the production of bone-forming cells.    Smokers (including those who vape) also have a higher rate of complications after surgery than nonsmokers -- in fact, smoking may be the single most important factor in complications after surgery.    The most common complications caused by smoking include:  Poor wound healing  Infection  Poor final outcomes of surgery    Researchers have noted that patients who quit smoking have improved outcomes for surgical treatments of musculoskeletal conditions and injuries. This means  smoking is a modifiable risk factor (one you can change) that can be addressed before surgery to give you the best possible outcome.    Research on Smoking and Orthopaedic Procedures  For several specific types of surgeries, results were significantly better for people who never smoked and/or for those who stopped smoking than for smokers.    Smoking and Spinal Fusion Surgery  Spinal fusion is often used to treat disk disorders in the neck and the lower back. Two or more of the small bones in the spinal column (vertebrae) are \"welded\" together with bone grafts and internal devices, such as metal rods.    A successful spinal fusion can reduce pain and improve " the patient's ability to perform activities of daily living. However, the success of the surgery depends on how well the bones heal into a solid unit.    In a study on spinal fusions in the lower back:  The success rate was 80% to 85% for patients who never smoked or who quit smoking after their surgery. The success rate dropped to under 73% for smokers.  More than 70% of nonsmokers and previous smokers were able to return to work after surgery. But only about half of the smokers were able to resume working.    Another study on spinal fusions in the neck showed successful fusion in 81% of nonsmokers, but in only 62% of smokers.    Smoking and Rotator Cuff Surgery  Smoking also has a negative impact on surgeries that focus on tendons healing to bone, such as rotator cuff repair.    One study compared the results of 235 patients treated at two different medical institutions. Results in nonsmokers were significantly better than in smokers.  Nonsmokers experienced less pain and a higher degree of function after surgery than smokers.  Good or excellent results were seen in 84% of nonsmokers, but in only 35% of smokers.    Evidence like this continues to indicate that smoking is harmful to your bones and tendons.    Smoking and Total Hip and Knee Replacement  A 2019 cohort study reports that smokers have:  An increased risk of complications after total hip and total knee replacement  Increased opioid use after total hip replacement compared to non-smokers and ex-smokers  A higher risk of hospital readmission after total hip replacement than non-smokers  A higher 1-year mortality (death) rate after total hip and total knee replacement than nonsmokers    There is also some evidence that quitting or even cutting back on smoking before total hip or knee replacement surgery can reduce post-surgery complications.    Learn more about these procedures, including other factors that can increase the risk of poor outcomes: Total Hip  "Replacement, Total Knee Replacement    Quit Smoking Now  You have a better chance for a successful outcome after orthopaedic surgery if you are a nonsmoker or if you have stopped smoking, according to researchers. Even if you vape, the recommendation is to stop to improve your chances of success and minimize risk around the time of surgery.    Before you plan your orthopaedic surgery, be sure to talk to your surgeon about your tobacco use. Find out about support programs to help you quit. There are many low-cost smoking cessation programs available. The American [employer] Association is a great place to start: American [employer] Association.    Arthritis of the Knee    Arthritis is inflammation of one or more of your joints. Pain, swelling, and stiffness are the primary symptoms of arthritis. Any joint in the body may be affected by the disease, but it is particularly common in the knee.    Knee arthritis can make it hard to do many everyday activities, such as walking or climbing stairs. It is a major cause of lost work time and a serious disability for many people.    The most common types of arthritis are osteoarthritis and rheumatoid arthritis, but there are more than 100 different forms. While arthritis is mainly an adult disease, some forms affect children.    Although there is no cure for arthritis, there are many treatment options available to help manage pain and keep people staying active.    Anatomy  The knee is the largest and strongest joint in your body. It is made up of the lower end of the femur (thighbone), the upper end of the tibia (shinbone), and the patella (kneecap). The ends of the three bones that form the knee joint are covered with articular cartilage, a smooth, slippery substance that protects and cushions the bones as you bend and straighten your knee.    Two wedge-shaped pieces of cartilage called meniscus act as \"shock absorbers\" between your thighbone and shinbone. They are tough and rubbery to " help cushion the joint and keep it stable.    The knee joint is surrounded by a thin lining called the synovial membrane. This membrane releases a fluid that lubricates the cartilage and reduces friction.      Normal knee anatomy. The knee is made up of bones, cartilage, ligaments, and tendons.     Description  The major types of arthritis that affect the knee are osteoarthritis, rheumatoid arthritis, and posttraumatic arthritis.    Osteoarthritis  Osteoarthritis is the most common form of arthritis in the knee. It is a degenerative, wear-and-tear type of arthritis that occurs most often in people 50 years of age and older, although it may occur in younger people, too.    In osteoarthritis, the cartilage in the knee joint gradually wears away. As the cartilage wears away, it becomes frayed and rough, and the protective space between the bones decreases. This can result in bone rubbing on bone and produce painful bone spurs.    Osteoarthritis usually develops slowly, and the pain it causes worsens over time.    Osteoarthritis often results in bone rubbing on bone. Bone spurs are a common feature of this form of arthritis.     Watch: Osteoarthritis of the Knee Animation    Rheumatoid Arthritis  Rheumatoid arthritis is a chronic disease that attacks multiple joints throughout the body, including the knee joint. It is symmetrical, meaning that it usually affects the same joint on both sides of the body.  In rheumatoid arthritis, the synovial membrane that covers the knee joint begins to swell. This results in knee pain and stiffness.    Rheumatoid arthritis is an autoimmune disease. This means that the immune system attacks its own tissues. The immune system damages normal tissue (such as cartilage and ligaments) and softens the bone.    Posttraumatic Arthritis  Posttraumatic arthritis is form of arthritis that develops after an injury to the knee. For example, a broken bone may damage the joint surface and lead to  arthritis years after the injury. Meniscal tears and ligament injuries can cause instability and additional wear on the knee joint which, over time, can result in arthritis.    Symptoms  A knee joint affected by arthritis may be painful and inflamed. Generally, the pain develops gradually over time, although sudden onset is also possible. There are other symptoms, as well:  The joint may become stiff and swollen, making it difficult to bend and straighten the knee.  Pain and swelling may be worse in the morning, or after sitting or resting.  Vigorous activity may cause pain to flare up.  Loose fragments of cartilage and other tissue can interfere with the smooth motion of joints. The knee may lock or stick during movement. It may creak, click, snap, or make a grinding noise (crepitus).  Pain may cause a feeling of weakness or buckling in the knee.  Many people with arthritis note increased joint pain with changes in the weather.    Doctor Examination  During your appointment, your doctor will talk with you about your symptoms and medical history, conduct a physical examination, and possibly order diagnostic tests, such as X-rays or blood tests.    Physical Examination  During the physical examination, your doctor will look for:  Joint swelling, warmth, or redness  Tenderness around the knee  Range of passive (assisted) and active (self-directed) motion  Instability of the joint  Crepitus (a grating sensation inside the joint) with movement  Pain when weight is placed on the knee  Problems with your gait (the way you walk)  Any signs of injury to the muscles, tendons, and ligaments surrounding the knee  Involvement of other joints (an indication of rheumatoid arthritis)    Imaging Tests  X-rays. These imaging tests provide detailed pictures of dense structures, such as bone. They can help distinguish among various forms of arthritis. X-rays of an arthritic knee may show a narrowing of the joint space, changes in the  bone, and the formation of bone spurs (osteophytes).  Other tests. Occasionally, a magnetic resonance imaging (MRI) scan or a computerized tomography (CT) scan may be needed to determine the condition of the bone and soft tissues of your knee.      (Left) In this X-ray of a normal knee, the space between the bones indicates healthy cartilage (arrows). (Right) This X-ray of an arthritic knee shows severe loss of joint space.     Laboratory Tests  Your doctor may also recommend blood tests to determine which type of arthritis you have. With some types of arthritis, including rheumatoid arthritis, blood tests will help with a proper diagnosis.    Related Articles  TREATMENT  Total Knee Replacement  TREATMENT  Platelet-Rich Plasma (PRP)  RECOVERY  Activities After Total Knee Replacement  TREATMENT  Viscosupplementation Treatment for Knee Arthritis    Treatment  There is no cure for arthritis but there are a number of treatments that may help relieve the pain and disability it can cause.    Nonsurgical Treatment  As with other arthritic conditions, initial treatment of arthritis of the knee is nonsurgical. Your doctor may recommend a range of treatment options.    Lifestyle modifications. Some changes in your daily life can protect your knee joint and slow the progress of arthritis.  Minimize activities that aggravate the condition, such as climbing stairs.  Exercise is recommended for osteoarthritis to improve pain and function. Switching from high-impact activities (like jogging or tennis) to lower impact activities (like swimming or cycling) will enable you to be active while putting less stress on your knee. Balance, agility, and coordination exercises, combined with traditional exercise, may help to improve function and walking speed.  Losing weight can reduce stress on the knee joint, resulting in less pain and increased function.    Physical therapy. Specific exercises can help increase range of motion and  "flexibility, as well as help strengthen the muscles in your leg. Your doctor or a physical therapist can help develop an individualized exercise program that meets your needs and lifestyle.    Assistive devices. Using devices such as a cane, or wearing a brace or knee sleeve can be helpful. A brace assists with stability and function, and it may be especially helpful if the arthritis is centered on one side of the knee. There are two types of braces that are often used for knee arthritis: An \"\" brace shifts weight away from the affected portion of the knee, while a \"support\" brace helps support the entire knee load. Placing wedges in your shoe is not recommended for relieving knee discomfort.    Other remedies. Applying heat or ice, or wearing elastic bandages to provide support to the knee may provide some relief from pain.    Medications. Several types of drugs are useful in treating arthritis of the knee. Because people respond differently to medications, your doctor will work closely with you to determine the medications and dosages that are safe and effective for you.  Over-the-counter, non-narcotic pain relievers and anti-inflammatory medications are usually the first choice of therapy for arthritis of the knee. Like all medications, over-the-counter pain relievers can cause side effects and interact with other medications you are taking. Be sure to discuss potential side effects with your doctor.  Another type of pain reliever is a nonsteroidal anti-inflammatory drug, or NSAID (pronounced \"en-said\"). NSAID's, such as ibuprofen and naproxen, are available both over-the-counter and by prescription and in oral and topical (gel) forms. Oral NSAID's are recommended to improve pain and function in people with knee osteoarthritis. However, NSAID's should be used with caution, or avoided, in people with certain health conditions, such as coronary artery disease, congestive heart failure, and chronic kidney " disease. Talk to your doctor about whether NSAID's are right for you. Acetaminophen is a simple, over-the-counter pain reliever that can be effective in reducing arthritis pain, especially for people who cannot tolerate traditional NSAID's.   A BOCANEGRA-2 inhibitor is a special type of NSAID that may cause fewer gastrointestinal side effects. Common brand names of BOCANEGRA-2 inhibitors include Celebrex (celecoxib) and Mobic (Meloxicam, which is a partial BOCANEGRA-2 inhibitor). A BOCANEGRA-2 inhibitor reduces pain and inflammation so that you can function better. If you are taking a BOCANEGRA-2 inhibitor, you should not use a traditional NSAID (prescription or over-the-counter). Be sure to tell your doctor if you have had a heart attack, stroke, angina, blood clot, hypertension, or if you are sensitive to aspirin, sulfa drugs, or other NSAID's.  Topical NSAID's are also available for the treatment of knee pain. There is a strong recommendation to try this type of treatment for pain control, especially if you are unable to tolerate oral NSAID's.  The use of oral narcotic medications, including opioids, should be avoided. This class of medications is not effective for the treatment of pain due to knee arthritis.  Corticosteroids (also known as cortisone) are powerful anti-inflammatory agents that can be injected into the joint. These injections can provide pain relief and reduce inflammation; however, the effects do not last indefinitely. Your doctor may recommend limiting the number of injections to three or four per year, per joint, due to possible side effects. In some cases, pain and swelling may flare immediately after the injection, and the potential exists for long-term joint damage or infection. With frequent repeated injections, or injections over an extended period of time, joint damage can actually increase rather than decrease.  Disease-modifying anti-rheumatic drugs (DMARDs) are used to slow the progression of rheumatoid arthritis.  Drugs like methotrexate, sulfasalazine, and hydroxychloroquine are commonly prescribed. In addition, biologic DMARDs like etanercept (Enbrel) and adalimumab (Humira) may reduce the body's overactive immune response. Because there are many different drugs today for rheumatoid arthritis, a rheumatology specialist is often required to effectively manage medications.  Glucosamine and chondroitin sulfate, substances found naturally in joint cartilage, can be taken as dietary supplements. Although patient reports indicate that these supplements may relieve pain, there is no evidence to support the use of glucosamine and chondroitin sulfate to decrease or reverse the progression of arthritis. In addition, the [place] Food and Drug Administration does not test dietary supplements before they are sold to consumers. These compounds may cause side effects, as well as negative interactions with other medications. Always consult your doctor before taking dietary supplements.    Alternative therapies. Many alternative forms of therapy are unproven, but may be helpful to try, provided you find a qualified practitioner and keep your doctor informed of your decision. Alternative therapies to treat pain include the use of acupuncture, magnetic pulse therapy, platelet-rich plasma, and stem cell injections.    Acupuncture uses fine needles to stimulate specific body areas to relieve pain or temporarily numb an area. Although it is used in many parts of the world and evidence suggests that it can help ease the pain of arthritis, there are few scientific studies of its effectiveness. Be sure your acupuncturist is certified, and do not hesitate to ask about his or her sterilization practices.    Magnetic pulse therapy is painless and works by applying a pulsed signal to the knee, which is placed in an electromagnetic field. Like many alternative therapies, magnetic pulse therapy has yet to be proven.  Biologic treatments such as  platelet-rich plasma (PRP) and stem cell injections involve taking cells from your own body and re-injecting them into a painful joint.    PRP uses a component of your own blood, platelets, that have been  from your blood, concentrated, and injected into your knee. The platelets contain “growth factors” thought to be helpful in reducing the symptoms of inflammation.  Stem cells are precursor cells that can also be taken from your own body and injected into your knee. Since they are basic cells, they may have potential to grow into new tissue and thus heal damaged joint surfaces.    While both treatments show promise, clinical studies have yet to confirm their value in treating osteoarthritis.    Surgical Treatment  Your doctor may recommend surgery if your pain from arthritis causes disability and is not relieved with nonsurgical treatment. As with all surgeries, there are some risks and possible complications with different knee procedures. Your doctor will discuss the possible complications with you before your operation.    Arthroscopy. During arthroscopy, doctors use small incisions and thin instruments to diagnose and treat joint problems.    Arthroscopic surgery is not often used to treat arthritis of the knee. In cases where osteoarthritis is accompanied by a degenerative meniscal tear, arthroscopic surgery may be recommended to treat the torn meniscus.    Cartilage grafting (cartilage restoration). Normal, healthy cartilage tissue may be taken from another part of the knee or from a tissue bank to fill a hole in the articular cartilage. This procedure is typically considered only for younger patients who have small areas of cartilage damage.    Synovectomy. The joint lining damaged by rheumatoid arthritis is removed to reduce pain and swelling.    Osteotomy. In a knee osteotomy, either the tibia (shinbone) or femur (thighbone) is cut and then reshaped to relieve pressure on the knee joint. Knee  "osteotomy is used when you have early-stage osteoarthritis that has damaged just one side of the knee joint. By shifting your weight off the damaged side of the joint, an osteotomy can relieve pain and significantly improve function in your arthritic knee.    Total knee replacement or partial (unicompartmental) knee replacement (arthroplasty). Your doctor will remove the damaged cartilage and bone, and then position new metal or plastic joint surfaces to restore the function of your knee.      (Left) A partial knee replacement is an option when damage is limited to just one part of the knee. (Right) A total knee replacement prosthesis.     Recovery  After any type of surgery for arthritis of the knee, there is a period of recovery. Recovery time and rehabilitation depends on the type of surgery performed.    Your doctor may recommend physical therapy to help you regain strength in your knee and to restore range of motion. Depending upon your procedure, you may need to wear a knee brace, or use crutches or a cane for a time.    In most cases, surgery relieves pain and makes it possible to perform daily activities more easily.    To assist doctors in the nonsurgical management of knee osteoarthritis, the American Academy of Orth[employer] has conducted research to provide some useful guidelines. These are recommendations only and may not apply to every case. For more information: Plain Language Summary - Clinical Practice Guideline - Management of Knee Osteoarthritis        Osteoarthritis of the Hip    Osteoarthritis, sometimes called \"wear-and-tear arthritis,\" is a common condition that many people develop as they age. It can occur in any joint in the body, but most often develops in weight-bearing joints, such as the hip.    Osteoarthritis of the hip causes pain and stiffness. It can make it hard to do everyday activities like bending over to tie a shoe, rising from a chair, or taking a short walk.    Because " osteoarthritis gradually worsens over time, the sooner you start treatment, the more likely it is that you can lessen its impact on your life. Although there is no cure for osteoarthritis, there are many treatment options to help you manage pain and stay active.    Anatomy  The hip is one of the body's largest joints. It is a ball-and-socket joint. The socket is formed by the acetabulum, which is part of the large pelvis bone. The ball is the femoral head, which is the upper end of the femur (thighbone).    The bone surfaces of the ball and socket are covered with articular cartilage, a smooth, slippery substance that protects and cushions the bones and enables them to move easily.     The surface of the joint is covered by a thin lining called the synovium. In a healthy hip, the synovium produces a small amount of fluid that lubricates the cartilage and aids in movement.    The normal anatomy of the hip.     Description  Osteoarthritis is a degenerative type of arthritis that occurs most often in people 50 years of age and older, though it may occur in younger people, too.    A hip damaged by osteoarthritis.     In osteoarthritis, the cartilage in the hip joint gradually wears away over time. As the cartilage wears away, it becomes frayed and rough, and the protective joint space between the bones decreases. This can result in bone rubbing on bone. To make up for the lost cartilage, the damaged bones may start to grow outward and form bone spurs (osteophytes).    Osteoarthritis develops slowly and the pain it causes worsens over time.    Watch: Osteoarthritis of the Hip Animation    Cause  Osteoarthritis has no single specific cause, but there are certain factors that may make you more likely to develop the disease, including:  Increasing age  Family history of osteoarthritis  Previous injury to the hip joint  Obesity  Improper formation of the hip joint at birth, a condition known as developmental dysplasia of the  "hip  You can still develop osteoarthritis even if you don't have any of the risk factors listed above.    Symptoms  The most common symptom of hip osteoarthritis is pain. This hip pain develops slowly and worsens over time, although sudden onset is also possible. Pain and stiffness may be worse in the morning, or after sitting or resting for a while. Over time, painful symptoms may occur more frequently, including during rest or at night. Additional symptoms may include:  Pain in your groin or thigh that radiates to your buttocks or your knee  Pain that flares up with vigorous activity  Stiffness in the hip joint that makes it difficult to walk or bend  \"Locking\" or \"sticking\" of the joint, and a grinding noise (crepitus) during movement caused by loose fragments of cartilage and other tissue interfering with the smooth motion of the hip  Decreased range of motion in the hip that affects the ability to walk and may cause a limp  Increased joint pain with rainy weather    Doctor Examination  During your appointment, your doctor will talk with you about your symptoms and medical history, conduct a physical examination, and possibly order diagnostic tests, such as X-rays.    Physical Examination  During the physical examination, your doctor will look for:  Tenderness around the hip  Range of passive (assisted) and active (self-directed) motion  Crepitus (a grating sensation inside the joint) with movement  Pain when pressure is placed on the hip  Problems with your gait (the way you walk)  Any signs of injury to the muscles, tendons, and ligaments surrounding the hip    Imaging Tests  X-rays. X-rays provide detailed pictures of dense structures, such as bones. X-rays of an arthritic hip may show a narrowing of the joint space, changes in the bone, and the formation of bone spurs (osteophytes).    (Left) In this X-ray of a normal hip, the space between the ball and socket indicates healthy cartilage. (Right) This X-ray " of an arthritic hip shows severe loss of joint space.     In this X-ray of an arthritic hip, the arrow indicates a large bone spur (osteophyte) at the bottom of the femoral head.     Other imaging tests. Occasionally, a magnetic resonance imaging (MRI) scan or a computerized tomography (CT) scan may be needed to better determine the condition of the bone and soft tissues of your hip.    Treatment  Although there is no cure for osteoarthritis, there are a number of treatment options that will help relieve pain and improve mobility.      Nonsurgical Treatment  As with other arthritic conditions, early treatment of osteoarthritis of the hip is nonsurgical. Your doctor may recommend a range of nonsurgical treatment options.   Lifestyle modifications. Some changes in your daily life can protect your hip joint and slow the progress of osteoarthritis.  Minimizing activities that aggravate the condition, such as climbing stairs.  Switching from high-impact activities (like jogging or tennis) to lower impact activities (like swimming or cycling) will put less stress on your hip.  Losing weight can reduce stress on the hip joint, resulting in less pain and increased function.  Physical therapy. Specific exercises can help increase range of motion and flexibility, as well as strengthen the muscles in your hip and leg. Your doctor or physical therapist can help develop an individualized exercise program that meets your needs and lifestyle.    Assistive devices. Using walking supports like a cane, crutches, or a walker can improve mobility and independence. Using assistive aids like a long-handled reacher to  low-lying things will help you avoid movements that may cause pain.    Medications. If your pain affects your daily routine, or is not relieved by other nonsurgical methods, your doctor may add medication to your treatment plan.  Acetaminophen (e.g., Tylenol) is an over-the-counter pain reliever that can be effective  in reducing mild arthritis pain. Like all medications, however, over-the-counter pain relievers can cause side effects and interact with other medications you are taking. Be sure to discuss potential side effects with your doctor.  Nonsteroidal anti-inflammatory drugs (NSAIDs) may relieve pain and reduce inflammation. Over-the-counter NSAIDs include naproxen and ibuprofen. Other NSAIDs are available by prescription.  Corticosteroids (also known as cortisone) are powerful anti-inflammatory agents that can be taken by mouth or injected into the painful joint.    Surgical Treatment  Your doctor may recommend surgery if your pain from arthritis causes disability and is not relieved with nonsurgical treatment.    Total hip replacement. Your doctor will remove both the damaged acetabulum and femoral head, and then position new metal, plastic or ceramic joint surfaces to restore the function of your hip.    In total hip replacement, both the head of the femur and the socket are replaced with an artificial device.     Watch: Total Hip Replacement Animation    Hip resurfacing.  In this hip replacement procedure, the damaged bone and cartilage in the acetabulum (hip socket) is removed and replaced with a metal shell. The head of the femur, however, is not removed, but instead capped with a smooth metal covering.    Osteotomy. Either the head of the thighbone or the socket is cut and realigned to take pressure off the hip joint. This procedure is used only rarely to treat osteoarthritis of the hip.    Complications. Although complications are possible with any surgery, your doctor will take steps to minimize the risks. The most common complications of surgery include:  Infection  Excessive bleeding  Blood clots  Hip dislocation  Limb length inequality  Damage to blood vessels or arteries    Recovery  After any type of surgery for osteoarthritis of the hip, there is a period of recovery. Recovery time and rehabilitation depends  on the type of surgery performed.    Your doctor may recommend physical therapy to help you regain strength in your hip and restore range of motion. After your procedure, you may need to use a cane, crutches, or a walker for a time.  In most cases, surgery relieves the pain of osteoarthritis and makes it possible to perform daily activities more easily.    To assist doctors in the management of hip osteoarthritis, the [employer] Academy [employer] has conducted research to provide some useful guidelines. These are recommendations only and may not apply to every case. For more information: Plain Language Summary - Clinical Practice Guideline - Osteoarthritis of the Hip - AAOS

## 2024-09-12 ENCOUNTER — TELEPHONE (OUTPATIENT)
Dept: PAIN MEDICINE | Facility: MEDICAL CENTER | Age: 64
End: 2024-09-12

## 2024-09-13 ENCOUNTER — OFFICE VISIT (OUTPATIENT)
Dept: PHYSICAL THERAPY | Facility: CLINIC | Age: 64
End: 2024-09-13
Payer: COMMERCIAL

## 2024-09-13 DIAGNOSIS — M16.12 ARTHRITIS OF LEFT HIP: Primary | ICD-10-CM

## 2024-09-13 DIAGNOSIS — M51.36 DEGENERATIVE DISC DISEASE, LUMBAR: ICD-10-CM

## 2024-09-13 PROCEDURE — 97112 NEUROMUSCULAR REEDUCATION: CPT | Performed by: PHYSICAL THERAPIST

## 2024-09-13 PROCEDURE — 97110 THERAPEUTIC EXERCISES: CPT | Performed by: PHYSICAL THERAPIST

## 2024-09-13 NOTE — PROGRESS NOTES
"Daily Note     Today's date: 2024  Patient name: Delaney Garza  : 1960  MRN: [id number]  Referring provider: Yuri Galvan PA*  Dx:   Encounter Diagnosis     ICD-10-CM    1. Arthritis of left hip  M16.12       2. Degenerative disc disease, lumbar  M51.36                      Subjective: Patient states decreased left hip pain since receiving injection on .        Objective: See treatment diary below      Assessment: Patient demonstrated improved tolerance to activities this visit; demonstrated moderate fatigue at completion of treatment session.       Plan: Continue per plan of care.      Precautions: PAD, HTN, Hypothyroidism   POC expires Unit limit Auth Expiration date PT/OT/ST + Visit Limit?   24 N/A   BOMN                                           Visit/Unit Tracking  AUTH Status:  Date                18 visits Used 1 2 3 4  5  6                 Remaining  17 16 15 14  13  12                     Manuals                                                            Re-eval          KK, PT                                     Neuro Re-Ed                       Sup TA activation HEP     5\"x20               Sup TA with marches HEP   2x10 ea. 2x10 ea  2x10 ea  2x10 ea.           Sup TA with alt UE/LE                      TB Multifidus press   RTB 20x3\" ea. RTB 20x3\" ea.   GTB 20x3\" ea.  GTB 20x3\" ea.           Supine Pball press with TA activation     20x5\" 20x5\"  20x5\"  20x5\"          Supine clamshell with TA activation     RTB 20x3\" RTB 20x3\"                                       Ther Ex                       Bike    5 min 6 min 5 min  decl.  4 min           L/S ext in stand against counter/table HEP     Unable, completes in sitting 2x30               Seated HS stretch       3x30\" B              LTR       5\"x10 b/l               Mini squats HEP                     Stand hip abd    2x10 ea. 2x10 ea.    2x10 ea.  2x10 ea.         " "  Stand hip ext   2x10 ea. 2x10 ea.      2x10 ea.           Leg press   45# 3x10 45# 5x10    65# 3x10  65# 3x10           Leg press HR     65# 3x10 65# 3x10       TB rows   RTB 2x10 RTB 2x10                 TB lat pull downs   RTB 2x10 RTB 2x10                 Single knee to chest stretch w/strap     Left 10x10\" Towel 10x10\"       Stand SLR flex     Left 2x10                     Ther Activity                                                                       Gait Training                                                                       Modalities                       Mechanical traction   95# 15' static                   MHP       8' seated post                       "

## 2024-09-21 DIAGNOSIS — R13.19 ESOPHAGEAL DYSPHAGIA: ICD-10-CM

## 2024-09-21 DIAGNOSIS — K21.9 GASTROESOPHAGEAL REFLUX DISEASE WITHOUT ESOPHAGITIS: ICD-10-CM

## 2024-09-23 RX ORDER — PANTOPRAZOLE SODIUM 40 MG/1
TABLET, DELAYED RELEASE ORAL
Qty: 90 TABLET | Refills: 1 | Status: SHIPPED | OUTPATIENT
Start: 2024-09-23

## 2024-09-27 ENCOUNTER — APPOINTMENT (OUTPATIENT)
Dept: PHYSICAL THERAPY | Facility: CLINIC | Age: 64
End: 2024-09-27
Payer: COMMERCIAL

## 2024-11-20 NOTE — TELEPHONE ENCOUNTER
----- Message from Desiree Lyn MD sent at 3/25/2022 12:43 PM EDT -----  I left a message going over these results with her  Can you treat her hepatitis C? Detail Level: Detailed Quality 130: Documentation Of Current Medications In The Medical Record: Current Medications Documented

## 2024-12-09 ENCOUNTER — TELEPHONE (OUTPATIENT)
Age: 64
End: 2024-12-09

## 2024-12-09 NOTE — TELEPHONE ENCOUNTER
Rec'd call from patient requesting all information for her upcoming appt. All information provided. No further questions from patient at this time.

## 2025-01-24 DIAGNOSIS — K21.9 GASTROESOPHAGEAL REFLUX DISEASE WITHOUT ESOPHAGITIS: ICD-10-CM

## 2025-01-24 DIAGNOSIS — R13.19 ESOPHAGEAL DYSPHAGIA: ICD-10-CM

## 2025-01-24 RX ORDER — PANTOPRAZOLE SODIUM 40 MG/1
40 TABLET, DELAYED RELEASE ORAL DAILY
Qty: 90 TABLET | Refills: 2 | OUTPATIENT
Start: 2025-01-24

## 2025-06-11 DIAGNOSIS — R13.19 ESOPHAGEAL DYSPHAGIA: ICD-10-CM

## 2025-06-11 DIAGNOSIS — K21.9 GASTROESOPHAGEAL REFLUX DISEASE WITHOUT ESOPHAGITIS: ICD-10-CM

## 2025-06-12 RX ORDER — PANTOPRAZOLE SODIUM 40 MG/1
40 TABLET, DELAYED RELEASE ORAL DAILY
Qty: 90 TABLET | Refills: 2 | Status: SHIPPED | OUTPATIENT
Start: 2025-06-12